# Patient Record
Sex: MALE | Race: WHITE | NOT HISPANIC OR LATINO | Employment: OTHER | URBAN - NONMETROPOLITAN AREA
[De-identification: names, ages, dates, MRNs, and addresses within clinical notes are randomized per-mention and may not be internally consistent; named-entity substitution may affect disease eponyms.]

---

## 2021-03-04 ENCOUNTER — OFFICE VISIT (OUTPATIENT)
Dept: UROLOGY | Facility: CLINIC | Age: 71
End: 2021-03-04

## 2021-03-04 VITALS — WEIGHT: 155 LBS | HEIGHT: 67 IN | TEMPERATURE: 97.8 F | BODY MASS INDEX: 24.33 KG/M2

## 2021-03-04 DIAGNOSIS — N13.1 HYDRONEPHROSIS DUE TO OBSTRUCTION OF URETERAL ORIFICE: ICD-10-CM

## 2021-03-04 DIAGNOSIS — D49.4 BLADDER TUMOR: Primary | ICD-10-CM

## 2021-03-04 PROBLEM — N13.30 HYDRONEPHROSIS: Status: ACTIVE | Noted: 2021-03-04

## 2021-03-04 LAB
BILIRUB BLD-MCNC: NEGATIVE MG/DL
CLARITY, POC: CLEAR
COLOR UR: YELLOW
GLUCOSE UR STRIP-MCNC: NEGATIVE MG/DL
KETONES UR QL: NEGATIVE
LEUKOCYTE EST, POC: NEGATIVE
NITRITE UR-MCNC: NEGATIVE MG/ML
PH UR: 7 [PH] (ref 5–8)
PROT UR STRIP-MCNC: NEGATIVE MG/DL
RBC # UR STRIP: ABNORMAL /UL
SP GR UR: 1.01 (ref 1–1.03)
UROBILINOGEN UR QL: NORMAL

## 2021-03-04 PROCEDURE — 99203 OFFICE O/P NEW LOW 30 MIN: CPT | Performed by: UROLOGY

## 2021-03-04 PROCEDURE — 81003 URINALYSIS AUTO W/O SCOPE: CPT | Performed by: UROLOGY

## 2021-03-04 RX ORDER — METOPROLOL TARTRATE 50 MG/1
50 TABLET, FILM COATED ORAL
COMMUNITY
Start: 2021-02-17

## 2021-03-04 RX ORDER — HYDROCHLOROTHIAZIDE 12.5 MG/1
12.5 TABLET ORAL
COMMUNITY
Start: 2021-02-22

## 2021-03-04 RX ORDER — GENTAMICIN SULFATE 80 MG/100ML
80 INJECTION, SOLUTION INTRAVENOUS ONCE
Status: CANCELLED | OUTPATIENT
Start: 2021-03-17 | End: 2021-03-04

## 2021-03-04 RX ORDER — AMLODIPINE BESYLATE 10 MG/1
10 TABLET ORAL
COMMUNITY
Start: 2021-02-17

## 2021-03-04 NOTE — PROGRESS NOTES
Chief Complaint:          Chief Complaint   Patient presents with   • Benign Prostatic Hypertrophy     New PAtient    • Nephrolithiasis       HPI:   70 y.o. male is referred for evaluation of an enlarged prostate.  He sees Dr. Alec Soto, in [place].  He brought a disc which I personally reviewed showing a large massive right-sided hydroureteronephrosis down to mass with some calcification in the distal ureter most consistent with tumor.  He needs a cystoscopy ureteroscopy he denies exposure to agent orange.  He denies a family history of prostate cancer.  He has had a prior hydrocele by myself many years ago on a repeat by Dr. Alec Soto.  He has gross blood that are vermiform in nature.  He has an atrophic left testicle and a persistent right-sided hydrocele.      Past Medical History:      History reviewed. No pertinent past medical history.      Current Meds:     Current Outpatient Medications   Medication Sig Dispense Refill   • amLODIPine (NORVASC) 10 MG tablet 10 mg.     • hydroCHLOROthiazide (HYDRODIURIL) 12.5 MG tablet 12.5 mg.     • metoprolol tartrate (LOPRESSOR) 50 MG tablet 50 mg.       No current facility-administered medications for this visit.         Allergies:      Allergies   Allergen Reactions   • Penicillins Anaphylaxis        Past Surgical History:     History reviewed. No pertinent surgical history.      Social History:     Social History     Socioeconomic History   • Marital status: Unknown     Spouse name: Not on file   • Number of children: Not on file   • Years of education: Not on file   • Highest education level: Not on file       Family History:     History reviewed. No pertinent family history.    Review of Systems:     Review of Systems   Constitutional: Negative.    HENT: Negative.    Eyes: Negative.    Respiratory: Negative.    Cardiovascular: Negative.    Gastrointestinal: Negative.    Endocrine: Negative.    Genitourinary: Positive for flank pain and hematuria.    Allergic/Immunologic: Negative.    Neurological: Negative.    Hematological: Negative.    Psychiatric/Behavioral: Negative.        Physical Exam:     Physical Exam  Vitals signs and nursing note reviewed.   Constitutional:       Appearance: He is well-developed.   HENT:      Head: Normocephalic and atraumatic.   Eyes:      Conjunctiva/sclera: Conjunctivae normal.      Pupils: Pupils are equal, round, and reactive to light.   Neck:      Musculoskeletal: Normal range of motion.   Cardiovascular:      Rate and Rhythm: Normal rate and regular rhythm.      Heart sounds: Normal heart sounds.   Pulmonary:      Effort: Pulmonary effort is normal.      Breath sounds: Normal breath sounds.   Abdominal:      General: Bowel sounds are normal.      Palpations: Abdomen is soft.   Genitourinary:     Comments: Soft, nontender abdomen atrophic left testicle right-sided firm hydrocele  Musculoskeletal: Normal range of motion.   Skin:     General: Skin is warm and dry.   Neurological:      Mental Status: He is alert and oriented to person, place, and time.      Deep Tendon Reflexes: Reflexes are normal and symmetric.   Psychiatric:         Behavior: Behavior normal.         Thought Content: Thought content normal.         Judgment: Judgment normal.         I have reviewed the following portions of the patient's history: allergies, current medications, past family history, past medical history, past social history, past surgical history, problem list and ROS and confirm it's accurate.      Procedure:       Assessment/Plan:   Right hydroureteronephrosis secondary to a distal solid lesion.  With some calcification.  This is most consistent with carcinoma.  I am recommending an urgent ureteroscopy.  Patient indicated that he may not want to proceed with diagnostic or therapeutic treatment and I told him as long as he is comfortable understanding that this is most likely malignancy.            Patient's Body mass index is 24.28 kg/m².  BMI is within normal parameters. No follow-up required..              This document has been electronically signed by HAROON GONG MD March 4, 2021 09:31 EST

## 2021-03-04 NOTE — H&P (VIEW-ONLY)
Chief Complaint:          Chief Complaint   Patient presents with   • Benign Prostatic Hypertrophy     New PAtient    • Nephrolithiasis       HPI:   70 y.o. male is referred for evaluation of an enlarged prostate.  He sees Dr. Alec Soto, in [place].  He brought a disc which I personally reviewed showing a large massive right-sided hydroureteronephrosis down to mass with some calcification in the distal ureter most consistent with tumor.  He needs a cystoscopy ureteroscopy he denies exposure to agent orange.  He denies a family history of prostate cancer.  He has had a prior hydrocele by myself many years ago on a repeat by Dr. Alec Soto.  He has gross blood that are vermiform in nature.  He has an atrophic left testicle and a persistent right-sided hydrocele.      Past Medical History:      History reviewed. No pertinent past medical history.      Current Meds:     Current Outpatient Medications   Medication Sig Dispense Refill   • amLODIPine (NORVASC) 10 MG tablet 10 mg.     • hydroCHLOROthiazide (HYDRODIURIL) 12.5 MG tablet 12.5 mg.     • metoprolol tartrate (LOPRESSOR) 50 MG tablet 50 mg.       No current facility-administered medications for this visit.         Allergies:      Allergies   Allergen Reactions   • Penicillins Anaphylaxis        Past Surgical History:     History reviewed. No pertinent surgical history.      Social History:     Social History     Socioeconomic History   • Marital status: Unknown     Spouse name: Not on file   • Number of children: Not on file   • Years of education: Not on file   • Highest education level: Not on file       Family History:     History reviewed. No pertinent family history.    Review of Systems:     Review of Systems   Constitutional: Negative.    HENT: Negative.    Eyes: Negative.    Respiratory: Negative.    Cardiovascular: Negative.    Gastrointestinal: Negative.    Endocrine: Negative.    Genitourinary: Positive for flank pain and hematuria.      Allergic/Immunologic: Negative.    Neurological: Negative.    Hematological: Negative.    Psychiatric/Behavioral: Negative.        Physical Exam:     Physical Exam  Vitals signs and nursing note reviewed.   Constitutional:       Appearance: He is well-developed.   HENT:      Head: Normocephalic and atraumatic.   Eyes:      Conjunctiva/sclera: Conjunctivae normal.      Pupils: Pupils are equal, round, and reactive to light.   Neck:      Musculoskeletal: Normal range of motion.   Cardiovascular:      Rate and Rhythm: Normal rate and regular rhythm.      Heart sounds: Normal heart sounds.   Pulmonary:      Effort: Pulmonary effort is normal.      Breath sounds: Normal breath sounds.   Abdominal:      General: Bowel sounds are normal.      Palpations: Abdomen is soft.   Genitourinary:     Comments: Soft, nontender abdomen atrophic left testicle right-sided firm hydrocele  Musculoskeletal: Normal range of motion.   Skin:     General: Skin is warm and dry.   Neurological:      Mental Status: He is alert and oriented to person, place, and time.      Deep Tendon Reflexes: Reflexes are normal and symmetric.   Psychiatric:         Behavior: Behavior normal.         Thought Content: Thought content normal.         Judgment: Judgment normal.         I have reviewed the following portions of the patient's history: allergies, current medications, past family history, past medical history, past social history, past surgical history, problem list and ROS and confirm it's accurate.      Procedure:       Assessment/Plan:   Right hydroureteronephrosis secondary to a distal solid lesion.  With some calcification.  This is most consistent with carcinoma.  I am recommending an urgent ureteroscopy.  Patient indicated that he may not want to proceed with diagnostic or therapeutic treatment and I told him as long as he is comfortable understanding that this is most likely malignancy.            Patient's Body mass index is 24.28 kg/m².  BMI is within normal parameters. No follow-up required..              This document has been electronically signed by HAROON GONG MD March 4, 2021 09:31 EST

## 2021-03-12 NOTE — DISCHARGE INSTRUCTIONS

## 2021-03-15 ENCOUNTER — APPOINTMENT (OUTPATIENT)
Dept: PREADMISSION TESTING | Facility: HOSPITAL | Age: 71
End: 2021-03-15

## 2021-03-15 ENCOUNTER — LAB (OUTPATIENT)
Dept: LAB | Facility: HOSPITAL | Age: 71
End: 2021-03-15

## 2021-03-15 DIAGNOSIS — D49.4 BLADDER TUMOR: ICD-10-CM

## 2021-03-15 LAB
ANION GAP SERPL CALCULATED.3IONS-SCNC: 9.8 MMOL/L (ref 5–15)
BUN SERPL-MCNC: 17 MG/DL (ref 8–23)
BUN/CREAT SERPL: 11 (ref 7–25)
CALCIUM SPEC-SCNC: 9.7 MG/DL (ref 8.6–10.5)
CHLORIDE SERPL-SCNC: 102 MMOL/L (ref 98–107)
CO2 SERPL-SCNC: 28.2 MMOL/L (ref 22–29)
CREAT SERPL-MCNC: 1.54 MG/DL (ref 0.76–1.27)
DEPRECATED RDW RBC AUTO: 40.4 FL (ref 37–54)
ERYTHROCYTE [DISTWIDTH] IN BLOOD BY AUTOMATED COUNT: 12.7 % (ref 12.3–15.4)
GFR SERPL CREATININE-BSD FRML MDRD: 45 ML/MIN/1.73
GLUCOSE SERPL-MCNC: 109 MG/DL (ref 65–99)
HCT VFR BLD AUTO: 44.9 % (ref 37.5–51)
HGB BLD-MCNC: 16.1 G/DL (ref 13–17.7)
MCH RBC QN AUTO: 31.4 PG (ref 26.6–33)
MCHC RBC AUTO-ENTMCNC: 35.9 G/DL (ref 31.5–35.7)
MCV RBC AUTO: 87.5 FL (ref 79–97)
PLATELET # BLD AUTO: 236 10*3/MM3 (ref 140–450)
PMV BLD AUTO: 9.5 FL (ref 6–12)
POTASSIUM SERPL-SCNC: 3.3 MMOL/L (ref 3.5–5.2)
RBC # BLD AUTO: 5.13 10*6/MM3 (ref 4.14–5.8)
SODIUM SERPL-SCNC: 140 MMOL/L (ref 136–145)
WBC # BLD AUTO: 6.03 10*3/MM3 (ref 3.4–10.8)

## 2021-03-15 PROCEDURE — 36415 COLL VENOUS BLD VENIPUNCTURE: CPT

## 2021-03-15 PROCEDURE — 85027 COMPLETE CBC AUTOMATED: CPT

## 2021-03-15 PROCEDURE — C9803 HOPD COVID-19 SPEC COLLECT: HCPCS

## 2021-03-15 PROCEDURE — 80048 BASIC METABOLIC PNL TOTAL CA: CPT

## 2021-03-15 PROCEDURE — 93005 ELECTROCARDIOGRAM TRACING: CPT

## 2021-03-15 PROCEDURE — U0004 COV-19 TEST NON-CDC HGH THRU: HCPCS

## 2021-03-15 PROCEDURE — 93010 ELECTROCARDIOGRAM REPORT: CPT | Performed by: INTERNAL MEDICINE

## 2021-03-16 LAB — SARS-COV-2 RNA NOSE QL NAA+PROBE: NOT DETECTED

## 2021-03-17 ENCOUNTER — APPOINTMENT (OUTPATIENT)
Dept: GENERAL RADIOLOGY | Facility: HOSPITAL | Age: 71
End: 2021-03-17

## 2021-03-17 ENCOUNTER — HOSPITAL ENCOUNTER (OUTPATIENT)
Facility: HOSPITAL | Age: 71
Discharge: HOME | End: 2021-03-17
Attending: UROLOGY | Admitting: UROLOGY

## 2021-03-17 ENCOUNTER — ANESTHESIA (OUTPATIENT)
Dept: PERIOP | Facility: HOSPITAL | Age: 71
End: 2021-03-17

## 2021-03-17 ENCOUNTER — ANESTHESIA EVENT (OUTPATIENT)
Dept: PERIOP | Facility: HOSPITAL | Age: 71
End: 2021-03-17

## 2021-03-17 VITALS
WEIGHT: 158 LBS | RESPIRATION RATE: 16 BRPM | HEIGHT: 67 IN | HEART RATE: 65 BPM | SYSTOLIC BLOOD PRESSURE: 138 MMHG | TEMPERATURE: 98.2 F | BODY MASS INDEX: 24.8 KG/M2 | OXYGEN SATURATION: 97 % | DIASTOLIC BLOOD PRESSURE: 82 MMHG

## 2021-03-17 DIAGNOSIS — N13.1 HYDRONEPHROSIS DUE TO OBSTRUCTION OF URETERAL ORIFICE: ICD-10-CM

## 2021-03-17 PROCEDURE — 25010000002 IOPAMIDOL 61 % SOLUTION: Performed by: UROLOGY

## 2021-03-17 PROCEDURE — 25010000002 IOPAMIDOL 61 % SOLUTION

## 2021-03-17 PROCEDURE — 76000 FLUOROSCOPY <1 HR PHYS/QHP: CPT

## 2021-03-17 PROCEDURE — 52351 CYSTOURETERO & OR PYELOSCOPE: CPT | Performed by: UROLOGY

## 2021-03-17 PROCEDURE — 25010000002 ONDANSETRON PER 1 MG: Performed by: NURSE ANESTHETIST, CERTIFIED REGISTERED

## 2021-03-17 PROCEDURE — 52235 CYSTOSCOPY AND TREATMENT: CPT | Performed by: UROLOGY

## 2021-03-17 PROCEDURE — C1769 GUIDE WIRE: HCPCS | Performed by: UROLOGY

## 2021-03-17 PROCEDURE — 25010000002 GENTAMICIN PER 80 MG: Performed by: UROLOGY

## 2021-03-17 PROCEDURE — 63710000001 OPIUM-BELLADONNA 16.2-30 MG SUPPOSITORY

## 2021-03-17 PROCEDURE — 76000 FLUOROSCOPY <1 HR PHYS/QHP: CPT | Performed by: RADIOLOGY

## 2021-03-17 PROCEDURE — 25010000002 MIDAZOLAM PER 1 MG: Performed by: NURSE ANESTHETIST, CERTIFIED REGISTERED

## 2021-03-17 PROCEDURE — 25010000002 DEXAMETHASONE PER 1 MG: Performed by: NURSE ANESTHETIST, CERTIFIED REGISTERED

## 2021-03-17 PROCEDURE — C1758 CATHETER, URETERAL: HCPCS | Performed by: UROLOGY

## 2021-03-17 PROCEDURE — 63710000001 OPIUM-BELLADONNA 16.2-30 MG SUPPOSITORY: Performed by: UROLOGY

## 2021-03-17 PROCEDURE — A9270 NON-COVERED ITEM OR SERVICE: HCPCS | Performed by: UROLOGY

## 2021-03-17 PROCEDURE — 25010000002 PROPOFOL 10 MG/ML EMULSION: Performed by: NURSE ANESTHETIST, CERTIFIED REGISTERED

## 2021-03-17 PROCEDURE — 25010000002 FENTANYL CITRATE (PF) 100 MCG/2ML SOLUTION: Performed by: NURSE ANESTHETIST, CERTIFIED REGISTERED

## 2021-03-17 PROCEDURE — A9270 NON-COVERED ITEM OR SERVICE: HCPCS

## 2021-03-17 RX ORDER — MIDAZOLAM HYDROCHLORIDE 1 MG/ML
1 INJECTION INTRAMUSCULAR; INTRAVENOUS
Status: DISCONTINUED | OUTPATIENT
Start: 2021-03-17 | End: 2021-03-17 | Stop reason: HOSPADM

## 2021-03-17 RX ORDER — KETOROLAC TROMETHAMINE 30 MG/ML
15 INJECTION, SOLUTION INTRAMUSCULAR; INTRAVENOUS EVERY 6 HOURS PRN
Status: DISCONTINUED | OUTPATIENT
Start: 2021-03-17 | End: 2021-03-17 | Stop reason: HOSPADM

## 2021-03-17 RX ORDER — OXYCODONE HYDROCHLORIDE AND ACETAMINOPHEN 5; 325 MG/1; MG/1
1 TABLET ORAL ONCE AS NEEDED
Status: DISCONTINUED | OUTPATIENT
Start: 2021-03-17 | End: 2021-03-17 | Stop reason: HOSPADM

## 2021-03-17 RX ORDER — FENTANYL CITRATE 50 UG/ML
INJECTION, SOLUTION INTRAMUSCULAR; INTRAVENOUS AS NEEDED
Status: DISCONTINUED | OUTPATIENT
Start: 2021-03-17 | End: 2021-03-17 | Stop reason: SURG

## 2021-03-17 RX ORDER — SODIUM CHLORIDE, SODIUM LACTATE, POTASSIUM CHLORIDE, CALCIUM CHLORIDE 600; 310; 30; 20 MG/100ML; MG/100ML; MG/100ML; MG/100ML
125 INJECTION, SOLUTION INTRAVENOUS ONCE
Status: COMPLETED | OUTPATIENT
Start: 2021-03-17 | End: 2021-03-17

## 2021-03-17 RX ORDER — SODIUM CHLORIDE 0.9 % (FLUSH) 0.9 %
10 SYRINGE (ML) INJECTION AS NEEDED
Status: DISCONTINUED | OUTPATIENT
Start: 2021-03-17 | End: 2021-03-17 | Stop reason: HOSPADM

## 2021-03-17 RX ORDER — MAGNESIUM HYDROXIDE 1200 MG/15ML
LIQUID ORAL AS NEEDED
Status: DISCONTINUED | OUTPATIENT
Start: 2021-03-17 | End: 2021-03-17 | Stop reason: HOSPADM

## 2021-03-17 RX ORDER — FENTANYL CITRATE 50 UG/ML
50 INJECTION, SOLUTION INTRAMUSCULAR; INTRAVENOUS
Status: DISCONTINUED | OUTPATIENT
Start: 2021-03-17 | End: 2021-03-17 | Stop reason: HOSPADM

## 2021-03-17 RX ORDER — MIDAZOLAM HYDROCHLORIDE 1 MG/ML
0.5 INJECTION INTRAMUSCULAR; INTRAVENOUS
Status: DISCONTINUED | OUTPATIENT
Start: 2021-03-17 | End: 2021-03-17 | Stop reason: HOSPADM

## 2021-03-17 RX ORDER — MIDAZOLAM HYDROCHLORIDE 1 MG/ML
INJECTION INTRAMUSCULAR; INTRAVENOUS AS NEEDED
Status: DISCONTINUED | OUTPATIENT
Start: 2021-03-17 | End: 2021-03-17 | Stop reason: SURG

## 2021-03-17 RX ORDER — SODIUM CHLORIDE 0.9 % (FLUSH) 0.9 %
10 SYRINGE (ML) INJECTION EVERY 12 HOURS SCHEDULED
Status: DISCONTINUED | OUTPATIENT
Start: 2021-03-17 | End: 2021-03-17 | Stop reason: HOSPADM

## 2021-03-17 RX ORDER — IPRATROPIUM BROMIDE AND ALBUTEROL SULFATE 2.5; .5 MG/3ML; MG/3ML
3 SOLUTION RESPIRATORY (INHALATION) ONCE AS NEEDED
Status: DISCONTINUED | OUTPATIENT
Start: 2021-03-17 | End: 2021-03-17 | Stop reason: HOSPADM

## 2021-03-17 RX ORDER — ATROPA BELLADONNA AND OPIUM 16.2; 3 MG/1; MG/1
SUPPOSITORY RECTAL AS NEEDED
Status: DISCONTINUED | OUTPATIENT
Start: 2021-03-17 | End: 2021-03-17 | Stop reason: HOSPADM

## 2021-03-17 RX ORDER — DROPERIDOL 2.5 MG/ML
0.62 INJECTION, SOLUTION INTRAMUSCULAR; INTRAVENOUS ONCE AS NEEDED
Status: DISCONTINUED | OUTPATIENT
Start: 2021-03-17 | End: 2021-03-17 | Stop reason: HOSPADM

## 2021-03-17 RX ORDER — ONDANSETRON 2 MG/ML
4 INJECTION INTRAMUSCULAR; INTRAVENOUS AS NEEDED
Status: DISCONTINUED | OUTPATIENT
Start: 2021-03-17 | End: 2021-03-17 | Stop reason: HOSPADM

## 2021-03-17 RX ORDER — PROPOFOL 10 MG/ML
VIAL (ML) INTRAVENOUS AS NEEDED
Status: DISCONTINUED | OUTPATIENT
Start: 2021-03-17 | End: 2021-03-17 | Stop reason: SURG

## 2021-03-17 RX ORDER — HYDROCODONE BITARTRATE AND ACETAMINOPHEN 10; 325 MG/1; MG/1
1 TABLET ORAL EVERY 4 HOURS PRN
Qty: 12 TABLET | Refills: 0 | Status: SHIPPED | OUTPATIENT
Start: 2021-03-17 | End: 2022-08-15

## 2021-03-17 RX ORDER — ONDANSETRON 2 MG/ML
INJECTION INTRAMUSCULAR; INTRAVENOUS AS NEEDED
Status: DISCONTINUED | OUTPATIENT
Start: 2021-03-17 | End: 2021-03-17 | Stop reason: SURG

## 2021-03-17 RX ORDER — DEXAMETHASONE SODIUM PHOSPHATE 10 MG/ML
INJECTION INTRAMUSCULAR; INTRAVENOUS AS NEEDED
Status: DISCONTINUED | OUTPATIENT
Start: 2021-03-17 | End: 2021-03-17 | Stop reason: SURG

## 2021-03-17 RX ORDER — LIDOCAINE HYDROCHLORIDE 20 MG/ML
INJECTION, SOLUTION INFILTRATION; PERINEURAL AS NEEDED
Status: DISCONTINUED | OUTPATIENT
Start: 2021-03-17 | End: 2021-03-17 | Stop reason: SURG

## 2021-03-17 RX ORDER — SODIUM CHLORIDE, SODIUM LACTATE, POTASSIUM CHLORIDE, CALCIUM CHLORIDE 600; 310; 30; 20 MG/100ML; MG/100ML; MG/100ML; MG/100ML
100 INJECTION, SOLUTION INTRAVENOUS ONCE AS NEEDED
Status: DISCONTINUED | OUTPATIENT
Start: 2021-03-17 | End: 2021-03-17 | Stop reason: HOSPADM

## 2021-03-17 RX ORDER — GENTAMICIN SULFATE 80 MG/100ML
80 INJECTION, SOLUTION INTRAVENOUS ONCE
Status: COMPLETED | OUTPATIENT
Start: 2021-03-17 | End: 2021-03-17

## 2021-03-17 RX ADMIN — GENTAMICIN SULFATE 80 MG: 80 INJECTION, SOLUTION INTRAVENOUS at 09:51

## 2021-03-17 RX ADMIN — SODIUM CHLORIDE, POTASSIUM CHLORIDE, SODIUM LACTATE AND CALCIUM CHLORIDE 125 ML/HR: 600; 310; 30; 20 INJECTION, SOLUTION INTRAVENOUS at 09:12

## 2021-03-17 RX ADMIN — FENTANYL CITRATE 25 MCG: 50 INJECTION INTRAMUSCULAR; INTRAVENOUS at 10:23

## 2021-03-17 RX ADMIN — MIDAZOLAM 2 MG: 1 INJECTION INTRAMUSCULAR; INTRAVENOUS at 09:51

## 2021-03-17 RX ADMIN — ONDANSETRON 4 MG: 2 INJECTION INTRAMUSCULAR; INTRAVENOUS at 10:04

## 2021-03-17 RX ADMIN — LIDOCAINE HYDROCHLORIDE 100 MG: 20 INJECTION, SOLUTION INFILTRATION; PERINEURAL at 09:56

## 2021-03-17 RX ADMIN — DEXAMETHASONE SODIUM PHOSPHATE 4 MG: 10 INJECTION INTRAMUSCULAR; INTRAVENOUS at 10:04

## 2021-03-17 RX ADMIN — FENTANYL CITRATE 50 MCG: 50 INJECTION INTRAMUSCULAR; INTRAVENOUS at 10:01

## 2021-03-17 RX ADMIN — FENTANYL CITRATE 50 MCG: 50 INJECTION INTRAMUSCULAR; INTRAVENOUS at 10:08

## 2021-03-17 RX ADMIN — SODIUM CHLORIDE, POTASSIUM CHLORIDE, SODIUM LACTATE AND CALCIUM CHLORIDE: 600; 310; 30; 20 INJECTION, SOLUTION INTRAVENOUS at 09:51

## 2021-03-17 RX ADMIN — PROPOFOL 200 MG: 10 INJECTION, EMULSION INTRAVENOUS at 09:56

## 2021-03-17 RX ADMIN — FENTANYL CITRATE 25 MCG: 50 INJECTION INTRAMUSCULAR; INTRAVENOUS at 10:26

## 2021-03-17 NOTE — ANESTHESIA POSTPROCEDURE EVALUATION
Patient: Alycia De La Rosa    Procedure Summary     Date: 03/17/21 Room / Location: [facility] OR 06 /  [facility] OR    Anesthesia Start: 0951 Anesthesia Stop: 1030    Procedures:       CYSTOSCOPY TRANSURETHRAL RESECTION OF BLADDER TUMOR (Bilateral )      CYSTOSCOPY RETROGRADE PYELOGRAM (Right ) Diagnosis:       Hydronephrosis due to obstruction of ureteral orifice      (Hydronephrosis due to obstruction of ureteral orifice [N13.2])    Surgeons: Dennis Link MD Provider: Pastor Myers MD    Anesthesia Type: general ASA Status: 2          Anesthesia Type: general    Vitals  Vitals Value Taken Time   /75 03/17/21 1042   Temp 98.2 °F (36.8 °C) 03/17/21 1032   Pulse 53 03/17/21 1042   Resp 10 03/17/21 1042   SpO2 96 % 03/17/21 1042           Post Anesthesia Care and Evaluation    Patient location during evaluation: PHASE II  Patient participation: complete - patient participated  Level of consciousness: awake and alert  Pain score: 0  Pain management: adequate  Airway patency: patent  Anesthetic complications: No anesthetic complications    Cardiovascular status: acceptable  Respiratory status: acceptable  Hydration status: acceptable

## 2021-03-17 NOTE — ANESTHESIA PROCEDURE NOTES
Airway  Urgency: elective    Date/Time: 3/17/2021 9:56 AM  Airway not difficult    General Information and Staff    Patient location during procedure: OR  CRNA: Emma To CRNA    Indications and Patient Condition  Indications for airway management: airway protection    Preoxygenated: yes  MILS maintained throughout  Mask difficulty assessment: 0 - not attempted    Final Airway Details  Final airway type: supraglottic airway      Successful airway: classic  Size 4    Number of attempts at approach: 1  Assessment: lips, teeth, and gum same as pre-op

## 2021-03-17 NOTE — ANESTHESIA PREPROCEDURE EVALUATION
Anesthesia Evaluation     history of anesthetic complications: PONV  NPO Solid Status: > 8 hours  NPO Liquid Status: > 8 hours           Airway   Mallampati: II  TM distance: >3 FB  Neck ROM: full  No difficulty expected  Dental    (+) edentulous    Pulmonary - normal exam   Cardiovascular - normal exam    (+) hypertension,       Neuro/Psych  (+) TIA,     GI/Hepatic/Renal/Endo    (+)   renal disease stones,     Musculoskeletal     Abdominal  - normal exam   Substance History      OB/GYN          Other                      Anesthesia Plan    ASA 2     general     intravenous induction     Anesthetic plan, all risks, benefits, and alternatives have been provided, discussed and informed consent has been obtained with: patient.

## 2021-03-17 NOTE — OP NOTE
CYSTOSCOPY URETEROSCOPY, CYSTOSCOPY RETROGRADE PYELOGRAM  Procedure Note    Alycia De La Rosa  3/17/2021    Pre-op Diagnosis:   Hydronephrosis due to obstruction of ureteral orifice [N13.2]    Post-op Diagnosis:     Post-Op Diagnosis Codes:     * Hydronephrosis due to obstruction of ureteral orifice [N13.2]    Procedure/CPT® Codes:  70-year-old white male presents with a right nonfunctional kidney gross hematuria and some calcification at the area of the distal ureter.  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  Following an informed consent brought the operative suite careful cystoscopy was undertaken normal urethra normal prostate indented right ureteral orifice and an obvious tumor behind it.  With extensive neovascularity attempted retrograde impacted the needle orifice but it was completely obstructed TUR the area until obvious ureter and a large amount of tumor extruded sent off for pathologic confirmation hemostasis was excellent bladder was catheterized bimanual was negative    Procedure(s):  CYSTOSCOPY TRANSURETHRAL RESECTION OF BLADDER TUMOR-resected 2.5 cm  CYSTOSCOPY RETROGRADE PYELOGRAM    Surgeon(s):  Dennis Link MD    Anesthesia: see anesthesia record    Staff:   Circulator: Alaina Osman RN  Scrub Person: Indiana Oliveira LPN  Other: Maggie Chapin    Estimated Blood Loss: none  Urine Voided: * No values recorded between 3/17/2021  9:51 AM and 3/17/2021 10:31 AM *    Specimens:                ID Type Source Tests Collected by Time   A : Bladder/Right Ureteral Tumor Tissue Urinary Bladder TISSUE PATHOLOGY EXAM Dennis Link MD 3/17/2021 1009         Drains: Mccall catheter    Findings: 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

## 2021-03-18 LAB
LAB AP CASE REPORT: NORMAL
QT INTERVAL: 402 MS
QTC INTERVAL: 454 MS

## 2021-03-23 ENCOUNTER — OFFICE VISIT (OUTPATIENT)
Dept: UROLOGY | Facility: CLINIC | Age: 71
End: 2021-03-23

## 2021-03-23 VITALS — WEIGHT: 157.41 LBS | BODY MASS INDEX: 24.71 KG/M2 | TEMPERATURE: 98.2 F | HEIGHT: 67 IN

## 2021-03-23 DIAGNOSIS — C66.1 URETERAL CARCINOMA, RIGHT (HCC): Primary | ICD-10-CM

## 2021-03-23 PROCEDURE — 99213 OFFICE O/P EST LOW 20 MIN: CPT | Performed by: UROLOGY

## 2021-03-23 NOTE — PROGRESS NOTES
Chief Complaint:          Chief Complaint   Patient presents with   • Post-op       HPI:   70 y.o. male returns today accompanied by his daughter who is a nurse at nephrology.  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  But he is really 70 and quite healthy he has no constitutional symptoms I will order staging work-up and I will follow up with him based on this      Past Medical History:        Past Medical History:   Diagnosis Date   • Arthritis    • Asthma    • GERD (gastroesophageal reflux disease)    • Hydronephrosis 3/4/2021   • Hypertension    • Kidney stone    • TIA (transient ischemic attack)     AGE 29         Current Meds:     Current Outpatient Medications   Medication Sig Dispense Refill   • amLODIPine (NORVASC) 10 MG tablet 10 mg.     • hydroCHLOROthiazide (HYDRODIURIL) 12.5 MG tablet 12.5 mg.     • HYDROcodone-acetaminophen (NORCO)  MG per tablet Take 1 tablet by mouth Every 4 (Four) Hours As Needed for Moderate Pain 12 tablet 0   • metoprolol tartrate (LOPRESSOR) 50 MG tablet 50 mg.       No current facility-administered medications for this visit.        Allergies:      Allergies   Allergen Reactions   • Lisinopril Anaphylaxis   • Penicillins Anaphylaxis        Past Surgical History:     Past Surgical History:   Procedure Laterality Date   • CYSTOSCOPY RETROGRADE PYELOGRAM Right 3/17/2021    Procedure: CYSTOSCOPY RETROGRADE PYELOGRAM;  Surgeon: Dennis Link MD;  Location: [facility];  Service: Urology;  Laterality: Right;   • CYSTOSCOPY URETEROSCOPY  Bilateral 3/17/2021    Procedure: CYSTOSCOPY TRANSURETHRAL RESECTION OF BLADDER TUMOR;  Surgeon: Dennis Link MD;  Location: [facility];  Service: Urology;  Laterality: Bilateral;   • HYDROCELE EXCISION / REPAIR           Social History:     Social History     Socioeconomic History   • Marital status: Unknown     Spouse name: Not on file   • Number of children: Not on file   • Years of education: Not on file   • Highest education level: Not on file   Tobacco Use   • Smoking status: Former Smoker     Packs/day: 3.00     Years: 5.00     Pack years: 15.00     Types: Cigarettes     Quit date:      Years since quittin.2   • Smokeless tobacco: Former User   Vaping Use   • Vaping Use: Never used   Substance and Sexual Activity   • Alcohol use: Not Currently   • Drug use: Never   • Sexual activity: Defer       Family History:     Family History   Problem Relation Age of Onset   • No Known Problems Father    • No Known Problems Mother        Review of Systems:     Review of Systems   Constitutional: Negative.    HENT: Negative.    Eyes: Negative.    Respiratory: Negative.    Cardiovascular: Negative.    Gastrointestinal: Negative.    Endocrine: Negative.    Musculoskeletal: Negative.    Allergic/Immunologic: Negative.    Neurological: Negative.    Hematological: Negative.    Psychiatric/Behavioral: Negative.        Physical Exam:     Physical Exam  Vitals and nursing note reviewed.   Constitutional:       Appearance: He is well-developed.   HENT:      Head: Normocephalic and atraumatic.   Eyes:      Conjunctiva/sclera: Conjunctivae normal.      Pupils: Pupils are equal, round, and reactive to light.   Cardiovascular:      Rate and Rhythm: Normal rate and regular rhythm.      Heart sounds: Normal heart sounds.   Pulmonary:      Effort: Pulmonary effort is normal.      Breath sounds: Normal breath sounds.   Abdominal:      General: Bowel sounds are normal.      Palpations: Abdomen is soft.   Musculoskeletal:          General: Normal range of motion.      Cervical back: Normal range of motion.   Skin:     General: Skin is warm and dry.   Neurological:      Mental Status: He is alert and oriented to person, place, and time.      Deep Tendon Reflexes: Reflexes are normal and symmetric.   Psychiatric:         Behavior: Behavior normal.         Thought Content: Thought content normal.         Judgment: Judgment normal.         I have reviewed the following portions of the patient's history: allergies, current medications, past family history, past medical history, past social history, past surgical history, problem list and ROS and confirm it's accurate.      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 Nik Sylvester.            Patient's Body mass index is 24.71 kg/m². BMI is within normal parameters. No follow-up required..              This document has been electronically signed by HAROON GONG MD March 23, 2021 07:59 EDT

## 2021-03-25 PROBLEM — C66.1: Status: ACTIVE | Noted: 2021-03-25

## 2021-04-12 ENCOUNTER — HOSPITAL ENCOUNTER (OUTPATIENT)
Dept: CT IMAGING | Facility: HOSPITAL | Age: 71
Discharge: HOME | End: 2021-04-12
Admitting: UROLOGY

## 2021-04-12 ENCOUNTER — APPOINTMENT (OUTPATIENT)
Dept: CT IMAGING | Facility: HOSPITAL | Age: 71
End: 2021-04-12

## 2021-04-12 DIAGNOSIS — C66.1 URETERAL CARCINOMA, RIGHT (HCC): ICD-10-CM

## 2021-04-12 PROCEDURE — 25010000002 IOPAMIDOL 61 % SOLUTION: Performed by: UROLOGY

## 2021-04-12 PROCEDURE — 71260 CT THORAX DX C+: CPT | Performed by: RADIOLOGY

## 2021-04-12 PROCEDURE — 71260 CT THORAX DX C+: CPT

## 2021-04-12 RX ADMIN — IOPAMIDOL 80 ML: 612 INJECTION, SOLUTION INTRAVENOUS at 14:11

## 2021-04-22 ENCOUNTER — HOSPITAL ENCOUNTER (OUTPATIENT)
Dept: CT IMAGING | Facility: HOSPITAL | Age: 71
Discharge: HOME | End: 2021-04-22
Admitting: UROLOGY

## 2021-04-22 DIAGNOSIS — C66.1 URETERAL CARCINOMA, RIGHT (HCC): ICD-10-CM

## 2021-04-22 LAB — CREAT BLDA-MCNC: 1.7 MG/DL (ref 0.6–1.3)

## 2021-04-22 PROCEDURE — 74176 CT ABD & PELVIS W/O CONTRAST: CPT

## 2021-04-22 PROCEDURE — 82565 ASSAY OF CREATININE: CPT

## 2021-04-22 PROCEDURE — 74176 CT ABD & PELVIS W/O CONTRAST: CPT | Performed by: RADIOLOGY

## 2021-05-12 ENCOUNTER — TRANSCRIBE ORDERS (OUTPATIENT)
Dept: ADMINISTRATIVE | Facility: HOSPITAL | Age: 71
End: 2021-05-12

## 2021-05-12 DIAGNOSIS — C68.8 MALIGNANT NEOPLASM OF OVERLAPPING SITES OF URINARY ORGANS (HCC): Primary | ICD-10-CM

## 2021-05-12 DIAGNOSIS — C67.9 MALIGNANT NEOPLASM OF URINARY BLADDER, UNSPECIFIED SITE (HCC): ICD-10-CM

## 2021-05-19 ENCOUNTER — HOSPITAL ENCOUNTER (OUTPATIENT)
Dept: NUCLEAR MEDICINE | Facility: HOSPITAL | Age: 71
Discharge: HOME | End: 2021-05-19

## 2021-05-19 DIAGNOSIS — C68.8 MALIGNANT NEOPLASM OF OVERLAPPING SITES OF URINARY ORGANS (HCC): ICD-10-CM

## 2021-05-19 DIAGNOSIS — C67.9 MALIGNANT NEOPLASM OF URINARY BLADDER, UNSPECIFIED SITE (HCC): ICD-10-CM

## 2021-05-19 PROCEDURE — 25010000002 FUROSEMIDE PER 20 MG: Performed by: UROLOGY

## 2021-05-19 PROCEDURE — 78708 K FLOW/FUNCT IMAGE W/DRUG: CPT

## 2021-05-19 PROCEDURE — 0 TECHNETIUM MERTIATIDE: Performed by: UROLOGY

## 2021-05-19 PROCEDURE — 78708 K FLOW/FUNCT IMAGE W/DRUG: CPT | Performed by: RADIOLOGY

## 2021-05-19 PROCEDURE — A9562 TC99M MERTIATIDE: HCPCS | Performed by: UROLOGY

## 2021-05-19 RX ORDER — FUROSEMIDE 10 MG/ML
20 INJECTION INTRAMUSCULAR; INTRAVENOUS
Status: COMPLETED | OUTPATIENT
Start: 2021-05-19 | End: 2021-05-19

## 2021-05-19 RX ADMIN — TECHNESCAN TC 99M MERTIATIDE 1 DOSE: 1 INJECTION, POWDER, LYOPHILIZED, FOR SOLUTION INTRAVENOUS at 08:49

## 2021-05-19 RX ADMIN — FUROSEMIDE 20 MG: 10 INJECTION, SOLUTION INTRAVENOUS at 08:59

## 2021-06-04 ENCOUNTER — TRANSCRIBE ORDERS (OUTPATIENT)
Dept: ADMINISTRATIVE | Facility: HOSPITAL | Age: 71
End: 2021-06-04

## 2021-06-04 DIAGNOSIS — Z01.818 OTHER SPECIFIED PRE-OPERATIVE EXAMINATION: Primary | ICD-10-CM

## 2021-06-09 ENCOUNTER — LAB (OUTPATIENT)
Dept: LAB | Facility: HOSPITAL | Age: 71
End: 2021-06-09

## 2021-06-09 DIAGNOSIS — Z01.818 OTHER SPECIFIED PRE-OPERATIVE EXAMINATION: ICD-10-CM

## 2021-06-09 LAB — SARS-COV-2 RNA RESP QL NAA+PROBE: NOT DETECTED

## 2021-06-09 PROCEDURE — U0003 INFECTIOUS AGENT DETECTION BY NUCLEIC ACID (DNA OR RNA); SEVERE ACUTE RESPIRATORY SYNDROME CORONAVIRUS 2 (SARS-COV-2) (CORONAVIRUS DISEASE [COVID-19]), AMPLIFIED PROBE TECHNIQUE, MAKING USE OF HIGH THROUGHPUT TECHNOLOGIES AS DESCRIBED BY CMS-2020-01-R: HCPCS

## 2021-06-09 PROCEDURE — C9803 HOPD COVID-19 SPEC COLLECT: HCPCS

## 2021-07-02 ENCOUNTER — TRANSCRIBE ORDERS (OUTPATIENT)
Dept: ADMINISTRATIVE | Facility: HOSPITAL | Age: 71
End: 2021-07-02

## 2021-07-02 DIAGNOSIS — C68.9 UROTHELIAL CARCINOMA (HCC): Primary | ICD-10-CM

## 2021-07-09 ENCOUNTER — APPOINTMENT (OUTPATIENT)
Dept: CT IMAGING | Facility: HOSPITAL | Age: 71
End: 2021-07-09

## 2021-07-16 ENCOUNTER — TELEPHONE (OUTPATIENT)
Dept: ONCOLOGY | Facility: CLINIC | Age: 71
End: 2021-07-16

## 2021-07-16 NOTE — TELEPHONE ENCOUNTER
Provider: DR KAPOOR  Caller: ZHANNA  Relationship to Patient: DAUGHTER  Phone Number: [phone]  Reason for Call: PATIENT'S DAUGHTER WOULD LIKE HIM TO SEE DR FAIRBANKS INSTEAD OF DR KAPOOR BECAUSE SHE WAS RECOMMENDED. THEY'D ALSO LIKE THE EARLIEST APPT POSSIBLE. PLEASE CALL BACK TO DISCUSS.

## 2021-07-19 ENCOUNTER — CONSULT (OUTPATIENT)
Dept: ONCOLOGY | Facility: CLINIC | Age: 71
End: 2021-07-19

## 2021-07-19 VITALS
OXYGEN SATURATION: 99 % | RESPIRATION RATE: 18 BRPM | TEMPERATURE: 97.8 F | BODY MASS INDEX: 24.83 KG/M2 | HEIGHT: 67 IN | DIASTOLIC BLOOD PRESSURE: 81 MMHG | HEART RATE: 73 BPM | SYSTOLIC BLOOD PRESSURE: 137 MMHG | WEIGHT: 158.2 LBS

## 2021-07-19 DIAGNOSIS — N13.9 OBSTRUCTIVE UROPATHY: ICD-10-CM

## 2021-07-19 DIAGNOSIS — C67.9 SMALL CELL CARCINOMA OF BLADDER (HCC): ICD-10-CM

## 2021-07-19 DIAGNOSIS — R53.83 FATIGUE, UNSPECIFIED TYPE: ICD-10-CM

## 2021-07-19 DIAGNOSIS — C66.1 URETERAL CARCINOMA, RIGHT (HCC): Primary | ICD-10-CM

## 2021-07-19 DIAGNOSIS — G89.3 NEOPLASM RELATED PAIN: ICD-10-CM

## 2021-07-19 DIAGNOSIS — R31.9 HEMATURIA, UNSPECIFIED TYPE: ICD-10-CM

## 2021-07-19 LAB
ALBUMIN SERPL-MCNC: 4.7 G/DL (ref 3.5–5.2)
ALBUMIN/GLOB SERPL: 1.4 G/DL
ALP SERPL-CCNC: 63 U/L (ref 39–117)
ALT SERPL W P-5'-P-CCNC: 14 U/L (ref 1–41)
ANION GAP SERPL CALCULATED.3IONS-SCNC: 12.2 MMOL/L (ref 5–15)
AST SERPL-CCNC: 24 U/L (ref 1–40)
BASOPHILS # BLD AUTO: 0.09 10*3/MM3 (ref 0–0.2)
BASOPHILS NFR BLD AUTO: 1.2 % (ref 0–1.5)
BILIRUB SERPL-MCNC: 0.5 MG/DL (ref 0–1.2)
BUN SERPL-MCNC: 15 MG/DL (ref 8–23)
BUN/CREAT SERPL: 9.4 (ref 7–25)
CALCIUM SPEC-SCNC: 9.8 MG/DL (ref 8.6–10.5)
CHLORIDE SERPL-SCNC: 99 MMOL/L (ref 98–107)
CO2 SERPL-SCNC: 25.8 MMOL/L (ref 22–29)
CREAT SERPL-MCNC: 1.59 MG/DL (ref 0.76–1.27)
DEPRECATED RDW RBC AUTO: 38.5 FL (ref 37–54)
EOSINOPHIL # BLD AUTO: 0.39 10*3/MM3 (ref 0–0.4)
EOSINOPHIL NFR BLD AUTO: 5.2 % (ref 0.3–6.2)
ERYTHROCYTE [DISTWIDTH] IN BLOOD BY AUTOMATED COUNT: 12.3 % (ref 12.3–15.4)
FERRITIN SERPL-MCNC: 823.4 NG/ML (ref 30–400)
GFR SERPL CREATININE-BSD FRML MDRD: 43 ML/MIN/1.73
GLOBULIN UR ELPH-MCNC: 3.3 GM/DL
GLUCOSE SERPL-MCNC: 122 MG/DL (ref 65–99)
HCT VFR BLD AUTO: 45.6 % (ref 37.5–51)
HGB BLD-MCNC: 16.4 G/DL (ref 13–17.7)
IMM GRANULOCYTES # BLD AUTO: 0.01 10*3/MM3 (ref 0–0.05)
IMM GRANULOCYTES NFR BLD AUTO: 0.1 % (ref 0–0.5)
IRON 24H UR-MRATE: 79 MCG/DL (ref 59–158)
IRON SATN MFR SERPL: 26 % (ref 20–50)
LDH SERPL-CCNC: 371 U/L (ref 135–225)
LYMPHOCYTES # BLD AUTO: 1.79 10*3/MM3 (ref 0.7–3.1)
LYMPHOCYTES NFR BLD AUTO: 23.9 % (ref 19.6–45.3)
MCH RBC QN AUTO: 30.9 PG (ref 26.6–33)
MCHC RBC AUTO-ENTMCNC: 36 G/DL (ref 31.5–35.7)
MCV RBC AUTO: 86 FL (ref 79–97)
MONOCYTES # BLD AUTO: 0.61 10*3/MM3 (ref 0.1–0.9)
MONOCYTES NFR BLD AUTO: 8.2 % (ref 5–12)
NEUTROPHILS NFR BLD AUTO: 4.59 10*3/MM3 (ref 1.7–7)
NEUTROPHILS NFR BLD AUTO: 61.4 % (ref 42.7–76)
NRBC BLD AUTO-RTO: 0 /100 WBC (ref 0–0.2)
PLATELET # BLD AUTO: 260 10*3/MM3 (ref 140–450)
PMV BLD AUTO: 9.3 FL (ref 6–12)
POTASSIUM SERPL-SCNC: 3.5 MMOL/L (ref 3.5–5.2)
PROT SERPL-MCNC: 8 G/DL (ref 6–8.5)
RBC # BLD AUTO: 5.3 10*6/MM3 (ref 4.14–5.8)
SODIUM SERPL-SCNC: 137 MMOL/L (ref 136–145)
TIBC SERPL-MCNC: 305 MCG/DL (ref 298–536)
TRANSFERRIN SERPL-MCNC: 205 MG/DL (ref 200–360)
URATE SERPL-MCNC: 8 MG/DL (ref 3.4–7)
WBC # BLD AUTO: 7.48 10*3/MM3 (ref 3.4–10.8)

## 2021-07-19 PROCEDURE — 85025 COMPLETE CBC W/AUTO DIFF WBC: CPT | Performed by: INTERNAL MEDICINE

## 2021-07-19 PROCEDURE — 82607 VITAMIN B-12: CPT | Performed by: INTERNAL MEDICINE

## 2021-07-19 PROCEDURE — 83540 ASSAY OF IRON: CPT | Performed by: INTERNAL MEDICINE

## 2021-07-19 PROCEDURE — 83615 LACTATE (LD) (LDH) ENZYME: CPT | Performed by: INTERNAL MEDICINE

## 2021-07-19 PROCEDURE — 82746 ASSAY OF FOLIC ACID SERUM: CPT | Performed by: INTERNAL MEDICINE

## 2021-07-19 PROCEDURE — 84466 ASSAY OF TRANSFERRIN: CPT | Performed by: INTERNAL MEDICINE

## 2021-07-19 PROCEDURE — 82728 ASSAY OF FERRITIN: CPT | Performed by: INTERNAL MEDICINE

## 2021-07-19 PROCEDURE — 99205 OFFICE O/P NEW HI 60 MIN: CPT | Performed by: INTERNAL MEDICINE

## 2021-07-19 PROCEDURE — 84550 ASSAY OF BLOOD/URIC ACID: CPT | Performed by: INTERNAL MEDICINE

## 2021-07-19 PROCEDURE — 80053 COMPREHEN METABOLIC PANEL: CPT | Performed by: INTERNAL MEDICINE

## 2021-07-19 RX ORDER — OXYCODONE HYDROCHLORIDE 5 MG/1
TABLET ORAL
Qty: 100 TABLET | Refills: 0 | Status: SHIPPED | OUTPATIENT
Start: 2021-07-19 | End: 2021-08-10 | Stop reason: SDUPTHER

## 2021-07-19 RX ORDER — AMOXICILLIN 250 MG
2 CAPSULE ORAL 2 TIMES DAILY
Qty: 120 TABLET | Refills: 5 | Status: SHIPPED | OUTPATIENT
Start: 2021-07-19

## 2021-07-19 NOTE — PROGRESS NOTES
NAME: Alycia De La Rosa    : 1950    DATE OF CONSULTATION:  2021    REASON FOR REFERRAL: Small cell bladder cancer    REFERRING PHYSICIAN:  Xavier Arnold MD PhD    CHIEF COMPLAINT:  Small Cell Bladder cancer      HISTORY OF PRESENT ILLNESS:   Alycia De La Rosa is a very pleasant 71 y.o. male who is being seen today at the request of Dr. Xavier Arnold for evaluation and treatment of small cell bladder cancer. He first developed gross hematuria in 2021 and was seen by his PCP Dr. Alec Soto.  He had imaging revealing hydronephrosis due to obstruction of the R ureteral orifice with non-functioning R kidney and was referred to Dr. Link who performed cystoscopy with biopsy on 3-17-21.  Cystoscopy revealed a normal urethra nd prostate with indented R ureteral orfice with obvious tumor behind it.  He performed transurethral resection until obvious ureter and sent tumor for pathologic examination.  This showed high grade urothelial carcinoma .  Muscularis propria was present but uninvolved.  Dr. Link referred him to Dr. Sylvester for consideration of R nephrectomy / ureterectomy.  Mr. De La Rosa was referred to Dr. Sylvester who performed repeat cystoscopy 21.  This revealed a large mass pushing into the bladder mucosa / external compression with mass in the R saad-trigone and R bladder wall.  R ureteral orifice could not be identified.  A 10x8 cm mass was resected.  PAthology showed small cell carcinoma with invasion of the muscularis propria as well as focal conventional high grade urothelial carcinoma.  Dr. Sylvester referred him to Dr. Xavier Arnold given small cell histology.  Dr. Lott recommended imaging for staging and treatment with Carbo/Etoposide/Atezolizumab. He was referred here because he preferred to receive treatment close to home.    Mr. De La Rosa presents today with his daughter (who works with Dr. Otoole).  He is overall doing well.  He has had 2 episodes of gross hematuria in total, but this hasn't been a  persistent problem.  He denies weight loss.  No chest pain or shortness of breath. He complains of RLQ pelvic pain which radiates into his R hip/ R upper thigh.  He has been using Tylenol for pain which he says is sufficient, but he is clearly in pain in the office today and is unable to sit in the chair because of pain.     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;  Service: Urology;  Laterality: Right;   • CYSTOSCOPY URETEROSCOPY Bilateral 3/17/2021    Procedure: CYSTOSCOPY TRANSURETHRAL RESECTION OF BLADDER TUMOR;  Surgeon: Dennis Link MD;  Location: [facility];  Service: Urology;  Laterality: Bilateral;   • HYDROCELE EXCISION / REPAIR      x2       FAMILY HISTORY:  Family History   Problem Relation Age of Onset   • Cancer Father 84        prostate   • Cancer Mother 73        mouth   • Cancer Maternal Aunt         mouth   • Cancer Maternal Grandfather          SOCIAL HISTORY:  Social History     Socioeconomic History   • Marital status:      Spouse name: Not on file   • Number of children: Not on file   • Years of education: Not on file   • Highest education level: Not on file   Tobacco Use   • Smoking status: Former Smoker     Packs/day: 3.00     Years: 5.00     Pack years: 15.00     Types: Cigarettes     Quit date:      Years since quittin.5   • Smokeless tobacco: Former User   Vaping Use   • Vaping Use: Never used   Substance and Sexual Activity   • Alcohol use: Not Currently    • Drug use: Never   • Sexual activity: Defer     Social History     Social History Narrative    He used to work as a  and denies any unusual chemical exposures.        REVIEW OF SYSTEMS:   A comprehensive 14 point review of systems was performed.  Significant findings as mentioned above.  All other systems reviewed and are negative.      MEDICATIONS:  The current medication list was reviewed in the EMR    Current Outpatient Medications:   •  amLODIPine (NORVASC) 10 MG tablet, 10 mg., Disp: , Rfl:   •  hydroCHLOROthiazide (HYDRODIURIL) 12.5 MG tablet, 12.5 mg., Disp: , Rfl:   •  metoprolol tartrate (LOPRESSOR) 50 MG tablet, 50 mg., Disp: , Rfl:   •  HYDROcodone-acetaminophen (NORCO)  MG per tablet, Take 1 tablet by mouth Every 4 (Four) Hours As Needed for Moderate Pain, Disp: 12 tablet, Rfl: 0    ALLERGIES:    Allergies   Allergen Reactions   • Lisinopril Anaphylaxis   • Penicillins Anaphylaxis       PHYSICAL EXAM:  Vitals:    07/19/21 1412   BP: 137/81   Pulse: 73   Resp: 18   Temp: 97.8 °F (36.6 °C)   SpO2: 99%     Pain Score    07/19/21 1412   PainSc:   8     PHQ-9 Depression Screening  Little interest or pleasure in doing things? 0   Feeling down, depressed, or hopeless? 0   Trouble falling or staying asleep, or sleeping too much? 3   Feeling tired or having little energy? 0   Poor appetite or overeating? 0   Feeling bad about yourself - or that you are a failure or have let yourself or your family down? 0   Trouble concentrating on things, such as reading the newspaper or watching television? 0   Moving or speaking so slowly that other people could have noticed? Or the opposite - being so fidgety or restless that you have been moving around a lot more than usual? 0   Thoughts that you would be better off dead, or of hurting yourself in some way? 0   PHQ-9 Total Score 3   If you checked off any problems, how difficult have these problems made it for you to do your work, take care of things at  home, or get along with other people? Not difficult at all     PHQ-9 Total Score: 3    General:  Awake, alert and oriented, in no distress  HEENT:  Pupils are equal, round and reactive to light and accommodation, Extra-ocular movements full, Oropharyx clear, mucous membranes moist  Neck:  No JVD, thyromegaly or lymphadenopathy  CV:  Regular rate and rhythm, no murmurs, rubs or gallops  Resp:  Lungs are clear to auscultation bilaterally  Abd:  Soft, non-tender, non-distended, bowel sounds present, no organomegaly or masses  Ext:  No clubbing, cyanosis or edema  Lymph:  No cervical, supraclavicular, axillary, inguinal or femoral adenopathy  Neuro:  MS as above, CN II-XII intact, grossly non-focal exam      PATHOLOGY:  03-17-21 04-20-21 06-14-21          ENDOSCOPY:  Cystoscopy 03-17-21 ([facility]):  Findings: Probable right distal ureteral tumor with nonfunctioning kidney and massive dilation      Cystourethroscopy, TURP 06-14-21 ([facility])  Findings:   1.Left ureteral orifice identified. Retrograde pyelography without hydronephrosis or evidence of filling defect.   2. Large mass pushing into bladder mucosa/external compression- no obvious urothelial papillar lesion. Mass located in the right saad-trigone and right bladder wall. Inability to identify right ureteral orifice  3. Resection of mass with resection size of 10cm x 8 cm.   4. Hemostasis achieved at conclusion of case  5. Placement of 20 F 2 way catheter         IMAGING:  CT Chest 04-12-21  FINDINGS:    LUNGS:  Unremarkable.  No mass.  No consolidation.    PLEURAL SPACE:  Unremarkable.  No pneumothorax.  No significant  effusion.    HEART:  Unremarkable.  No cardiomegaly.  No significant pericardial  effusion.    MEDIASTINUM:  Moderate-sized hiatal hernia.    BONES/JOINTS:  Unremarkable.  No acute fracture.  No dislocation.    SOFT TISSUES:  Unremarkable.    VASCULATURE:  Unremarkable.  No thoracic aortic aneurysm.    LYMPH NODES:  Unremarkable.  No  enlarged lymph nodes.    LIVER:  Left lobe of liver cyst measuring 3 cm.    KIDNEYS AND URETERS:  Incidentally imaged right upper kidney shows  moderate hydronephrosis and cortical thinning.     IMPRESSION:    Moderate-sized hiatal hernia.        RECENT LABS:  Lab Results   Component Value Date    WBC 6.03 03/15/2021    HGB 16.1 03/15/2021    HCT 44.9 03/15/2021    MCV 87.5 03/15/2021    RDW 12.7 03/15/2021     03/15/2021       Lab Results   Component Value Date     03/15/2021    K 3.3 (L) 03/15/2021    CO2 28.2 03/15/2021     03/15/2021    BUN 17 03/15/2021    CREATININE 1.70 (H) 04/22/2021    EGFRIFNONA 45 (L) 03/15/2021    GLUCOSE 109 (H) 03/15/2021    CALCIUM 9.7 03/15/2021       No results found for: LDH, URICACID      ASSESSMENT & PLAN:  Alycia De La Rosa is a very pleasant 71 y.o. 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    1.  Small Cell Carcinoma of the Bladder:  -  Will need staging.    -  Clinically has [id number] disease with involvement of the R pelvic sidewall.    -  If limited stage disease, would favor treatment with Carboplatin/Etoposide followed by either cystectomy or radiation.  If he has metastatic disease, would agree with chemoimmunotherapy with Carboplatin/Etoposide with Atezolizumab.  Will plan to use Carboplatin over Cisplatin given solitary functional kidney.  -  Will get PET-CT and MRI brain for staging purposes.  -  Will refer to Dr. Cool for PAC placement in anticipation of chemotherapy.  - Will get bloodwork today to include CBCD, CMP, LDH, Uric acid.    2.  Neoplasm related pain:  -  He has been using Tylenol for pain but pain is uncontrolled.  -  Will give Oxycodone 5 mg 1-3 tabs q 4-6h prn pain.  He will keep a log.    -  Warned about constipation and recommended use  Of Senna/Colace ii BID with  Miralax as needed if he needs to take narcotics  routinely.    3.  Prophlaxis:  He hasn't had Prevnar 13, 2020 influenza or COVID 19 vaccinations.  He says that with PCN allergy (anaphylaxis) he has been very hesitant to get vaccinations.  I explained that none of the vaccines contain PCN, but he declines at this time.    4.  ACO / MERLY/Other  Quality measures  -  Alycia De La Rosa did not receive 2020 flu vaccine.  This was recommended but declined.  -  Alycia De La Rosa reports a pain score of 8.  Given his pain assessment as noted, treatment options were discussed and the following options were decided upon as a follow-up plan to address the patient's pain: prescription for opiod analgesics.  -  Current outpatient and discharge medications have been reconciled for the patient.  Reviewed by: Jade Mathis MD    5.  Follow up:   - PET/CT  - MRI Brain w/wo contrast  - Refer to Dr. Cool for port placement  - oxycodone 5 mg 1-2 tabs every 4-6 hours PRN #100   - Senna/Colace ii tabs BID  - Miralax PRN  - Labs today including CBCD, CMP, LDH, Uric acid, and nutritional studies  -  RTC to see me in 2 weeks after imaging and PAC placement.    This note was scribed for Jade Mathis MD by Ana Harper RN.    I, Jade Mathis MD, personally performed the services described in this documentation as scribed by the above named individual in my presence, and it is both accurate and complete.  07/19/2021       I spent 60 minutes with Alycia De La Rosa today.  In the office today, more than 50% of this time was spent face-to-face with him  in counseling / coordination of care, reviewing his medical history and counseling on the current treatment plan.  All questions were answered to his satisfaction      Electronically Signed by: Jade Mathis MD      CC:     MD Xavier Garza MD Barbara Michna, MD

## 2021-07-20 LAB
FOLATE SERPL-MCNC: 9.52 NG/ML (ref 4.78–24.2)
VIT B12 BLD-MCNC: 437 PG/ML (ref 211–946)

## 2021-07-26 DIAGNOSIS — C66.1 URETERAL CARCINOMA, RIGHT (HCC): Primary | ICD-10-CM

## 2021-07-30 ENCOUNTER — OFFICE VISIT (OUTPATIENT)
Dept: SURGERY | Facility: CLINIC | Age: 71
End: 2021-07-30

## 2021-07-30 VITALS
BODY MASS INDEX: 24.64 KG/M2 | HEART RATE: 62 BPM | SYSTOLIC BLOOD PRESSURE: 126 MMHG | WEIGHT: 157 LBS | DIASTOLIC BLOOD PRESSURE: 72 MMHG | HEIGHT: 67 IN

## 2021-07-30 DIAGNOSIS — Z01.818 PRE-OP TESTING: Primary | ICD-10-CM

## 2021-07-30 DIAGNOSIS — C66.1 URETERAL CARCINOMA, RIGHT (HCC): ICD-10-CM

## 2021-07-30 PROCEDURE — 99203 OFFICE O/P NEW LOW 30 MIN: CPT | Performed by: SURGERY

## 2021-07-30 RX ORDER — CLINDAMYCIN PHOSPHATE 900 MG/50ML
900 INJECTION INTRAVENOUS ONCE
Status: CANCELLED | OUTPATIENT
Start: 2021-08-04 | End: 2021-07-30

## 2021-07-30 NOTE — PROGRESS NOTES
Subjective   Alycia De La Rosa is a 71 y.o. male here today for port placement.    History of Present Illness  Mr. De La Rosa was seen in the office today to discuss port placement for his small cell bladder cancer.  He has been evaluated by medical oncology and chemotherapy has been recommended.  Patient continues to have a lot of pain making it difficult for him to sit  Allergies   Allergen Reactions   • Lisinopril Anaphylaxis and Rash   • Penicillins Anaphylaxis and Rash     Current Outpatient Medications   Medication Sig Dispense Refill   • amLODIPine (NORVASC) 10 MG tablet 10 mg.     • hydroCHLOROthiazide (HYDRODIURIL) 12.5 MG tablet 12.5 mg.     • HYDROcodone-acetaminophen (NORCO)  MG per tablet Take 1 tablet by mouth Every 4 (Four) Hours As Needed for Moderate Pain 12 tablet 0   • metoprolol tartrate (LOPRESSOR) 50 MG tablet 50 mg.     • oxyCODONE (ROXICODONE) 5 MG immediate release tablet Take 1-2 tabs every 4-6 hours as needed for pain 100 tablet 0   • sennosides-docusate (Senna-S) 8.6-50 MG per tablet Take 2 tablets by mouth 2 (Two) Times a Day. 120 tablet 5     No current facility-administered medications for this visit.     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     Past Surgical History:   Procedure Laterality Date   • CYSTOSCOPY RETROGRADE PYELOGRAM Right 3/17/2021    Procedure: CYSTOSCOPY RETROGRADE PYELOGRAM;  Surgeon: Dennis Link MD;  Location: [facility];  Service: Urology;  Laterality: Right;   • CYSTOSCOPY URETEROSCOPY Bilateral 3/17/2021    Procedure: CYSTOSCOPY TRANSURETHRAL RESECTION OF BLADDER TUMOR;  Surgeon: Dennis Link MD;  Location: [facility];  Service: Urology;  Laterality: Bilateral;   • HYDROCELE EXCISION / REPAIR    "   x2       Pertinent Review of Systems:  Respiratory: no shortness of breath  Cardiovascular: no chest pain  Other pertinent:      Objective   /72 (BP Location: Left arm)   Pulse 62   Ht 170.2 cm (67\")   Wt 71.2 kg (157 lb)   BMI 24.59 kg/m²   Physical Exam  General:  This is a WD WN male in no acute distress  Lungs:  Respiratory effort normal. Auscultation: Clear, without wheezes, rhonchi, rales  Heart:  Regular rate and rhythm, without murmur, gallop, rub.  No pedal edema    Procedures     Results/Data:  Imaging:   Notes: Records from medical oncology from 7/19/2021 were reviewed  Lab: CMP and CBC from 7/19/2021 were reviewed.  Other: Pathology result was reviewed.    Assessment/Plan   Small cell bladder cancer    Proceed with port placement         Discussion/Summary: The risks of the surgical procedure were discussed.  Options of alternative treatments including no treatment (if applicable) were discussed.  Patient voiced understanding of the above issues and wishes to proceed    Time spent:     Patient's Body mass index is 24.59 kg/m². indicating that he is within normal range (BMI 18.5-24.9). No BMI management plan needed..       Future Appointments   Date Time Provider Department Center   7/30/2021  8:40 AM Katy Cool MD [facility]   8/6/2021  8:30 AM COR PET 1 BH COR PE IS [facility]   8/9/2021  8:00 AM COR SOUTH MRI 1 BH COR MR IS [facility]   8/10/2021  9:00 AM ANA NURSE LAB [facility] COR   8/10/2021  9:30 AM Jade Mathis MD [facility]         Please note that portions of this note were completed with a voice recognition program.  "

## 2021-08-02 ENCOUNTER — LAB (OUTPATIENT)
Dept: LAB | Facility: HOSPITAL | Age: 71
End: 2021-08-02

## 2021-08-02 DIAGNOSIS — Z01.818 PRE-OP TESTING: ICD-10-CM

## 2021-08-02 PROCEDURE — U0004 COV-19 TEST NON-CDC HGH THRU: HCPCS

## 2021-08-02 PROCEDURE — C9803 HOPD COVID-19 SPEC COLLECT: HCPCS

## 2021-08-03 LAB — SARS-COV-2 RNA NOSE QL NAA+PROBE: NOT DETECTED

## 2021-08-04 ENCOUNTER — ANESTHESIA (OUTPATIENT)
Dept: PERIOP | Facility: HOSPITAL | Age: 71
End: 2021-08-04

## 2021-08-04 ENCOUNTER — ANESTHESIA EVENT (OUTPATIENT)
Dept: PERIOP | Facility: HOSPITAL | Age: 71
End: 2021-08-04

## 2021-08-04 ENCOUNTER — APPOINTMENT (OUTPATIENT)
Dept: GENERAL RADIOLOGY | Facility: HOSPITAL | Age: 71
End: 2021-08-04

## 2021-08-04 ENCOUNTER — HOSPITAL ENCOUNTER (OUTPATIENT)
Facility: HOSPITAL | Age: 71
Setting detail: HOSPITAL OUTPATIENT SURGERY
Discharge: HOME | End: 2021-08-04
Attending: SURGERY | Admitting: SURGERY

## 2021-08-04 VITALS
TEMPERATURE: 97.8 F | RESPIRATION RATE: 14 BRPM | HEART RATE: 65 BPM | BODY MASS INDEX: 24.3 KG/M2 | WEIGHT: 154.8 LBS | DIASTOLIC BLOOD PRESSURE: 88 MMHG | HEIGHT: 67 IN | SYSTOLIC BLOOD PRESSURE: 145 MMHG | OXYGEN SATURATION: 96 %

## 2021-08-04 DIAGNOSIS — C66.1 URETERAL CARCINOMA, RIGHT (HCC): ICD-10-CM

## 2021-08-04 PROCEDURE — 25010000002 KETOROLAC TROMETHAMINE PER 15 MG: Performed by: NURSE ANESTHETIST, CERTIFIED REGISTERED

## 2021-08-04 PROCEDURE — 71045 X-RAY EXAM CHEST 1 VIEW: CPT | Performed by: RADIOLOGY

## 2021-08-04 PROCEDURE — 36561 INSERT TUNNELED CV CATH: CPT | Performed by: SURGERY

## 2021-08-04 PROCEDURE — C1788 PORT, INDWELLING, IMP: HCPCS | Performed by: SURGERY

## 2021-08-04 PROCEDURE — 71045 X-RAY EXAM CHEST 1 VIEW: CPT

## 2021-08-04 PROCEDURE — 25010000002 FENTANYL CITRATE (PF) 50 MCG/ML SOLUTION: Performed by: NURSE ANESTHETIST, CERTIFIED REGISTERED

## 2021-08-04 PROCEDURE — 77001 FLUOROGUIDE FOR VEIN DEVICE: CPT | Performed by: SURGERY

## 2021-08-04 PROCEDURE — 25010000002 PROPOFOL 10 MG/ML EMULSION: Performed by: NURSE ANESTHETIST, CERTIFIED REGISTERED

## 2021-08-04 PROCEDURE — 25010000002 HEPARIN (PORCINE) PER 1000 UNITS: Performed by: SURGERY

## 2021-08-04 PROCEDURE — 76000 FLUOROSCOPY <1 HR PHYS/QHP: CPT | Performed by: RADIOLOGY

## 2021-08-04 PROCEDURE — 76000 FLUOROSCOPY <1 HR PHYS/QHP: CPT

## 2021-08-04 DEVICE — PRT INTRO VASC/INTERV VORTEX FILL/HL DETACH/POLYURET/CATH 8F: Type: IMPLANTABLE DEVICE | Status: FUNCTIONAL

## 2021-08-04 RX ORDER — SODIUM CHLORIDE, SODIUM LACTATE, POTASSIUM CHLORIDE, CALCIUM CHLORIDE 600; 310; 30; 20 MG/100ML; MG/100ML; MG/100ML; MG/100ML
125 INJECTION, SOLUTION INTRAVENOUS ONCE
Status: COMPLETED | OUTPATIENT
Start: 2021-08-04 | End: 2021-08-04

## 2021-08-04 RX ORDER — SODIUM CHLORIDE 0.9 % (FLUSH) 0.9 %
10 SYRINGE (ML) INJECTION EVERY 12 HOURS SCHEDULED
Status: DISCONTINUED | OUTPATIENT
Start: 2021-08-04 | End: 2021-08-04 | Stop reason: HOSPADM

## 2021-08-04 RX ORDER — SODIUM CHLORIDE 9 MG/ML
INJECTION, SOLUTION INTRAVENOUS AS NEEDED
Status: DISCONTINUED | OUTPATIENT
Start: 2021-08-04 | End: 2021-08-04 | Stop reason: HOSPADM

## 2021-08-04 RX ORDER — PROPOFOL 10 MG/ML
VIAL (ML) INTRAVENOUS AS NEEDED
Status: DISCONTINUED | OUTPATIENT
Start: 2021-08-04 | End: 2021-08-04 | Stop reason: SURG

## 2021-08-04 RX ORDER — KETOROLAC TROMETHAMINE 30 MG/ML
15 INJECTION, SOLUTION INTRAMUSCULAR; INTRAVENOUS EVERY 6 HOURS PRN
Status: COMPLETED | OUTPATIENT
Start: 2021-08-04 | End: 2021-08-04

## 2021-08-04 RX ORDER — CLINDAMYCIN PHOSPHATE 900 MG/50ML
900 INJECTION INTRAVENOUS ONCE
Status: COMPLETED | OUTPATIENT
Start: 2021-08-04 | End: 2021-08-04

## 2021-08-04 RX ORDER — SODIUM CHLORIDE 0.9 % (FLUSH) 0.9 %
10 SYRINGE (ML) INJECTION AS NEEDED
Status: DISCONTINUED | OUTPATIENT
Start: 2021-08-04 | End: 2021-08-04 | Stop reason: HOSPADM

## 2021-08-04 RX ORDER — DROPERIDOL 2.5 MG/ML
0.62 INJECTION, SOLUTION INTRAMUSCULAR; INTRAVENOUS ONCE AS NEEDED
Status: DISCONTINUED | OUTPATIENT
Start: 2021-08-04 | End: 2021-08-04 | Stop reason: HOSPADM

## 2021-08-04 RX ORDER — HEPARIN SODIUM 5000 [USP'U]/ML
INJECTION, SOLUTION INTRAVENOUS; SUBCUTANEOUS AS NEEDED
Status: DISCONTINUED | OUTPATIENT
Start: 2021-08-04 | End: 2021-08-04 | Stop reason: HOSPADM

## 2021-08-04 RX ORDER — OXYCODONE HYDROCHLORIDE AND ACETAMINOPHEN 5; 325 MG/1; MG/1
1 TABLET ORAL ONCE AS NEEDED
Status: COMPLETED | OUTPATIENT
Start: 2021-08-04 | End: 2021-08-04

## 2021-08-04 RX ORDER — LIDOCAINE HYDROCHLORIDE 10 MG/ML
INJECTION, SOLUTION INFILTRATION; PERINEURAL AS NEEDED
Status: DISCONTINUED | OUTPATIENT
Start: 2021-08-04 | End: 2021-08-04 | Stop reason: HOSPADM

## 2021-08-04 RX ORDER — SODIUM CHLORIDE, SODIUM LACTATE, POTASSIUM CHLORIDE, CALCIUM CHLORIDE 600; 310; 30; 20 MG/100ML; MG/100ML; MG/100ML; MG/100ML
INJECTION, SOLUTION INTRAVENOUS CONTINUOUS PRN
Status: DISCONTINUED | OUTPATIENT
Start: 2021-08-04 | End: 2021-08-04 | Stop reason: SURG

## 2021-08-04 RX ORDER — HYDROCODONE BITARTRATE AND ACETAMINOPHEN 7.5; 325 MG/1; MG/1
1 TABLET ORAL 4 TIMES DAILY PRN
Qty: 8 TABLET | Refills: 0 | Status: SHIPPED | OUTPATIENT
Start: 2021-08-04 | End: 2022-08-15

## 2021-08-04 RX ORDER — SODIUM CHLORIDE, SODIUM LACTATE, POTASSIUM CHLORIDE, CALCIUM CHLORIDE 600; 310; 30; 20 MG/100ML; MG/100ML; MG/100ML; MG/100ML
100 INJECTION, SOLUTION INTRAVENOUS ONCE AS NEEDED
Status: DISCONTINUED | OUTPATIENT
Start: 2021-08-04 | End: 2021-08-04 | Stop reason: HOSPADM

## 2021-08-04 RX ORDER — MIDAZOLAM HYDROCHLORIDE 1 MG/ML
0.5 INJECTION INTRAMUSCULAR; INTRAVENOUS
Status: DISCONTINUED | OUTPATIENT
Start: 2021-08-04 | End: 2021-08-04 | Stop reason: HOSPADM

## 2021-08-04 RX ORDER — IPRATROPIUM BROMIDE AND ALBUTEROL SULFATE 2.5; .5 MG/3ML; MG/3ML
3 SOLUTION RESPIRATORY (INHALATION) ONCE AS NEEDED
Status: DISCONTINUED | OUTPATIENT
Start: 2021-08-04 | End: 2021-08-04 | Stop reason: HOSPADM

## 2021-08-04 RX ORDER — MEPERIDINE HYDROCHLORIDE 25 MG/ML
12.5 INJECTION INTRAMUSCULAR; INTRAVENOUS; SUBCUTANEOUS
Status: DISCONTINUED | OUTPATIENT
Start: 2021-08-04 | End: 2021-08-04 | Stop reason: HOSPADM

## 2021-08-04 RX ORDER — ONDANSETRON 2 MG/ML
4 INJECTION INTRAMUSCULAR; INTRAVENOUS AS NEEDED
Status: DISCONTINUED | OUTPATIENT
Start: 2021-08-04 | End: 2021-08-04 | Stop reason: HOSPADM

## 2021-08-04 RX ORDER — FENTANYL CITRATE 50 UG/ML
50 INJECTION, SOLUTION INTRAMUSCULAR; INTRAVENOUS
Status: DISCONTINUED | OUTPATIENT
Start: 2021-08-04 | End: 2021-08-04 | Stop reason: HOSPADM

## 2021-08-04 RX ORDER — MAGNESIUM HYDROXIDE 1200 MG/15ML
LIQUID ORAL AS NEEDED
Status: DISCONTINUED | OUTPATIENT
Start: 2021-08-04 | End: 2021-08-04 | Stop reason: HOSPADM

## 2021-08-04 RX ADMIN — PROPOFOL 50 MG: 10 INJECTION, EMULSION INTRAVENOUS at 07:34

## 2021-08-04 RX ADMIN — CLINDAMYCIN PHOSPHATE 900 MG: 900 INJECTION, SOLUTION INTRAVENOUS at 07:30

## 2021-08-04 RX ADMIN — FENTANYL CITRATE 50 MCG: 50 INJECTION INTRAMUSCULAR; INTRAVENOUS at 08:31

## 2021-08-04 RX ADMIN — SODIUM CHLORIDE, POTASSIUM CHLORIDE, SODIUM LACTATE AND CALCIUM CHLORIDE: 600; 310; 30; 20 INJECTION, SOLUTION INTRAVENOUS at 07:30

## 2021-08-04 RX ADMIN — KETOROLAC TROMETHAMINE 15 MG: 30 INJECTION, SOLUTION INTRAMUSCULAR; INTRAVENOUS at 08:36

## 2021-08-04 RX ADMIN — PROPOFOL 30 MG: 10 INJECTION, EMULSION INTRAVENOUS at 07:35

## 2021-08-04 RX ADMIN — SODIUM CHLORIDE, POTASSIUM CHLORIDE, SODIUM LACTATE AND CALCIUM CHLORIDE 125 ML/HR: 600; 310; 30; 20 INJECTION, SOLUTION INTRAVENOUS at 07:17

## 2021-08-04 RX ADMIN — OXYCODONE HYDROCHLORIDE AND ACETAMINOPHEN 1 TABLET: 5; 325 TABLET ORAL at 08:35

## 2021-08-04 NOTE — OP NOTE
[id number] insertion    Surgeon:  Katy Cool M.D., ETHAN    Assistant: none    Indications: This patient presents for placement of an [id number] for chemotherapy    Pre-operative Diagnosis: ureteral carcinoma    Post-operative Diagnosis:  Same    Anesthesia: general    Procedure Details   After obtaining informed consent and receiving preoperative antibiotic patient was taken to the operating room and placed in the supine position.  After administration of anesthesia the chest and neck were prepped and draped in sterile fashion.  The left subclavian vein was cannulated with use of the Seldinger technique.  The guidewire was passed and position was confirmed under fluoroscopy.  Following this, a transverse incision was made inferior to the site of guidewire entry and carried down to the pectoralis fascia.  A pocket large enough to admit the port without tension was created.  The guidewire was brought through the incision.    The port and catheter were attached and flushed with heparinized saline, 50 units per mL.  The catheter was cut to appropriate length.  Under fluoroscopic guidance, the dilator sheath was passed over the guidewire.  The guidewire and dilator were removed and the catheter was passed over the peel-away sheath which was then removed.  There was no resistance to irrigation or aspiration of blood.  Fluoroscopy confirmed the catheter to be in good position without kinking.  The port was then sutured in with 3-0 Prolene sutures.  After ensuring hemostasis, the skin was closed in a 2 layer closure of 3-0 Vicryl sutures to Mayo's fascia.  The skin was closed with a 4-0 Vicryl subcuticular stitch.  Dressing was placed.     Patient tolerated the procedure well, was taken to the recovery room in stable condition where chest x-ray pending    Instrument, sponge, and needle counts were correct at closure and at the conclusion of the case.     Findings:  Flouroscopy demonstrated good position of  the port    Estimated Blood Loss: 10 mL or less    Blood administered:  none           Drains: None           Specimens: None        Grafts and Implants: yes         Complications: none           Condition: Stable

## 2021-08-04 NOTE — ANESTHESIA PREPROCEDURE EVALUATION
Anesthesia Evaluation     Patient summary reviewed and Nursing notes reviewed   NPO Solid Status: > 8 hours  NPO Liquid Status: > 8 hours           Airway   Mallampati: I  TM distance: >3 FB  Neck ROM: full  No difficulty expected  Dental    (+) edentulous    Pulmonary     breath sounds clear to auscultation  (+) asthma,  Cardiovascular   Exercise tolerance: good (4-7 METS)    Rhythm: regular  Rate: normal    (+) hypertension,       Neuro/Psych  (+) TIA (29 years age), CVA (4 years ago) residual symptoms, weakness (left eye),     GI/Hepatic/Renal/Endo    (+)  GERD,  renal disease,     Musculoskeletal     Abdominal     Abdomen: soft.   Substance History      OB/GYN          Other   arthritis,    history of cancer                  Anesthesia Plan    ASA 3     general     intravenous induction     Anesthetic plan, all risks, benefits, and alternatives have been provided, discussed and informed consent has been obtained with: patient.    Plan discussed with CRNA.

## 2021-08-04 NOTE — ANESTHESIA PROCEDURE NOTES
Airway  Urgency: elective    Date/Time: 8/4/2021 7:30 AM  Airway not difficult    General Information and Staff    Patient location during procedure: OR  Anesthesiologist: Kenneth Arnold MD  CRNA: Alex Pope CRNA    Indications and Patient Condition    Preoxygenated: yes  MILS not maintained throughout  Mask difficulty assessment: 0 - not attempted    Final Airway Details  Final airway type: supraglottic airway      Successful airway: unique  Size 4    Number of attempts at approach: 1  Assessment: lips, teeth, and gum same as pre-op and atraumatic intubation    Additional Comments  Atraumatic LMA placement, dentition unchanged.

## 2021-08-04 NOTE — ANESTHESIA POSTPROCEDURE EVALUATION
Patient: Alycia De La Rosa    Procedure Summary     Date: 08/04/21 Room / Location: [facility] OR 01 /  COR OR    Anesthesia Start: 0730 Anesthesia Stop: 0805    Procedure: INSERTION VENOUS ACCESS DEVICE (Left ) Diagnosis:       Ureteral carcinoma, right (CMS/HCC)      (Ureteral carcinoma, right (CMS/HCC) [C66.1])    Surgeons: Katy Cool MD Provider: Kenneth Arnold MD    Anesthesia Type: general ASA Status: 3          Anesthesia Type: general    Vitals  Vitals Value Taken Time   /95 08/04/21 0836   Temp 98.1 °F (36.7 °C) 08/04/21 0806   Pulse 66 08/04/21 0836   Resp 15 08/04/21 0836   SpO2 97 % 08/04/21 0836           Post Anesthesia Care and Evaluation    Patient location during evaluation: PHASE II  Patient participation: complete - patient participated  Level of consciousness: awake and alert  Pain score: 8  Pain management: adequate  Airway patency: patent  Anesthetic complications: No anesthetic complications    Cardiovascular status: acceptable  Respiratory status: acceptable  Hydration status: acceptable

## 2021-08-06 ENCOUNTER — HOSPITAL ENCOUNTER (OUTPATIENT)
Dept: PET IMAGING | Facility: HOSPITAL | Age: 71
Discharge: HOME | End: 2021-08-06
Admitting: INTERNAL MEDICINE

## 2021-08-06 DIAGNOSIS — C66.1 URETERAL CARCINOMA, RIGHT (HCC): ICD-10-CM

## 2021-08-06 PROCEDURE — A9552 F18 FDG: HCPCS | Performed by: INTERNAL MEDICINE

## 2021-08-06 PROCEDURE — 0 FLUDEOXYGLUCOSE F18 SOLUTION: Performed by: INTERNAL MEDICINE

## 2021-08-06 PROCEDURE — 78815 PET IMAGE W/CT SKULL-THIGH: CPT | Performed by: RADIOLOGY

## 2021-08-06 PROCEDURE — 78815 PET IMAGE W/CT SKULL-THIGH: CPT

## 2021-08-06 RX ADMIN — FLUDEOXYGLUCOSE F18 1 DOSE: 300 INJECTION INTRAVENOUS at 09:00

## 2021-08-09 ENCOUNTER — HOSPITAL ENCOUNTER (OUTPATIENT)
Dept: MRI IMAGING | Facility: HOSPITAL | Age: 71
Discharge: HOME | End: 2021-08-09

## 2021-08-09 DIAGNOSIS — C66.1 URETERAL CARCINOMA, RIGHT (HCC): ICD-10-CM

## 2021-08-10 ENCOUNTER — DOCUMENTATION (OUTPATIENT)
Dept: ONCOLOGY | Facility: CLINIC | Age: 71
End: 2021-08-10

## 2021-08-10 ENCOUNTER — OFFICE VISIT (OUTPATIENT)
Dept: ONCOLOGY | Facility: CLINIC | Age: 71
End: 2021-08-10

## 2021-08-10 VITALS
RESPIRATION RATE: 18 BRPM | SYSTOLIC BLOOD PRESSURE: 121 MMHG | OXYGEN SATURATION: 99 % | HEART RATE: 82 BPM | DIASTOLIC BLOOD PRESSURE: 73 MMHG | TEMPERATURE: 97.1 F | BODY MASS INDEX: 24.28 KG/M2 | WEIGHT: 155 LBS

## 2021-08-10 DIAGNOSIS — T45.1X5A CHEMOTHERAPY INDUCED NEUTROPENIA (HCC): ICD-10-CM

## 2021-08-10 DIAGNOSIS — D70.1 CHEMOTHERAPY INDUCED NEUTROPENIA (HCC): ICD-10-CM

## 2021-08-10 DIAGNOSIS — C66.1 URETERAL CARCINOMA, RIGHT (HCC): Primary | ICD-10-CM

## 2021-08-10 DIAGNOSIS — G89.3 NEOPLASM RELATED PAIN: ICD-10-CM

## 2021-08-10 DIAGNOSIS — C67.9 SMALL CELL CARCINOMA OF BLADDER (HCC): ICD-10-CM

## 2021-08-10 LAB
ALBUMIN SERPL-MCNC: 4.56 G/DL (ref 3.5–5.2)
ALBUMIN/GLOB SERPL: 1.4 G/DL
ALP SERPL-CCNC: 80 U/L (ref 39–117)
ALT SERPL W P-5'-P-CCNC: 16 U/L (ref 1–41)
ANION GAP SERPL CALCULATED.3IONS-SCNC: 13.1 MMOL/L (ref 5–15)
AST SERPL-CCNC: 27 U/L (ref 1–40)
BASOPHILS # BLD AUTO: 0.05 10*3/MM3 (ref 0–0.2)
BASOPHILS NFR BLD AUTO: 0.5 % (ref 0–1.5)
BILIRUB SERPL-MCNC: 0.6 MG/DL (ref 0–1.2)
BUN SERPL-MCNC: 15 MG/DL (ref 8–23)
BUN/CREAT SERPL: 9.6 (ref 7–25)
CALCIUM SPEC-SCNC: 10.6 MG/DL (ref 8.6–10.5)
CHLORIDE SERPL-SCNC: 98 MMOL/L (ref 98–107)
CO2 SERPL-SCNC: 28.9 MMOL/L (ref 22–29)
CREAT SERPL-MCNC: 1.57 MG/DL (ref 0.76–1.27)
DEPRECATED RDW RBC AUTO: 38.5 FL (ref 37–54)
EOSINOPHIL # BLD AUTO: 0.14 10*3/MM3 (ref 0–0.4)
EOSINOPHIL NFR BLD AUTO: 1.5 % (ref 0.3–6.2)
ERYTHROCYTE [DISTWIDTH] IN BLOOD BY AUTOMATED COUNT: 12 % (ref 12.3–15.4)
GFR SERPL CREATININE-BSD FRML MDRD: 44 ML/MIN/1.73
GLOBULIN UR ELPH-MCNC: 3.3 GM/DL
GLUCOSE SERPL-MCNC: 125 MG/DL (ref 65–99)
HCT VFR BLD AUTO: 45.6 % (ref 37.5–51)
HGB BLD-MCNC: 15.9 G/DL (ref 13–17.7)
IMM GRANULOCYTES # BLD AUTO: 0.02 10*3/MM3 (ref 0–0.05)
IMM GRANULOCYTES NFR BLD AUTO: 0.2 % (ref 0–0.5)
LDH SERPL-CCNC: 508 U/L (ref 135–225)
LYMPHOCYTES # BLD AUTO: 0.99 10*3/MM3 (ref 0.7–3.1)
LYMPHOCYTES NFR BLD AUTO: 10.8 % (ref 19.6–45.3)
MCH RBC QN AUTO: 30.6 PG (ref 26.6–33)
MCHC RBC AUTO-ENTMCNC: 34.9 G/DL (ref 31.5–35.7)
MCV RBC AUTO: 87.9 FL (ref 79–97)
MONOCYTES # BLD AUTO: 0.85 10*3/MM3 (ref 0.1–0.9)
MONOCYTES NFR BLD AUTO: 9.3 % (ref 5–12)
NEUTROPHILS NFR BLD AUTO: 7.08 10*3/MM3 (ref 1.7–7)
NEUTROPHILS NFR BLD AUTO: 77.7 % (ref 42.7–76)
NRBC BLD AUTO-RTO: 0 /100 WBC (ref 0–0.2)
PLATELET # BLD AUTO: 249 10*3/MM3 (ref 140–450)
PMV BLD AUTO: 9.1 FL (ref 6–12)
POTASSIUM SERPL-SCNC: 3.7 MMOL/L (ref 3.5–5.2)
PROT SERPL-MCNC: 7.9 G/DL (ref 6–8.5)
RBC # BLD AUTO: 5.19 10*6/MM3 (ref 4.14–5.8)
SODIUM SERPL-SCNC: 140 MMOL/L (ref 136–145)
URATE SERPL-MCNC: 8.7 MG/DL (ref 3.4–7)
WBC # BLD AUTO: 9.13 10*3/MM3 (ref 3.4–10.8)

## 2021-08-10 PROCEDURE — 83615 LACTATE (LD) (LDH) ENZYME: CPT | Performed by: INTERNAL MEDICINE

## 2021-08-10 PROCEDURE — 84550 ASSAY OF BLOOD/URIC ACID: CPT | Performed by: INTERNAL MEDICINE

## 2021-08-10 PROCEDURE — 85025 COMPLETE CBC W/AUTO DIFF WBC: CPT | Performed by: INTERNAL MEDICINE

## 2021-08-10 PROCEDURE — 80053 COMPREHEN METABOLIC PANEL: CPT | Performed by: INTERNAL MEDICINE

## 2021-08-10 PROCEDURE — 99214 OFFICE O/P EST MOD 30 MIN: CPT | Performed by: INTERNAL MEDICINE

## 2021-08-10 RX ORDER — SODIUM CHLORIDE 9 MG/ML
250 INJECTION, SOLUTION INTRAVENOUS ONCE
Status: CANCELLED | OUTPATIENT
Start: 2021-08-18

## 2021-08-10 RX ORDER — SODIUM CHLORIDE 9 MG/ML
250 INJECTION, SOLUTION INTRAVENOUS ONCE
Status: CANCELLED | OUTPATIENT
Start: 2021-08-20

## 2021-08-10 RX ORDER — ALLOPURINOL 300 MG/1
300 TABLET ORAL DAILY
Qty: 30 TABLET | Refills: 3 | Status: SHIPPED | OUTPATIENT
Start: 2021-08-10 | End: 2021-09-30

## 2021-08-10 RX ORDER — OLANZAPINE 5 MG/1
5 TABLET ORAL ONCE
Status: CANCELLED | OUTPATIENT
Start: 2021-08-18 | End: 2021-08-16

## 2021-08-10 RX ORDER — FAMOTIDINE 10 MG/ML
20 INJECTION, SOLUTION INTRAVENOUS AS NEEDED
Status: CANCELLED | OUTPATIENT
Start: 2021-08-18

## 2021-08-10 RX ORDER — SODIUM CHLORIDE 9 MG/ML
250 INJECTION, SOLUTION INTRAVENOUS ONCE
Status: CANCELLED | OUTPATIENT
Start: 2021-08-19

## 2021-08-10 RX ORDER — OXYCODONE HYDROCHLORIDE 5 MG/1
TABLET ORAL
Qty: 180 TABLET | Refills: 0 | Status: SHIPPED | OUTPATIENT
Start: 2021-08-10 | End: 2022-08-15 | Stop reason: SDUPTHER

## 2021-08-10 RX ORDER — LIDOCAINE AND PRILOCAINE 25; 25 MG/G; MG/G
CREAM TOPICAL
Qty: 30 G | Refills: 5 | Status: SHIPPED | OUTPATIENT
Start: 2021-08-10

## 2021-08-10 RX ORDER — PALONOSETRON 0.05 MG/ML
0.25 INJECTION, SOLUTION INTRAVENOUS ONCE
Status: CANCELLED | OUTPATIENT
Start: 2021-08-18

## 2021-08-10 RX ORDER — DIPHENHYDRAMINE HYDROCHLORIDE 50 MG/ML
50 INJECTION INTRAMUSCULAR; INTRAVENOUS AS NEEDED
Status: CANCELLED | OUTPATIENT
Start: 2021-08-18

## 2021-08-10 RX ORDER — MORPHINE SULFATE 15 MG/1
15 TABLET, FILM COATED, EXTENDED RELEASE ORAL EVERY 8 HOURS SCHEDULED
Qty: 90 TABLET | Refills: 0 | Status: SHIPPED | OUTPATIENT
Start: 2021-08-10 | End: 2022-08-15

## 2021-08-10 NOTE — PROGRESS NOTES
NAME: Alycia De La Rosa    : 1950    DATE OF CONSULTATION:  8/10/2021    DIAGNOSIS:   Small cell bladder cancer, Limited stage disease    REFERRING PHYSICIAN:  No ref. provider found    CHIEF COMPLAINT:  Small Cell Bladder cancer      HISTORY OF PRESENT ILLNESS:   Alycia De La Rosa is a very pleasant 71 y.o. male who is being seen today at the request of Dr. Xavier Arnold for evaluation and treatment of small cell bladder cancer. He first developed gross hematuria in 2021 and was seen by his PCP Dr. Alec Soto.  He had imaging revealing hydronephrosis due to obstruction of the R ureteral orifice with non-functioning R kidney and was referred to Dr. Link who performed cystoscopy with biopsy on 3-17-21.  Cystoscopy revealed a normal urethra nd prostate with indented R ureteral orfice with obvious tumor behind it.  He performed transurethral resection until obvious ureter and sent tumor for pathologic examination.  This showed high grade urothelial carcinoma .  Muscularis propria was present but uninvolved.  Dr. Link referred him to Dr. Sylvester for consideration of R nephrectomy / ureterectomy.  Mr. De La Rosa was referred to Dr. Sylvester who performed repeat cystoscopy 21.  This revealed a large mass pushing into the bladder mucosa / external compression with mass in the R saad-trigone and R bladder wall.  R ureteral orifice could not be identified.  A 10x8 cm mass was resected.  PAthology showed small cell carcinoma with invasion of the muscularis propria as well as focal conventional high grade urothelial carcinoma.  Dr. Sylvester referred him to Dr. Xavier Arnold given small cell histology.  Dr. Lott recommended imaging for staging and treatment with Carbo/Etoposide/Atezolizumab. He was referred here because he preferred to receive treatment close to home.    Mr. De La Rosa presents today with his daughter (who works with Dr. Otoole).  He is overall doing well.  He has had 2 episodes of gross hematuria in total, but this  "hasn't been a persistent problem.  He denies weight loss.  No chest pain or shortness of breath. He complains of RLQ pelvic pain which radiates into his R hip/ R upper thigh.  He has been using Tylenol for pain which he says is sufficient, but he is clearly in pain in the office today and is unable to sit in the chair because of pain.         INTERVAL HISTORY:   Mr. De La Rosa is here today for follow up of small cell bladder cancer. Sicne his last visit, he has had PET which showed no evidence of distant metastatic spread. He attempted MRI brain but was unable to lie flat.  He complains of pain. He has been taking Oxycodone 5 mg q4h around the clock. 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       PAST SURGICAL HISTORY:  Past Surgical History:   Procedure Laterality Date   • CYSTOSCOPY RETROGRADE PYELOGRAM Right 3/17/2021    Procedure: CYSTOSCOPY RETROGRADE PYELOGRAM;  Surgeon: Dennis Link MD;  Location: [facility];  Service: Urology;  Laterality: Right;   • CYSTOSCOPY URETEROSCOPY Bilateral 3/17/2021    Procedure: CYSTOSCOPY TRANSURETHRAL RESECTION OF BLADDER TUMOR;  Surgeon: Dennis Link MD;  Location: [facility];  Service: Urology;  Laterality: Bilateral;   • HYDROCELE EXCISION / REPAIR      x2   • VENOUS ACCESS DEVICE (PORT) INSERTION Left 8/4/2021    Procedure: INSERTION VENOUS ACCESS DEVICE;  Surgeon: Katy Cool MD;  Location: [facility];  Service: General;  Laterality: Left;       FAMILY HISTORY:  Family History   Problem Relation Age of Onset   • Cancer Father 84        prostate   • Cancer Mother 73        mouth "   • Cancer Maternal Aunt         mouth   • Cancer Maternal Grandfather          SOCIAL HISTORY:  Social History     Socioeconomic History   • Marital status:      Spouse name: Not on file   • Number of children: Not on file   • Years of education: Not on file   • Highest education level: Not on file   Tobacco Use   • Smoking status: Former Smoker     Packs/day: 3.00     Years: 5.00     Pack years: 15.00     Types: Cigarettes     Quit date:      Years since quittin.6   • Smokeless tobacco: Former User   Vaping Use   • Vaping Use: Never used   Substance and Sexual Activity   • Alcohol use: Not Currently   • Drug use: Never   • Sexual activity: Defer     Social History     Social History Narrative    He used to work as a  and denies any unusual chemical exposures.        REVIEW OF SYSTEMS:   A comprehensive 14 point review of systems was performed.  Significant findings as mentioned above.  All other systems reviewed and are negative.      MEDICATIONS:  The current medication list was reviewed in the EMR    Current Outpatient Medications:   •  amLODIPine (NORVASC) 10 MG tablet, 10 mg., Disp: , Rfl:   •  hydroCHLOROthiazide (HYDRODIURIL) 12.5 MG tablet, 12.5 mg., Disp: , Rfl:   •  HYDROcodone-acetaminophen (NORCO)  MG per tablet, Take 1 tablet by mouth Every 4 (Four) Hours As Needed for Moderate Pain (Patient not taking: Reported on 2021), Disp: 12 tablet, Rfl: 0  •  HYDROcodone-acetaminophen (Norco) 7.5-325 MG per tablet, Take 1 tablet by mouth 4 (Four) Times a Day As Needed for Moderate Pain ., Disp: 8 tablet, Rfl: 0  •  metoprolol tartrate (LOPRESSOR) 50 MG tablet, 50 mg., Disp: , Rfl:   •  oxyCODONE (ROXICODONE) 5 MG immediate release tablet, Take 1-2 tabs every 4-6 hours as needed for pain, Disp: 100 tablet, Rfl: 0  •  sennosides-docusate (Senna-S) 8.6-50 MG per tablet, Take 2 tablets by mouth 2 (Two) Times a Day., Disp: 120 tablet, Rfl: 5    ALLERGIES:    Allergies   Allergen  Reactions   • Lisinopril Anaphylaxis and Rash   • Penicillins Anaphylaxis and Rash       PHYSICAL EXAM:  Vitals:    08/10/21 0902   BP: 121/73   Pulse: 82   Resp: 18   Temp: 97.1 °F (36.2 °C)   SpO2: 99%     Pain Score    08/10/21 0902   PainSc: 10-Worst pain ever     General:  Awake, alert and oriented, appears somewhat uncomfortable today  HEENT:  Pupils are equal, round and reactive to light and accommodation, Extra-ocular movements full, Oropharyx clear, mucous membranes moist  Neck:  No JVD, thyromegaly or lymphadenopathy  CV:  Regular rate and rhythm, no murmurs, rubs or gallops  Resp:  Lungs are clear to auscultation bilaterally, no wheezing  Abd:  Soft, non-tender, non-distended, bowel sounds present, no organomegaly or masses  Ext:  No clubbing, cyanosis or edema  Lymph:  No cervical, supraclavicular, axillary, inguinal or femoral adenopathy  Neuro:  MS as above, CN II-XII intact, grossly non-focal exam      PATHOLOGY:  03-17-21 04-20-21 06-14-21          ENDOSCOPY:  Cystoscopy 03-17-21 ([facility]):  Findings: Probable right distal ureteral tumor with nonfunctioning kidney and massive dilation      Cystourethroscopy, TURP 06-14-21 ([facility])  Findings:   1.Left ureteral orifice identified. Retrograde pyelography without hydronephrosis or evidence of filling defect.   2. Large mass pushing into bladder mucosa/external compression- no obvious urothelial papillar lesion. Mass located in the right saad-trigone and right bladder wall. Inability to identify right ureteral orifice  3. Resection of mass with resection size of 10cm x 8 cm.   4. Hemostasis achieved at conclusion of case  5. Placement of 20 F 2 way catheter         IMAGING:  CT Chest 04-12-21  FINDINGS:    LUNGS:  Unremarkable.  No mass.  No consolidation.    PLEURAL SPACE:  Unremarkable.  No pneumothorax.  No significant  effusion.    HEART:  Unremarkable.  No cardiomegaly.  No significant pericardial  effusion.    MEDIASTINUM:   Moderate-sized hiatal hernia.    BONES/JOINTS:  Unremarkable.  No acute fracture.  No dislocation.    SOFT TISSUES:  Unremarkable.    VASCULATURE:  Unremarkable.  No thoracic aortic aneurysm.    LYMPH NODES:  Unremarkable.  No enlarged lymph nodes.    LIVER:  Left lobe of liver cyst measuring 3 cm.    KIDNEYS AND URETERS:  Incidentally imaged right upper kidney shows  moderate hydronephrosis and cortical thinning.     IMPRESSION:    Moderate-sized hiatal hernia.        PET/CT 08-06-21  FINDINGS:      HEAD/NECK:  No FDG hypermetabolic neck adenopathy.  No FDG hypermetabolic masses.     CHEST:   No FDG hypermetabolic thoracic adenopathy.  No FDG hypermetabolic lung nodules or masses.  Evaluation for tiny parenchymal nodules is somewhat limited on low dose  CT secondary to respiratory motion.     ABDOMEN/PELVIS:   The right kidney shows cortical thinning and the right collecting system  is diffusely dilated to the level of the urinary bladder where the wall  is thickened. The left kidney shows diffuse left-sided dilatation as  well.  The pelvis shows heterogeneous soft tissue mass which appears to be  intimate with the prostate. Significantly larger today than on the  previous CT scan.  Large right-sided hydrocele is present.  There is hypermetabolic activity throughout the dilated left ureter as  well as in the left renal pelvis. This likely represents excreted  radiotracer.  Extensive increased activity intermixed in the heterogeneous appearance  of the prostate.     BONES: There are scattered foci of FDG hypermetabolism most suggestive  of degenerative change seen throughout the axial skeleton. If concern  persist for metastatic lesions of the bone, HDP Bone scan is recommended  for further evaluation.     IMPRESSION:     1. Hydronephrosis bilaterally and hydroureter bilaterally to the level  of a thickened urinary bladder.  2. Diffuse increased activity throughout the left collecting system as  well as in the  bladder.  3. Heterogeneous soft tissue mass in the pelvis intimate with the  prostate. The periphery does show hypermetabolic activity. Cannot  completely separate this activity from the bladder. Maximum SUV is  9.656. Urologic follow-up is suggested  4. No other areas of abnormal hypermetabolic activity.      RECENT LABS:  Lab Results   Component Value Date    WBC 7.48 07/19/2021    HGB 16.4 07/19/2021    HCT 45.6 07/19/2021    MCV 86.0 07/19/2021    RDW 12.3 07/19/2021     07/19/2021    NEUTRORELPCT 61.4 07/19/2021    LYMPHORELPCT 23.9 07/19/2021    MONORELPCT 8.2 07/19/2021    EOSRELPCT 5.2 07/19/2021    BASORELPCT 1.2 07/19/2021    NEUTROABS 4.59 07/19/2021    LYMPHSABS 1.79 07/19/2021       Lab Results   Component Value Date     07/19/2021    K 3.5 07/19/2021    CO2 25.8 07/19/2021    CL 99 07/19/2021    BUN 15 07/19/2021    CREATININE 1.59 (H) 07/19/2021    EGFRIFNONA 43 (L) 07/19/2021    GLUCOSE 122 (H) 07/19/2021    CALCIUM 9.8 07/19/2021    ALKPHOS 63 07/19/2021    AST 24 07/19/2021    ALT 14 07/19/2021    BILITOT 0.5 07/19/2021    ALBUMIN 4.70 07/19/2021    PROTEINTOT 8.0 07/19/2021     Lab Results   Component Value Date    FERRITIN 823.40 (H) 07/19/2021    IRON 79 07/19/2021    TIBC 305 07/19/2021    LABIRON 26 07/19/2021    AGIIUJXQ88 437 07/19/2021    FOLATE 9.52 07/19/2021       Lab Results   Component Value Date/Time     (H) 07/19/2021 03:43 PM    URICACID 8.0 (H) 07/19/2021 03:43 PM         ASSESSMENT & PLAN:  Alycia De La Rosa is a very pleasant 71 y.o. 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    1.  Small Cell Carcinoma of the Bladder:  -  Appeaars to have localized disease.   -  Clinically has [id number] disease with involvement of the R pelvic sidewall.    -  Given limited stage disease, I have recommended treatment with Carboplatin/Etoposide followed by  either cystectomy or radiation.  Will plan to use Carboplatin over Cisplatin given solitary functional kidney.  -  PAC is in place..  -  Will repeat cBC, CMP, LDH, Uric acid today.  -  Will start Allopurinol 300 mg daily in anticipation of tumor lysis.    2.  Neoplasm related pain:  -  Currently taking Oxycodone 5 mg 1 tab q4h with uncontrolled pain.  -  Will start Morphine ER 15 mg q8h and encouraged him to liberalize use of Oxycodone to control pain.     -  Warned about constipation and recommended use  Of Senna/Colace ii BID with  Miralax as needed if he needs to take narcotics routinely.    3.  Prophlaxis:  He hasn't had Prevnar 13, 2020 influenza or COVID 19 vaccinations.  He says that with PCN allergy (anaphylaxis) he has been very hesitant to get vaccinations.  I explained that none of the vaccines contain PCN, but he continues to decline at this time.    4.  ACO / MERLY/Other  Quality measures  -  Alycia De La Rosa did not receive 2020 flu vaccine.  This was recommended but declined.  -  Alycia De La Rosa reports a pain score of 10.  Given his pain assessment as noted, treatment options were discussed and the following options were decided upon as a follow-up plan to address the patient's pain: prescription for opiod analgesics.  -  Current outpatient and discharge medications have been reconciled for the patient.  Reviewed by:       5.  Follow up:   - Will check labwork today including CBCD, CMP, LDH, uric acid.   - EMLA cream for port   - will start Allopurinol 300 mg daily   - will place orders for chemotherapy and schedule chemotherapy teaching appointment.   -  Return to see me 1 week after starting treatment with CBCD, CMP, LDH, Uric acid    This note was scribed for Jade Mathis MD by Ana Harper RN.    I, Jade Mathis MD, personally performed the services described in this documentation as scribed by the above named individual in my presence, and it is both accurate and complete.  08/10/2021      I spent 30 minutes with Alycia De La Rosa today.  In the office today, more than 50% of this time was spent face-to-face with him  in counseling / coordination of care, reviewing his medical history and counseling on the current treatment plan.  All questions were answered to his satisfaction      Electronically Signed by: Jade Mathis MD      CC:     MD Xavier Garza MD Barbara Michna, MD

## 2021-08-10 NOTE — PROGRESS NOTES
SS received referral message per provider:     Referral  Received: Today  Jade Mathis MD Taylor, Pamela F, MSW    Ambulatory Referral to ONC Social Work [[id number]]    Electronically signed by: Jade Mathis MD on 08/10/21 1029 Status: Active   Ordering user: Jade Mathis MD 08/10/21 1029 Ordering provider: Jade Mathis MD   Authorized by: Jade Mathis MD   Frequency:  08/10/21 -     Diagnoses   Small cell carcinoma of bladder (CMS/HCC) [C67.9]     SS will follow as needed for resources and support.

## 2021-08-16 ENCOUNTER — OFFICE VISIT (OUTPATIENT)
Dept: ONCOLOGY | Facility: CLINIC | Age: 71
End: 2021-08-16

## 2021-08-16 VITALS
SYSTOLIC BLOOD PRESSURE: 125 MMHG | OXYGEN SATURATION: 95 % | DIASTOLIC BLOOD PRESSURE: 75 MMHG | HEIGHT: 67 IN | RESPIRATION RATE: 18 BRPM | HEART RATE: 70 BPM | BODY MASS INDEX: 24.17 KG/M2 | TEMPERATURE: 97.3 F | WEIGHT: 154 LBS

## 2021-08-16 DIAGNOSIS — C67.9 SMALL CELL CARCINOMA OF BLADDER (HCC): Primary | ICD-10-CM

## 2021-08-16 PROCEDURE — 99215 OFFICE O/P EST HI 40 MIN: CPT | Performed by: NURSE PRACTITIONER

## 2021-08-16 RX ORDER — OLANZAPINE 5 MG/1
TABLET ORAL
Qty: 3 TABLET | Refills: 5 | Status: SHIPPED | OUTPATIENT
Start: 2021-08-16

## 2021-08-16 RX ORDER — ONDANSETRON HYDROCHLORIDE 8 MG/1
8 TABLET, FILM COATED ORAL EVERY 8 HOURS PRN
Qty: 30 TABLET | Refills: 1 | Status: SHIPPED | OUTPATIENT
Start: 2021-08-16

## 2021-08-16 RX ORDER — PROCHLORPERAZINE MALEATE 10 MG
10 TABLET ORAL EVERY 6 HOURS PRN
Qty: 60 TABLET | Refills: 1 | Status: SHIPPED | OUTPATIENT
Start: 2021-08-16

## 2021-08-16 NOTE — PROGRESS NOTES
Oncology Nutrition Screening    Patient Name:  Alycia De L aRosa  YOB: 1950  MRN: [id number]  Date:  08/16/21  Physician:  Dr. Mathis     Type of Cancer Treatment:   Small cell bladder cancer / chemo education    Labs and Medications:  Reviewed    Weight:   Height: 67 in  Weight: 154 lbs,  lbs, 104% IBW.  BMI: 24.12  Normal  Weight has been stable    Estimated Nutrition Needs:  7191-4026 kcal (25-30 kcal /kg)  70-84 gm protein (1-1.2 gm /kg)  8008-3220 ml fluid (1 ml / kcal)    Oral Food Intake:  Regular Diet - No Restrictions    Hydration Status:   Educated on drinking eight 8 ounce glasses of water per day. Patient stated he drinks water really well.      Nutrition Symptoms:   No Problems with Eating    Activity:   Normal with no limitations    Evaluation of Nutritional Risk:   Patient is not identified as a nutritional risk at time of screening; will follow patient through course of treatment for nutritional consultation as warranted.    PES Statement:  Nutrition Appropriate per condition r/t bladder cancer AEB good appetite.    Nutrition Goal / s:  Maintain Nutrition    Interventions:  Patient was provided with 1 case of Boost plus for home use.     Education:  Patient and his daughter were educated on importance of nutrition and cancer treatment.  Provided written information and reviewed on high calorie / high protein hints, meal and snack ideas, and educated on symptom management of nutrition related to chemo.  RD name and phone number provided.         Electronically signed by:  Rachel Tyler RD  11:11 EDT

## 2021-08-16 NOTE — PROGRESS NOTES
CHEMOTHERAPY PREPARATION    Alycia De La Rosa  [id number]  1950    Chief Complaint: chemo education     History of present illness:  Alycia De La Rosa is a 71 y.o. year old male who is here today for chemotherapy preparation and needs assessment. The patient has been diagnosed with small cell bladder cancer, limited stage and is scheduled to begin treatment with carboplatin/etoposide on August 18, 2021. At present, he reports doing well and denies any specific complaints.    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       Past Surgical History:   Procedure Laterality Date   • CYSTOSCOPY RETROGRADE PYELOGRAM Right 3/17/2021    Procedure: CYSTOSCOPY RETROGRADE PYELOGRAM;  Surgeon: Dennis Link MD;  Location: [facility];  Service: Urology;  Laterality: Right;   • CYSTOSCOPY URETEROSCOPY Bilateral 3/17/2021    Procedure: CYSTOSCOPY TRANSURETHRAL RESECTION OF BLADDER TUMOR;  Surgeon: Dennis Link MD;  Location: [facility];  Service: Urology;  Laterality: Bilateral;   • HYDROCELE EXCISION / REPAIR      x2   • VENOUS ACCESS DEVICE (PORT) INSERTION Left 8/4/2021    Procedure: INSERTION VENOUS ACCESS DEVICE;  Surgeon: Katy Cool MD;  Location: [facility];  Service: General;  Laterality: Left;       Family History   Problem Relation Age of Onset   • Cancer Father 84        prostate   • Cancer Mother 73        mouth   • Cancer Maternal Aunt         mouth   • Cancer Maternal  Grandfather        Medications: The current medication list was reviewed and reconciled.     Allergies:  is allergic to lisinopril and penicillins.    Review of Systems:  A comprehensive 14 point review of systems was performed. Significant findings as mentioned above. All other systems reviewed and are negative.    Physical Exam:    Vitals:    08/16/21 1011   BP: 125/75   Pulse: 70   Resp: 18   Temp: 97.3 °F (36.3 °C)   SpO2: 95%     Vitals:    08/16/21 1011   PainSc:   8   PainLoc: Buttocks       ECOG: (1) Restricted in Physically Strenuous Activity, Ambulatory & Able to Do Work of Light Nature  General: Well developed, well nourished, white male in no acute distress.  HEENT: Pupils equally round and reactive to light. Extraocular muscles intact.  Cardiovascular: Regular rate and rhythm. No murmurs, rubs or gallops.  Lungs: Clear to auscultation bilaterally.  Abdomen: Soft, nontender, nondistended. Bowel sounds normoactive all quadrants. No palpable organomegaly.  Extremities: No clubbing, cyanosis or edema.  Skin: Warm, dry, intact.  Neuro: Grossly non-focal exam.  Psych: Alert and oriented x 3.            NEEDS ASSESSMENTS    Genetics  The patient's new diagnosis and family history have been reviewed for genetic counseling needs. A genetic referral is not recommended.     Barriers to care  A barriers form was also completed by the patient today. We discussed services offered by our facility to help him have adequate access to care. The patient was given the name and card for our Oncology Social Worker, Cyndi Wiggins. Based upon barriers assessment today, the patient will not require a follow-up call from the  to further discuss needs.     VAD Assessment  The patient and I discussed planned intervenous chemotherapy as well as other IV treatments that are often needed throughout the course of treatment. These may include, but are not limited to blood transfusions, antibiotics, and IV hydration. The  "vasculature does not appear to be adequate for multiple peripheral IVs throughout their treatment course. Discussed risks and benefits of VADs. The patient would like to pursue Port-A-Cath insertion prior to initiation of treatment. Port-A-Cath in place per Dr. Cool.    Advanced Care Planning  The patient and I discussed advanced care planning, \"Conversations that Matter\".   This service was offered, free of charge, for development of advance directives with a certified ACP facilitator.  The patient does not have an up-to-date advanced directive. This document is not on file with our office. The patient is not interested in an appointment with one of our facilitators to create or update their advanced directives.      Palliative Care  The patient and I discussed palliative care services. Palliative care is not the same as Hospice care. This is specialized medical care for people living with serious illness with the goal of improving quality of life for the patient and their family. Paula has partnered with [facility] to offer our patients outpatient palliative care early along with their treatment to assist in coordination of care, symptom management, pain management, and medical decision making.  Oncology criteria for palliative care referral is not met at this time. The patient is not interested in a palliative care consultation.     Additional Referral needs  none      CHEMOTHERAPY EDUCATION    Booklets Given: Chemotherapy and You [x]  Eating Hints [x]    Sexuality/Fertility Books []      Chemotherapy/Biotherapy Education Sheets: (list all that apply)  nausea management, acid reflux management, diarrhea management, Cancer resourse contacts information, skin and mouth care and vaccination information                                                                                                                                                                 Chemotherapy Regimen:   Treatment " Plans     Name Type Plan Dates Plan Provider         Active    OP Small Cell Bladder CARBOplatin AUC=5 / Etoposide ONCOLOGY TREATMENT  8/15/2021 - Present Jade Mathis MD                    TOPICS EDUCATION PROVIDED COMMENTS   ANEMIA:  role of RBC, cause, s/s, ways to manage, role of transfusion [x]    THROMBOCYTOPENIA:  role of platelet, cause, s/s, ways to prevent bleeding, things to avoid, when to seek help [x]    NEUTROPENIA:  role of WBC, cause, infection precautions, s/s of infection, when to call MD [x]    NUTRITION & APPETITE CHANGES:  importance of maintaining healthy diet & weight, ways to manage to improve intake, dietary consult, exercise regimen [x]    DIARRHEA:  causes, s/s of dehydration, ways to manage, dietary changes, when to call MD [x]    CONSTIPATION:  causes, ways to manage, dietary changes, when to call MD [x]    NAUSEA & VOMITING:  cause, use of antiemetics, dietary changes, when to call MD [x]    MOUTH SORES:  causes, oral care, ways to manage [x]    ALOPECIA:  cause, ways to manage, resources [x]    INFERTILITY & SEXUALITY:  causes, fertility preservation options, sexuality changes, ways to manage, importance of birth control [x]    NERVOUS SYSTEM CHANGES:  causes, s/s, neuropathies, cognitive changes, ways to manage [x]    PAIN:  causes, ways to manage [x]    SKIN & NAIL CHANGES:  cause, s/s, ways to manage [x]    ORGAN TOXICITIES:  cause, s/s, need for diagnostic tests, labs, when to notify MD [x]    SURVIVORSHIP:  distress, distress assessment, secondary malignancies, early/late effects, follow-up, social issues, social support [x]    HOME CARE:  use of spill kits, storing of PO chemo, how to manage bodily fluids [x]    MISCELLANEOUS:  drug interactions, administration, vesicant, et [x]        Assessment and Plan:    Diagnoses and all orders for this visit:    1. Small cell carcinoma of bladder (CMS/HCC) (Primary)    Other orders  -     ondansetron (ZOFRAN) 8 MG tablet; Take 1  tablet by mouth Every 8 (Eight) Hours As Needed for Nausea or Vomiting.  Dispense: 30 tablet; Refill: 1  -     prochlorperazine (COMPAZINE) 10 MG tablet; Take 1 tablet by mouth Every 6 (Six) Hours As Needed for Nausea or Vomiting.  Dispense: 60 tablet; Refill: 1  -     OLANZapine (zyPREXA) 5 MG tablet; Take by mouth on days 2, 3 and 4 after chemotherapy.  Dispense: 3 tablet; Refill: 5      The patient and I have reviewed their new cancer diagnosis and scheduled treatment plan. Needs assessment was completed including genetics, barriers to care, VAD evaluation, advanced care planning, and palliative care services. Referrals have been ordered as appropriate based upon our evaluation and patient desires.     Chemotherapy teaching was also completed today as documented above. Adequate time was given to answer all questions to his satisfaction. Patient and family are aware of their care team members and contact information if they have questions or problems throughout the treatment course. Needs assessments and education has been completed. The patient is adequately prepared to begin treatment as scheduled.     Reviewed with patient education regarding Compazine, Zyprexa and Zofran prescriptions sent to pharmacy.       I advised the patient that he can take Tylenol or Ibuprofen as needed for aches/pains related to cancer/treatment. I also advised patient he could use Senakot or Miralax as needed for constipation or Imodium as needed for diarrhea.       I reviewed with the patient the care team members. I also reviewed the option of the acute care visits provided through our oncology office for evaluation and management of symptoms related to treatment. Patient was provided with phone number to call during regular office hours [phone] press #1 and for treatment related questions call [phone] to speak to a nurse. If after hours or on the weekend call [phone] and ask to page the MD on call for [facility]  Oncology services.    I spent 60 minutes with Alycia De La Rosa today.  In the office today, more than 50% of this time was spent face-to-face with him  in counseling / coordination of care, reviewing his interim medical history and counseling on the current treatment plan.  All questions were answered to his satisfaction.           Electronically Signed by: BETITO Hui , August 16, 2021 10:42 EDT

## 2021-08-17 ENCOUNTER — DOCUMENTATION (OUTPATIENT)
Dept: ONCOLOGY | Facility: CLINIC | Age: 71
End: 2021-08-17

## 2021-08-18 ENCOUNTER — DOCUMENTATION (OUTPATIENT)
Dept: ONCOLOGY | Facility: CLINIC | Age: 71
End: 2021-08-18

## 2021-08-18 ENCOUNTER — INFUSION (OUTPATIENT)
Dept: ONCOLOGY | Facility: HOSPITAL | Age: 71
End: 2021-08-18

## 2021-08-18 VITALS
OXYGEN SATURATION: 97 % | HEART RATE: 66 BPM | TEMPERATURE: 97.7 F | DIASTOLIC BLOOD PRESSURE: 73 MMHG | BODY MASS INDEX: 24.4 KG/M2 | SYSTOLIC BLOOD PRESSURE: 119 MMHG | RESPIRATION RATE: 18 BRPM | WEIGHT: 155.8 LBS

## 2021-08-18 DIAGNOSIS — C67.9 SMALL CELL CARCINOMA OF BLADDER (HCC): Primary | ICD-10-CM

## 2021-08-18 DIAGNOSIS — C66.1 URETERAL CARCINOMA, RIGHT (HCC): ICD-10-CM

## 2021-08-18 LAB
ALBUMIN SERPL-MCNC: 4.02 G/DL (ref 3.5–5.2)
ALBUMIN/GLOB SERPL: 1.3 G/DL
ALP SERPL-CCNC: 71 U/L (ref 39–117)
ALT SERPL W P-5'-P-CCNC: <5 U/L (ref 1–41)
ANION GAP SERPL CALCULATED.3IONS-SCNC: 8.8 MMOL/L (ref 5–15)
AST SERPL-CCNC: 28 U/L (ref 1–40)
BASOPHILS # BLD AUTO: 0.07 10*3/MM3 (ref 0–0.2)
BASOPHILS NFR BLD AUTO: 0.9 % (ref 0–1.5)
BILIRUB SERPL-MCNC: 0.5 MG/DL (ref 0–1.2)
BUN SERPL-MCNC: 20 MG/DL (ref 8–23)
BUN/CREAT SERPL: 11.2 (ref 7–25)
CALCIUM SPEC-SCNC: 10.4 MG/DL (ref 8.6–10.5)
CHLORIDE SERPL-SCNC: 96 MMOL/L (ref 98–107)
CO2 SERPL-SCNC: 31.2 MMOL/L (ref 22–29)
CREAT SERPL-MCNC: 1.78 MG/DL (ref 0.76–1.27)
DEPRECATED RDW RBC AUTO: 36.4 FL (ref 37–54)
EOSINOPHIL # BLD AUTO: 0.29 10*3/MM3 (ref 0–0.4)
EOSINOPHIL NFR BLD AUTO: 3.7 % (ref 0.3–6.2)
ERYTHROCYTE [DISTWIDTH] IN BLOOD BY AUTOMATED COUNT: 11.9 % (ref 12.3–15.4)
GFR SERPL CREATININE-BSD FRML MDRD: 38 ML/MIN/1.73
GLOBULIN UR ELPH-MCNC: 3 GM/DL
GLUCOSE SERPL-MCNC: 117 MG/DL (ref 65–99)
HCT VFR BLD AUTO: 37.8 % (ref 37.5–51)
HGB BLD-MCNC: 13.9 G/DL (ref 13–17.7)
IMM GRANULOCYTES # BLD AUTO: 0.02 10*3/MM3 (ref 0–0.05)
IMM GRANULOCYTES NFR BLD AUTO: 0.3 % (ref 0–0.5)
LYMPHOCYTES # BLD AUTO: 1.05 10*3/MM3 (ref 0.7–3.1)
LYMPHOCYTES NFR BLD AUTO: 13.3 % (ref 19.6–45.3)
MCH RBC QN AUTO: 31.2 PG (ref 26.6–33)
MCHC RBC AUTO-ENTMCNC: 36.8 G/DL (ref 31.5–35.7)
MCV RBC AUTO: 84.9 FL (ref 79–97)
MONOCYTES # BLD AUTO: 0.75 10*3/MM3 (ref 0.1–0.9)
MONOCYTES NFR BLD AUTO: 9.5 % (ref 5–12)
NEUTROPHILS NFR BLD AUTO: 5.73 10*3/MM3 (ref 1.7–7)
NEUTROPHILS NFR BLD AUTO: 72.3 % (ref 42.7–76)
NRBC BLD AUTO-RTO: 0 /100 WBC (ref 0–0.2)
PLATELET # BLD AUTO: 234 10*3/MM3 (ref 140–450)
PMV BLD AUTO: 9.6 FL (ref 6–12)
POTASSIUM SERPL-SCNC: 3 MMOL/L (ref 3.5–5.2)
PROT SERPL-MCNC: 7 G/DL (ref 6–8.5)
RBC # BLD AUTO: 4.45 10*6/MM3 (ref 4.14–5.8)
SODIUM SERPL-SCNC: 136 MMOL/L (ref 136–145)
WBC # BLD AUTO: 7.91 10*3/MM3 (ref 3.4–10.8)

## 2021-08-18 PROCEDURE — 25010000002 ETOPOSIDE 500 MG/25ML SOLUTION 25 ML VIAL: Performed by: INTERNAL MEDICINE

## 2021-08-18 PROCEDURE — 96413 CHEMO IV INFUSION 1 HR: CPT

## 2021-08-18 PROCEDURE — 25010000002 DEXAMETHASONE SODIUM PHOSPHATE 20 MG/5ML SOLUTION: Performed by: INTERNAL MEDICINE

## 2021-08-18 PROCEDURE — 96375 TX/PRO/DX INJ NEW DRUG ADDON: CPT

## 2021-08-18 PROCEDURE — 25010000002 HEPARIN LOCK FLUSH PER 10 UNITS: Performed by: INTERNAL MEDICINE

## 2021-08-18 PROCEDURE — 85025 COMPLETE CBC W/AUTO DIFF WBC: CPT

## 2021-08-18 PROCEDURE — 25010000002 FOSAPREPITANT PER 1 MG: Performed by: INTERNAL MEDICINE

## 2021-08-18 PROCEDURE — 80053 COMPREHEN METABOLIC PANEL: CPT

## 2021-08-18 PROCEDURE — 96367 TX/PROPH/DG ADDL SEQ IV INF: CPT

## 2021-08-18 PROCEDURE — 25010000002 CARBOPLATIN PER 50 MG: Performed by: INTERNAL MEDICINE

## 2021-08-18 PROCEDURE — 96417 CHEMO IV INFUS EACH ADDL SEQ: CPT

## 2021-08-18 PROCEDURE — 25010000002 PALONOSETRON PER 25 MCG: Performed by: INTERNAL MEDICINE

## 2021-08-18 RX ORDER — HEPARIN SODIUM (PORCINE) LOCK FLUSH IV SOLN 100 UNIT/ML 100 UNIT/ML
500 SOLUTION INTRAVENOUS AS NEEDED
Status: DISCONTINUED | OUTPATIENT
Start: 2021-08-18 | End: 2021-08-18 | Stop reason: HOSPADM

## 2021-08-18 RX ORDER — SODIUM CHLORIDE 9 MG/ML
250 INJECTION, SOLUTION INTRAVENOUS ONCE
Status: COMPLETED | OUTPATIENT
Start: 2021-08-18 | End: 2021-08-18

## 2021-08-18 RX ORDER — SODIUM CHLORIDE 0.9 % (FLUSH) 0.9 %
10 SYRINGE (ML) INJECTION AS NEEDED
Status: CANCELLED | OUTPATIENT
Start: 2021-08-18

## 2021-08-18 RX ORDER — HEPARIN SODIUM (PORCINE) LOCK FLUSH IV SOLN 100 UNIT/ML 100 UNIT/ML
500 SOLUTION INTRAVENOUS AS NEEDED
Status: CANCELLED | OUTPATIENT
Start: 2021-08-18

## 2021-08-18 RX ORDER — ONDANSETRON HYDROCHLORIDE 8 MG/1
8 TABLET, FILM COATED ORAL 3 TIMES DAILY PRN
Qty: 30 TABLET | Refills: 5 | Status: SHIPPED | OUTPATIENT
Start: 2021-08-18

## 2021-08-18 RX ORDER — OLANZAPINE 5 MG/1
5 TABLET ORAL ONCE
Status: COMPLETED | OUTPATIENT
Start: 2021-08-18 | End: 2021-08-18

## 2021-08-18 RX ORDER — SODIUM CHLORIDE 0.9 % (FLUSH) 0.9 %
10 SYRINGE (ML) INJECTION AS NEEDED
Status: DISCONTINUED | OUTPATIENT
Start: 2021-08-18 | End: 2021-08-18 | Stop reason: HOSPADM

## 2021-08-18 RX ORDER — PALONOSETRON 0.05 MG/ML
0.25 INJECTION, SOLUTION INTRAVENOUS ONCE
Status: COMPLETED | OUTPATIENT
Start: 2021-08-18 | End: 2021-08-18

## 2021-08-18 RX ORDER — OLANZAPINE 5 MG/1
5 TABLET ORAL NIGHTLY
Qty: 3 TABLET | Refills: 5 | Status: SHIPPED | OUTPATIENT
Start: 2021-08-18 | End: 2022-08-15

## 2021-08-18 RX ORDER — POTASSIUM CHLORIDE 20 MEQ/1
40 TABLET, EXTENDED RELEASE ORAL ONCE
Status: COMPLETED | OUTPATIENT
Start: 2021-08-18 | End: 2021-08-18

## 2021-08-18 RX ORDER — POTASSIUM CHLORIDE 750 MG/1
20 TABLET, EXTENDED RELEASE ORAL 2 TIMES DAILY
Qty: 8 TABLET | Refills: 0 | Status: SHIPPED | OUTPATIENT
Start: 2021-08-18 | End: 2021-08-20

## 2021-08-18 RX ADMIN — Medication 500 UNITS: at 12:09

## 2021-08-18 RX ADMIN — SODIUM CHLORIDE 250 ML: 9 INJECTION, SOLUTION INTRAVENOUS at 09:25

## 2021-08-18 RX ADMIN — DEXAMETHASONE SODIUM PHOSPHATE 12 MG: 4 INJECTION, SOLUTION INTRAMUSCULAR; INTRAVENOUS at 09:25

## 2021-08-18 RX ADMIN — PALONOSETRON HYDROCHLORIDE 0.25 MG: 0.25 INJECTION INTRAVENOUS at 09:25

## 2021-08-18 RX ADMIN — POTASSIUM CHLORIDE 40 MEQ: 20 TABLET, EXTENDED RELEASE ORAL at 10:00

## 2021-08-18 RX ADMIN — ETOPOSIDE 180 MG: 20 INJECTION INTRAVENOUS at 10:16

## 2021-08-18 RX ADMIN — OLANZAPINE 5 MG: 5 TABLET, FILM COATED ORAL at 09:25

## 2021-08-18 RX ADMIN — CARBOPLATIN 310 MG: 10 INJECTION, SOLUTION INTRAVENOUS at 11:20

## 2021-08-18 RX ADMIN — SODIUM CHLORIDE 150 MG: 9 INJECTION, SOLUTION INTRAVENOUS at 09:40

## 2021-08-18 NOTE — PROGRESS NOTES
" Consult Note    SS initiated social service assessment with patient and patient's daughter Autumn Castaneda.  Patient is 71 y.o. male diagnosed with Small Cell Carcinoma of Bladder.  Patient lives alone.  Patient's daughter, Autumn, lives about four miles from patient and provides assistance with patient's care as needed.    Home Health: Patient doesn't utilize any home health.    Primary Care Provider: Alec Soto MD     Patient has bedside commode and standard cane per unknown DME company.    Transportation:  provided by daughter.    Patient has 3 children, two daughter's Autumn and Demetria and one son Jr Alycia.     Patient completed NCCN Distress Thermometer his  6 out of 10.    Patient denies anxiety or depression.  Patient declines counseling appointment.    Advance Care Planning:  The patient and I discussed care planning \"Conversations that Matter.\" This service was offered, free of charge, for development of advance directives with a certified ACP facilitator. The patient does not have an up-to-date advance directive. The patient is not interested in an appointment with one of our facilitators to create or update their advanced directives.    Power of : Patient doesn't have a power of .    Resources:  SS completed application for [facility] and faxed to agency.  Copy to be scanned into patient's chart.    Patient provided with financial counselor information.  SS provided patient and daughter with social workers phone number to contact during normal business hours with any questions or concerns.    MELANY Rosenthal   "

## 2021-08-18 NOTE — PROGRESS NOTES
SS contacted patient's daughter Autumn Le [phone] to arrange appointment for patient to be here at 8:00 in a.m. to meet with  regarding new psycho social assessment prior to starting new chemotherapy.    Patient to be here in a.m. at 8:00 to meet with .    SS will follow.

## 2021-08-19 ENCOUNTER — INFUSION (OUTPATIENT)
Dept: ONCOLOGY | Facility: HOSPITAL | Age: 71
End: 2021-08-19

## 2021-08-19 VITALS
RESPIRATION RATE: 18 BRPM | DIASTOLIC BLOOD PRESSURE: 64 MMHG | TEMPERATURE: 97.3 F | HEART RATE: 73 BPM | OXYGEN SATURATION: 98 % | WEIGHT: 159.7 LBS | SYSTOLIC BLOOD PRESSURE: 121 MMHG | BODY MASS INDEX: 25.01 KG/M2

## 2021-08-19 DIAGNOSIS — C67.9 SMALL CELL CARCINOMA OF BLADDER (HCC): Primary | ICD-10-CM

## 2021-08-19 DIAGNOSIS — C66.1 URETERAL CARCINOMA, RIGHT (HCC): ICD-10-CM

## 2021-08-19 PROCEDURE — 96413 CHEMO IV INFUSION 1 HR: CPT

## 2021-08-19 PROCEDURE — 25010000002 ETOPOSIDE 500 MG/25ML SOLUTION 25 ML VIAL: Performed by: INTERNAL MEDICINE

## 2021-08-19 PROCEDURE — 96375 TX/PRO/DX INJ NEW DRUG ADDON: CPT

## 2021-08-19 PROCEDURE — 25010000002 HEPARIN LOCK FLUSH PER 10 UNITS: Performed by: INTERNAL MEDICINE

## 2021-08-19 PROCEDURE — 25010000002 DEXAMETHASONE SODIUM PHOSPHATE 20 MG/5ML SOLUTION: Performed by: INTERNAL MEDICINE

## 2021-08-19 RX ORDER — SODIUM CHLORIDE 9 MG/ML
250 INJECTION, SOLUTION INTRAVENOUS ONCE
Status: COMPLETED | OUTPATIENT
Start: 2021-08-19 | End: 2021-08-19

## 2021-08-19 RX ORDER — HEPARIN SODIUM (PORCINE) LOCK FLUSH IV SOLN 100 UNIT/ML 100 UNIT/ML
500 SOLUTION INTRAVENOUS AS NEEDED
Status: CANCELLED | OUTPATIENT
Start: 2021-08-19

## 2021-08-19 RX ORDER — SODIUM CHLORIDE 0.9 % (FLUSH) 0.9 %
10 SYRINGE (ML) INJECTION AS NEEDED
Status: CANCELLED | OUTPATIENT
Start: 2021-08-19

## 2021-08-19 RX ORDER — SODIUM CHLORIDE 0.9 % (FLUSH) 0.9 %
10 SYRINGE (ML) INJECTION AS NEEDED
Status: DISCONTINUED | OUTPATIENT
Start: 2021-08-19 | End: 2021-08-19 | Stop reason: HOSPADM

## 2021-08-19 RX ORDER — HEPARIN SODIUM (PORCINE) LOCK FLUSH IV SOLN 100 UNIT/ML 100 UNIT/ML
500 SOLUTION INTRAVENOUS AS NEEDED
Status: DISCONTINUED | OUTPATIENT
Start: 2021-08-19 | End: 2021-08-19 | Stop reason: HOSPADM

## 2021-08-19 RX ADMIN — ETOPOSIDE 180 MG: 20 INJECTION, SOLUTION, CONCENTRATE INTRAVENOUS at 09:17

## 2021-08-19 RX ADMIN — SODIUM CHLORIDE, PRESERVATIVE FREE 500 UNITS: 5 INJECTION INTRAVENOUS at 10:31

## 2021-08-19 RX ADMIN — DEXAMETHASONE SODIUM PHOSPHATE 12 MG: 4 INJECTION, SOLUTION INTRAMUSCULAR; INTRAVENOUS at 08:40

## 2021-08-19 RX ADMIN — SODIUM CHLORIDE 250 ML: 9 INJECTION, SOLUTION INTRAVENOUS at 08:40

## 2021-08-20 ENCOUNTER — INFUSION (OUTPATIENT)
Dept: ONCOLOGY | Facility: HOSPITAL | Age: 71
End: 2021-08-20

## 2021-08-20 VITALS
DIASTOLIC BLOOD PRESSURE: 64 MMHG | SYSTOLIC BLOOD PRESSURE: 115 MMHG | BODY MASS INDEX: 25.33 KG/M2 | RESPIRATION RATE: 18 BRPM | TEMPERATURE: 97.1 F | WEIGHT: 161.7 LBS | OXYGEN SATURATION: 98 % | HEART RATE: 65 BPM

## 2021-08-20 DIAGNOSIS — C67.9 SMALL CELL CARCINOMA OF BLADDER (HCC): Primary | ICD-10-CM

## 2021-08-20 DIAGNOSIS — T45.1X5A CHEMOTHERAPY INDUCED NEUTROPENIA (HCC): ICD-10-CM

## 2021-08-20 DIAGNOSIS — D70.1 CHEMOTHERAPY INDUCED NEUTROPENIA (HCC): ICD-10-CM

## 2021-08-20 DIAGNOSIS — C66.1 URETERAL CARCINOMA, RIGHT (HCC): ICD-10-CM

## 2021-08-20 PROCEDURE — 25010000002 DEXAMETHASONE SODIUM PHOSPHATE 20 MG/5ML SOLUTION: Performed by: INTERNAL MEDICINE

## 2021-08-20 PROCEDURE — 96375 TX/PRO/DX INJ NEW DRUG ADDON: CPT

## 2021-08-20 PROCEDURE — 25010000002 ETOPOSIDE 500 MG/25ML SOLUTION 25 ML VIAL: Performed by: INTERNAL MEDICINE

## 2021-08-20 PROCEDURE — 25010000002 PEGFILGRASTIM 6 MG/0.6ML PREFILLED SYRINGE KIT: Performed by: NURSE PRACTITIONER

## 2021-08-20 PROCEDURE — 25010000002 HEPARIN LOCK FLUSH PER 10 UNITS: Performed by: INTERNAL MEDICINE

## 2021-08-20 PROCEDURE — 96377 APPLICATON ON-BODY INJECTOR: CPT

## 2021-08-20 PROCEDURE — 96413 CHEMO IV INFUSION 1 HR: CPT

## 2021-08-20 RX ORDER — HEPARIN SODIUM (PORCINE) LOCK FLUSH IV SOLN 100 UNIT/ML 100 UNIT/ML
500 SOLUTION INTRAVENOUS AS NEEDED
Status: CANCELLED | OUTPATIENT
Start: 2021-09-08

## 2021-08-20 RX ORDER — HEPARIN SODIUM (PORCINE) LOCK FLUSH IV SOLN 100 UNIT/ML 100 UNIT/ML
500 SOLUTION INTRAVENOUS AS NEEDED
Status: DISCONTINUED | OUTPATIENT
Start: 2021-08-20 | End: 2021-08-20 | Stop reason: HOSPADM

## 2021-08-20 RX ORDER — SODIUM CHLORIDE 9 MG/ML
250 INJECTION, SOLUTION INTRAVENOUS ONCE
Status: COMPLETED | OUTPATIENT
Start: 2021-08-20 | End: 2021-08-20

## 2021-08-20 RX ORDER — SODIUM CHLORIDE 0.9 % (FLUSH) 0.9 %
10 SYRINGE (ML) INJECTION AS NEEDED
Status: DISCONTINUED | OUTPATIENT
Start: 2021-08-20 | End: 2021-08-20 | Stop reason: HOSPADM

## 2021-08-20 RX ORDER — SODIUM CHLORIDE 0.9 % (FLUSH) 0.9 %
10 SYRINGE (ML) INJECTION AS NEEDED
Status: CANCELLED | OUTPATIENT
Start: 2021-09-08

## 2021-08-20 RX ORDER — CHLORPROMAZINE HYDROCHLORIDE 25 MG/1
25 TABLET, FILM COATED ORAL 3 TIMES DAILY PRN
Qty: 60 TABLET | Refills: 0 | Status: SHIPPED | OUTPATIENT
Start: 2021-08-20 | End: 2021-10-01

## 2021-08-20 RX ADMIN — Medication 500 UNITS: at 12:43

## 2021-08-20 RX ADMIN — PEGFILGRASTIM 6 MG: KIT SUBCUTANEOUS at 12:40

## 2021-08-20 RX ADMIN — DEXAMETHASONE SODIUM PHOSPHATE 12 MG: 4 INJECTION, SOLUTION INTRAMUSCULAR; INTRAVENOUS at 10:53

## 2021-08-20 RX ADMIN — ETOPOSIDE 180 MG: 20 INJECTION, SOLUTION, CONCENTRATE INTRAVENOUS at 11:25

## 2021-08-20 RX ADMIN — SODIUM CHLORIDE, PRESERVATIVE FREE 10 ML: 5 INJECTION INTRAVENOUS at 12:43

## 2021-08-20 RX ADMIN — SODIUM CHLORIDE 250 ML: 9 INJECTION, SOLUTION INTRAVENOUS at 10:51

## 2021-08-26 ENCOUNTER — OFFICE VISIT (OUTPATIENT)
Dept: ONCOLOGY | Facility: CLINIC | Age: 71
End: 2021-08-26

## 2021-08-26 ENCOUNTER — LAB (OUTPATIENT)
Dept: ONCOLOGY | Facility: CLINIC | Age: 71
End: 2021-08-26

## 2021-08-26 VITALS
RESPIRATION RATE: 18 BRPM | WEIGHT: 150 LBS | DIASTOLIC BLOOD PRESSURE: 69 MMHG | HEART RATE: 62 BPM | SYSTOLIC BLOOD PRESSURE: 123 MMHG | OXYGEN SATURATION: 97 % | TEMPERATURE: 97.3 F | HEIGHT: 67 IN | BODY MASS INDEX: 23.54 KG/M2

## 2021-08-26 DIAGNOSIS — D70.1 CHEMOTHERAPY INDUCED NEUTROPENIA (HCC): ICD-10-CM

## 2021-08-26 DIAGNOSIS — C67.9 SMALL CELL CARCINOMA OF BLADDER (HCC): ICD-10-CM

## 2021-08-26 DIAGNOSIS — C66.1 URETERAL CARCINOMA, RIGHT (HCC): ICD-10-CM

## 2021-08-26 DIAGNOSIS — C67.9 SMALL CELL CARCINOMA OF BLADDER (HCC): Primary | ICD-10-CM

## 2021-08-26 DIAGNOSIS — N13.9 OBSTRUCTIVE UROPATHY: ICD-10-CM

## 2021-08-26 DIAGNOSIS — N18.30 STAGE 3 CHRONIC KIDNEY DISEASE, UNSPECIFIED WHETHER STAGE 3A OR 3B CKD (HCC): ICD-10-CM

## 2021-08-26 DIAGNOSIS — G89.3 NEOPLASM RELATED PAIN: ICD-10-CM

## 2021-08-26 DIAGNOSIS — T45.1X5A CHEMOTHERAPY INDUCED NEUTROPENIA (HCC): ICD-10-CM

## 2021-08-26 LAB
ALBUMIN SERPL-MCNC: 3.92 G/DL (ref 3.5–5.2)
ALBUMIN/GLOB SERPL: 1.5 G/DL
ALP SERPL-CCNC: 102 U/L (ref 39–117)
ALT SERPL W P-5'-P-CCNC: 12 U/L (ref 1–41)
ANION GAP SERPL CALCULATED.3IONS-SCNC: 8.5 MMOL/L (ref 5–15)
AST SERPL-CCNC: 15 U/L (ref 1–40)
BASOPHILS # BLD MANUAL: 0.03 10*3/MM3 (ref 0–0.2)
BASOPHILS NFR BLD AUTO: 1 % (ref 0–1.5)
BILIRUB SERPL-MCNC: 0.6 MG/DL (ref 0–1.2)
BUN SERPL-MCNC: 28 MG/DL (ref 8–23)
BUN/CREAT SERPL: 19 (ref 7–25)
CALCIUM SPEC-SCNC: 9 MG/DL (ref 8.6–10.5)
CHLORIDE SERPL-SCNC: 99 MMOL/L (ref 98–107)
CO2 SERPL-SCNC: 29.5 MMOL/L (ref 22–29)
CREAT SERPL-MCNC: 1.47 MG/DL (ref 0.76–1.27)
DEPRECATED RDW RBC AUTO: 37.2 FL (ref 37–54)
EOSINOPHIL # BLD MANUAL: 0.09 10*3/MM3 (ref 0–0.4)
EOSINOPHIL NFR BLD MANUAL: 3 % (ref 0.3–6.2)
ERYTHROCYTE [DISTWIDTH] IN BLOOD BY AUTOMATED COUNT: 11.7 % (ref 12.3–15.4)
GFR SERPL CREATININE-BSD FRML MDRD: 47 ML/MIN/1.73
GLOBULIN UR ELPH-MCNC: 2.7 GM/DL
GLUCOSE SERPL-MCNC: 101 MG/DL (ref 65–99)
HCT VFR BLD AUTO: 34.9 % (ref 37.5–51)
HGB BLD-MCNC: 12.3 G/DL (ref 13–17.7)
LDH SERPL-CCNC: 464 U/L (ref 135–225)
LYMPHOCYTES # BLD MANUAL: 0.89 10*3/MM3 (ref 0.7–3.1)
LYMPHOCYTES NFR BLD MANUAL: 31 % (ref 19.6–45.3)
LYMPHOCYTES NFR BLD MANUAL: 4 % (ref 5–12)
MCH RBC QN AUTO: 30.7 PG (ref 26.6–33)
MCHC RBC AUTO-ENTMCNC: 35.2 G/DL (ref 31.5–35.7)
MCV RBC AUTO: 87 FL (ref 79–97)
MONOCYTES # BLD AUTO: 0.11 10*3/MM3 (ref 0.1–0.9)
NEUTROPHILS # BLD AUTO: 1.74 10*3/MM3 (ref 1.7–7)
NEUTROPHILS NFR BLD MANUAL: 56 % (ref 42.7–76)
NEUTS BAND NFR BLD MANUAL: 5 % (ref 0–5)
PLATELET # BLD AUTO: 91 10*3/MM3 (ref 140–450)
PMV BLD AUTO: 9.4 FL (ref 6–12)
POTASSIUM SERPL-SCNC: 3.7 MMOL/L (ref 3.5–5.2)
PROT SERPL-MCNC: 6.6 G/DL (ref 6–8.5)
RBC # BLD AUTO: 4.01 10*6/MM3 (ref 4.14–5.8)
RBC MORPH BLD: NORMAL
SCAN SLIDE: NORMAL
SMALL PLATELETS BLD QL SMEAR: ABNORMAL
SODIUM SERPL-SCNC: 137 MMOL/L (ref 136–145)
URATE SERPL-MCNC: 5.3 MG/DL (ref 3.4–7)
WBC # BLD AUTO: 2.86 10*3/MM3 (ref 3.4–10.8)

## 2021-08-26 PROCEDURE — 99214 OFFICE O/P EST MOD 30 MIN: CPT | Performed by: INTERNAL MEDICINE

## 2021-08-26 PROCEDURE — 83615 LACTATE (LD) (LDH) ENZYME: CPT | Performed by: INTERNAL MEDICINE

## 2021-08-26 PROCEDURE — 80053 COMPREHEN METABOLIC PANEL: CPT | Performed by: INTERNAL MEDICINE

## 2021-08-26 PROCEDURE — 85007 BL SMEAR W/DIFF WBC COUNT: CPT | Performed by: INTERNAL MEDICINE

## 2021-08-26 PROCEDURE — 85025 COMPLETE CBC W/AUTO DIFF WBC: CPT | Performed by: INTERNAL MEDICINE

## 2021-08-26 PROCEDURE — 84550 ASSAY OF BLOOD/URIC ACID: CPT | Performed by: INTERNAL MEDICINE

## 2021-08-26 NOTE — PROGRESS NOTES
NAME: Alycia De La Rosa    : 1950    DATE OF CONSULTATION:  2021    DIAGNOSIS:   Small cell bladder cancer, Limited stage disease    TREATMENT HISTORY:  1.         CHIEF COMPLAINT:  Small Cell Bladder cancer      HISTORY OF PRESENT ILLNESS:   Alycia De La Rosa is a very pleasant 71 y.o. male who is being seen today at the request of Dr. Xavier Arnold for evaluation and treatment of small cell bladder cancer. He first developed gross hematuria in 2021 and was seen by his PCP Dr. Alec Soto.  He had imaging revealing hydronephrosis due to obstruction of the R ureteral orifice with non-functioning R kidney and was referred to Dr. Link who performed cystoscopy with biopsy on 3-17-21.  Cystoscopy revealed a normal urethra nd prostate with indented R ureteral orfice with obvious tumor behind it.  He performed transurethral resection until obvious ureter and sent tumor for pathologic examination.  This showed high grade urothelial carcinoma .  Muscularis propria was present but uninvolved.  Dr. Link referred him to Dr. Sylvester for consideration of R nephrectomy / ureterectomy.  Mr. De La Rosa was referred to Dr. Sylvester who performed repeat cystoscopy 21.  This revealed a large mass pushing into the bladder mucosa / external compression with mass in the R saad-trigone and R bladder wall.  R ureteral orifice could not be identified.  A 10x8 cm mass was resected.  PAthology showed small cell carcinoma with invasion of the muscularis propria as well as focal conventional high grade urothelial carcinoma.  Dr. Sylvester referred him to Dr. Xavier Arnold given small cell histology.  Dr. Lott recommended imaging for staging and treatment with Carbo/Etoposide/Atezolizumab. He was referred here because he preferred to receive treatment close to home.    Mr. De La Rosa presents today with his daughter (who works with Dr. Otoole).  He is overall doing well.  He has had 2 episodes of gross hematuria in total, but this hasn't been a  persistent problem.  He denies weight loss.  No chest pain or shortness of breath. He complains of RLQ pelvic pain which radiates into his R hip/ R upper thigh.  He has been using Tylenol for pain which he says is sufficient, but he is clearly in pain in the office today and is unable to sit in the chair because of pain.         INTERVAL HISTORY:   Mr. De La Rosa is here today for follow up of small cell bladder cancer. He has received 1 cycle of carboplatin/etoposide. He tolerated his treatment well. He reports mild weakness following his treatment and change in his tastebuds, but he has continued to eat and drink well, to hydrate and to be active.   He hip discomfort which he says has been mild and he has been using Ibuprofen and oxycodone. He reports  Diarrhea for 2 days following his treatment  that resolved with imodium.  He denies nausea and says he didn't take any nausea medication.  He continues on allopurinol.      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       PAST SURGICAL HISTORY:  Past Surgical History:   Procedure Laterality Date   • CYSTOSCOPY RETROGRADE PYELOGRAM Right 3/17/2021    Procedure: CYSTOSCOPY RETROGRADE PYELOGRAM;  Surgeon: Dennis Link MD;  Location: [facility];  Service: Urology;  Laterality: Right;   • CYSTOSCOPY URETEROSCOPY Bilateral 3/17/2021    Procedure: CYSTOSCOPY TRANSURETHRAL RESECTION OF BLADDER TUMOR;  Surgeon: Dennis Link MD;  Location: [facility];  Service: Urology;  Laterality: Bilateral;   • HYDROCELE EXCISION / REPAIR      x2   • VENOUS ACCESS DEVICE (PORT) INSERTION Left 8/4/2021    Procedure: INSERTION VENOUS ACCESS DEVICE;  Surgeon: Katy Cool MD;   Location: [facility];  Service: General;  Laterality: Left;       FAMILY HISTORY:  Family History   Problem Relation Age of Onset   • Cancer Father 84        prostate   • Cancer Mother 73        mouth   • Cancer Maternal Aunt         mouth   • Cancer Maternal Grandfather          SOCIAL HISTORY:  Social History     Socioeconomic History   • Marital status:      Spouse name: Not on file   • Number of children: Not on file   • Years of education: Not on file   • Highest education level: Not on file   Tobacco Use   • Smoking status: Former Smoker     Packs/day: 3.00     Years: 5.00     Pack years: 15.00     Types: Cigarettes     Quit date:      Years since quittin.6   • Smokeless tobacco: Former User   Vaping Use   • Vaping Use: Never used   Substance and Sexual Activity   • Alcohol use: Not Currently   • Drug use: Never   • Sexual activity: Defer     Social History     Social History Narrative    He used to work as a  and denies any unusual chemical exposures.        REVIEW OF SYSTEMS:   A comprehensive 14 point review of systems was performed.  Significant findings as mentioned above.  All other systems reviewed and are negative.      MEDICATIONS:  The current medication list was reviewed in the EMR    Current Outpatient Medications:   •  allopurinol (ZYLOPRIM) 300 MG tablet, Take 1 tablet by mouth Daily., Disp: 30 tablet, Rfl: 3  •  amLODIPine (NORVASC) 10 MG tablet, 10 mg., Disp: , Rfl:   •  chlorproMAZINE (THORAZINE) 25 MG tablet, Take 1 tablet by mouth 3 (Three) Times a Day As Needed (Hiccups)., Disp: 60 tablet, Rfl: 0  •  hydroCHLOROthiazide (HYDRODIURIL) 12.5 MG tablet, 12.5 mg., Disp: , Rfl:   •  HYDROcodone-acetaminophen (NORCO)  MG per tablet, Take 1 tablet by mouth Every 4 (Four) Hours As Needed for Moderate Pain, Disp: 12 tablet, Rfl: 0  •  HYDROcodone-acetaminophen (Norco) 7.5-325 MG per tablet, Take 1 tablet by mouth 4 (Four) Times a Day As Needed for Moderate Pain .,  Disp: 8 tablet, Rfl: 0  •  lidocaine-prilocaine (EMLA) 2.5-2.5 % cream, Apply generous amount to port site 30-45 minutes prior to use and cover with plastic wrap, Disp: 30 g, Rfl: 5  •  metoprolol tartrate (LOPRESSOR) 50 MG tablet, 50 mg., Disp: , Rfl:   •  Morphine (MS CONTIN) 15 MG 12 hr tablet, Take 1 tablet by mouth Every 8 (Eight) Hours., Disp: 90 tablet, Rfl: 0  •  OLANZapine (zyPREXA) 5 MG tablet, Take by mouth on days 2, 3 and 4 after chemotherapy., Disp: 3 tablet, Rfl: 5  •  OLANZapine (zyPREXA) 5 MG tablet, Take 1 tablet by mouth Every Night. Take on days 2, 3 and 4 after chemotherapy., Disp: 3 tablet, Rfl: 5  •  ondansetron (ZOFRAN) 8 MG tablet, Take 1 tablet by mouth Every 8 (Eight) Hours As Needed for Nausea or Vomiting., Disp: 30 tablet, Rfl: 1  •  ondansetron (ZOFRAN) 8 MG tablet, Take 1 tablet by mouth 3 (Three) Times a Day As Needed for Nausea or Vomiting., Disp: 30 tablet, Rfl: 5  •  oxyCODONE (ROXICODONE) 5 MG immediate release tablet, Take 1-2 tabs every 4-6 hours as needed for pain, Disp: 180 tablet, Rfl: 0  •  prochlorperazine (COMPAZINE) 10 MG tablet, Take 1 tablet by mouth Every 6 (Six) Hours As Needed for Nausea or Vomiting., Disp: 60 tablet, Rfl: 1  •  sennosides-docusate (Senna-S) 8.6-50 MG per tablet, Take 2 tablets by mouth 2 (Two) Times a Day., Disp: 120 tablet, Rfl: 5    ALLERGIES:    Allergies   Allergen Reactions   • Lisinopril Anaphylaxis and Rash   • Penicillins Anaphylaxis and Rash       PHYSICAL EXAM:  Vitals:    08/26/21 0925   BP: 123/69   Pulse: 62   Resp: 18   Temp: 97.3 °F (36.3 °C)   SpO2: 97%     Pain Score    08/26/21 0925   PainSc:   4   PainLoc: Buttocks     General:  Awake, alert and oriented, appears well, comfortable. In good spirits  HEENT:  Pupils are equal, round and reactive to light and accommodation, Extra-ocular movements full, Oropharyx clear, mucous membranes moist  Neck:  No JVD, thyromegaly or lymphadenopathy  CV:  Regular rate and rhythm, no murmurs, rubs  or gallops  Resp:  Lungs are clear to auscultation bilaterally, no crackles  Abd:  Soft, non-tender, non-distended, bowel sounds present, no organomegaly or masses  Ext:  No clubbing, cyanosis or edema  Lymph:  No cervical, supraclavicular, axillary, inguinal or femoral adenopathy  Neuro:  MS as above, CN II-XII intact, grossly non-focal exam      PATHOLOGY:  03-17-21 04-20-21 06-14-21          ENDOSCOPY:  Cystoscopy 03-17-21 ([facility]):  Findings: Probable right distal ureteral tumor with nonfunctioning kidney and massive dilation      Cystourethroscopy, TURP 06-14-21 ([facility])  Findings:   1.Left ureteral orifice identified. Retrograde pyelography without hydronephrosis or evidence of filling defect.   2. Large mass pushing into bladder mucosa/external compression- no obvious urothelial papillar lesion. Mass located in the right saad-trigone and right bladder wall. Inability to identify right ureteral orifice  3. Resection of mass with resection size of 10cm x 8 cm.   4. Hemostasis achieved at conclusion of case  5. Placement of 20 F 2 way catheter         IMAGING:  CT Chest 04-12-21  FINDINGS:    LUNGS:  Unremarkable.  No mass.  No consolidation.    PLEURAL SPACE:  Unremarkable.  No pneumothorax.  No significant  effusion.    HEART:  Unremarkable.  No cardiomegaly.  No significant pericardial  effusion.    MEDIASTINUM:  Moderate-sized hiatal hernia.    BONES/JOINTS:  Unremarkable.  No acute fracture.  No dislocation.    SOFT TISSUES:  Unremarkable.    VASCULATURE:  Unremarkable.  No thoracic aortic aneurysm.    LYMPH NODES:  Unremarkable.  No enlarged lymph nodes.    LIVER:  Left lobe of liver cyst measuring 3 cm.    KIDNEYS AND URETERS:  Incidentally imaged right upper kidney shows  moderate hydronephrosis and cortical thinning.     IMPRESSION:    Moderate-sized hiatal hernia.        PET/CT 08-06-21  FINDINGS:      HEAD/NECK:  No FDG hypermetabolic neck adenopathy.  No FDG hypermetabolic masses.      CHEST:   No FDG hypermetabolic thoracic adenopathy.  No FDG hypermetabolic lung nodules or masses.  Evaluation for tiny parenchymal nodules is somewhat limited on low dose  CT secondary to respiratory motion.     ABDOMEN/PELVIS:   The right kidney shows cortical thinning and the right collecting system  is diffusely dilated to the level of the urinary bladder where the wall  is thickened. The left kidney shows diffuse left-sided dilatation as  well.  The pelvis shows heterogeneous soft tissue mass which appears to be  intimate with the prostate. Significantly larger today than on the  previous CT scan.  Large right-sided hydrocele is present.  There is hypermetabolic activity throughout the dilated left ureter as  well as in the left renal pelvis. This likely represents excreted  radiotracer.  Extensive increased activity intermixed in the heterogeneous appearance  of the prostate.     BONES: There are scattered foci of FDG hypermetabolism most suggestive  of degenerative change seen throughout the axial skeleton. If concern  persist for metastatic lesions of the bone, HDP Bone scan is recommended  for further evaluation.     IMPRESSION:     1. Hydronephrosis bilaterally and hydroureter bilaterally to the level  of a thickened urinary bladder.  2. Diffuse increased activity throughout the left collecting system as  well as in the bladder.  3. Heterogeneous soft tissue mass in the pelvis intimate with the  prostate. The periphery does show hypermetabolic activity. Cannot  completely separate this activity from the bladder. Maximum SUV is  9.656. Urologic follow-up is suggested  4. No other areas of abnormal hypermetabolic activity.      RECENT LABS:  Lab Results   Component Value Date    WBC 2.86 (L) 08/26/2021    HGB 12.3 (L) 08/26/2021    HCT 34.9 (L) 08/26/2021    MCV 87.0 08/26/2021    RDW 11.7 (L) 08/26/2021    PLT 91 (L) 08/26/2021    NEUTRORELPCT 72.3 08/18/2021    LYMPHORELPCT 13.3 (L) 08/18/2021     MONORELPCT 9.5 08/18/2021    EOSRELPCT 3.7 08/18/2021    BASORELPCT 0.9 08/18/2021    NEUTROABS 1.74 08/26/2021    LYMPHSABS 1.05 08/18/2021       Lab Results   Component Value Date     08/26/2021    K 3.7 08/26/2021    CO2 29.5 (H) 08/26/2021    CL 99 08/26/2021    BUN 28 (H) 08/26/2021    CREATININE 1.47 (H) 08/26/2021    EGFRIFNONA 47 (L) 08/26/2021    GLUCOSE 101 (H) 08/26/2021    CALCIUM 9.0 08/26/2021    ALKPHOS 102 08/26/2021    AST 15 08/26/2021    ALT 12 08/26/2021    BILITOT 0.6 08/26/2021    ALBUMIN 3.92 08/26/2021    PROTEINTOT 6.6 08/26/2021     Lab Results   Component Value Date    FERRITIN 823.40 (H) 07/19/2021    IRON 79 07/19/2021    TIBC 305 07/19/2021    LABIRON 26 07/19/2021    AJCTLABX24 437 07/19/2021    FOLATE 9.52 07/19/2021       Lab Results   Component Value Date/Time     (H) 08/26/2021 09:23 AM    URICACID 5.3 08/26/2021 09:23 AM         ASSESSMENT & PLAN:  Alycia De La Rosa is a very pleasant 71 y.o. 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    1.  Small Cell Carcinoma of the Bladder:  -  Appeaars to have localized disease.   -  Clinically has [id number] disease with involvement of the R pelvic sidewall.    -  Given limited stage disease, recommended treatment with Carboplatin/Etoposide followed by either cystectomy or radiation.  Used Carboplatin over Cisplatin given solitary functional kidney.  -  He did exceptionally well with C1 treatment.  Encouraged him to continue to do what he is doing.  The only thing I recommended he do differently is I recommended he use narcotic pain medication when he hurts rather than Ibuprofen bc of solitary functional kidney.  -  CBC as expected. CMP shows improving renal function. Uric acid is wnl.  -  Continue Allopurinol 300 mg daily for one more cycle.    2.  Neoplasm related pain:  -  Currently taking Oxycodone 5 mg 1 tab  q4h with uncontrolled pain.  -  Ordered Morphine ER 15 mg q8h and encouraged him to liberalize use of Oxycodone to control pain.   -  Recommended against use of ibuprofen.    -  Warned about constipation and recommended use  Of Senna/Colace ii BID with  Miralax as needed if he needs to take narcotics routinely.    3.  Prophlaxis:  He hasn't had Prevnar 13, 2020 influenza or COVID 19 vaccinations.  He says that with PCN allergy (anaphylaxis) he has been very hesitant to get vaccinations.  I explained that none of the vaccines contain PCN.  We again discussed COVID vaccination, but he again declined.    4.  ACO / MERLY/Other  Quality measures  -  Alycia De La Rosa did not receive 2020 flu vaccine.  This was recommended but declined.  -  Alycia De La Rosa reports a pain score of 4.  Given his pain assessment as noted, treatment options were discussed and the following options were decided upon as a follow-up plan to address the patient's pain: prescription for opiod analgesics.  -  Current outpatient and discharge medications have been reconciled for the patient.  Reviewed by:       5.  Follow up:   - Proceed with C2 of carboplatin/etoposide on 9/8 as planned  - Continue Allopurinol   - APRN f/u with C2.  I will see him back with C3.    This note was scribed for Jade Mathis MD by Ana Harper RN.    I, Jade Mathis MD, personally performed the services described in this documentation as scribed by the above named individual in my presence, and it is both accurate and complete.  08/26/2021       I spent 30 minutes with Alycia De La Rosa today.  In the office today, more than 50% of this time was spent face-to-face with him  in counseling / coordination of care, reviewing his medical history and counseling on the current treatment plan.  All questions were answered to his satisfaction      Electronically Signed by: Jade Mathis MD      CC:     MD Xavier Garza MD Barbara Michna, MD

## 2021-09-05 DIAGNOSIS — T45.1X5A CHEMOTHERAPY INDUCED NEUTROPENIA (HCC): Primary | ICD-10-CM

## 2021-09-05 DIAGNOSIS — D70.1 CHEMOTHERAPY INDUCED NEUTROPENIA (HCC): Primary | ICD-10-CM

## 2021-09-05 DIAGNOSIS — C67.9 SMALL CELL CARCINOMA OF BLADDER (HCC): ICD-10-CM

## 2021-09-05 RX ORDER — SODIUM CHLORIDE 9 MG/ML
250 INJECTION, SOLUTION INTRAVENOUS ONCE
Status: CANCELLED | OUTPATIENT
Start: 2021-09-10

## 2021-09-05 RX ORDER — OLANZAPINE 5 MG/1
5 TABLET ORAL ONCE
Status: CANCELLED | OUTPATIENT
Start: 2021-09-08 | End: 2021-09-08

## 2021-09-05 RX ORDER — DIPHENHYDRAMINE HYDROCHLORIDE 50 MG/ML
50 INJECTION INTRAMUSCULAR; INTRAVENOUS AS NEEDED
Status: CANCELLED | OUTPATIENT
Start: 2021-09-08

## 2021-09-05 RX ORDER — PALONOSETRON 0.05 MG/ML
0.25 INJECTION, SOLUTION INTRAVENOUS ONCE
Status: CANCELLED | OUTPATIENT
Start: 2021-09-08

## 2021-09-05 RX ORDER — FAMOTIDINE 10 MG/ML
20 INJECTION, SOLUTION INTRAVENOUS AS NEEDED
Status: CANCELLED | OUTPATIENT
Start: 2021-09-08

## 2021-09-05 RX ORDER — SODIUM CHLORIDE 9 MG/ML
250 INJECTION, SOLUTION INTRAVENOUS ONCE
Status: CANCELLED | OUTPATIENT
Start: 2021-09-08

## 2021-09-05 RX ORDER — SODIUM CHLORIDE 9 MG/ML
250 INJECTION, SOLUTION INTRAVENOUS ONCE
Status: CANCELLED | OUTPATIENT
Start: 2021-09-09

## 2021-09-08 ENCOUNTER — INFUSION (OUTPATIENT)
Dept: ONCOLOGY | Facility: HOSPITAL | Age: 71
End: 2021-09-08

## 2021-09-08 ENCOUNTER — LAB (OUTPATIENT)
Dept: ONCOLOGY | Facility: CLINIC | Age: 71
End: 2021-09-08

## 2021-09-08 ENCOUNTER — OFFICE VISIT (OUTPATIENT)
Dept: ONCOLOGY | Facility: CLINIC | Age: 71
End: 2021-09-08

## 2021-09-08 VITALS
HEART RATE: 63 BPM | HEIGHT: 67 IN | OXYGEN SATURATION: 97 % | SYSTOLIC BLOOD PRESSURE: 113 MMHG | TEMPERATURE: 98.2 F | DIASTOLIC BLOOD PRESSURE: 64 MMHG | RESPIRATION RATE: 16 BRPM | WEIGHT: 149 LBS | BODY MASS INDEX: 23.39 KG/M2

## 2021-09-08 VITALS
TEMPERATURE: 98.2 F | RESPIRATION RATE: 16 BRPM | HEART RATE: 63 BPM | DIASTOLIC BLOOD PRESSURE: 64 MMHG | HEIGHT: 67 IN | BODY MASS INDEX: 23.39 KG/M2 | OXYGEN SATURATION: 97 % | SYSTOLIC BLOOD PRESSURE: 113 MMHG | WEIGHT: 149 LBS

## 2021-09-08 DIAGNOSIS — D70.1 CHEMOTHERAPY INDUCED NEUTROPENIA (HCC): Primary | ICD-10-CM

## 2021-09-08 DIAGNOSIS — T45.1X5A CHEMOTHERAPY INDUCED NEUTROPENIA (HCC): Primary | ICD-10-CM

## 2021-09-08 DIAGNOSIS — C67.9 SMALL CELL CARCINOMA OF BLADDER (HCC): Primary | ICD-10-CM

## 2021-09-08 DIAGNOSIS — C66.1 URETERAL CARCINOMA, RIGHT (HCC): ICD-10-CM

## 2021-09-08 DIAGNOSIS — D70.1 CHEMOTHERAPY INDUCED NEUTROPENIA (HCC): ICD-10-CM

## 2021-09-08 DIAGNOSIS — C67.9 SMALL CELL CARCINOMA OF BLADDER (HCC): ICD-10-CM

## 2021-09-08 DIAGNOSIS — T45.1X5A CHEMOTHERAPY INDUCED NEUTROPENIA (HCC): ICD-10-CM

## 2021-09-08 LAB
ALBUMIN SERPL-MCNC: 3.85 G/DL (ref 3.5–5.2)
ALBUMIN/GLOB SERPL: 1.4 G/DL
ALP SERPL-CCNC: 88 U/L (ref 39–117)
ALT SERPL W P-5'-P-CCNC: 12 U/L (ref 1–41)
ANION GAP SERPL CALCULATED.3IONS-SCNC: 9.3 MMOL/L (ref 5–15)
AST SERPL-CCNC: 19 U/L (ref 1–40)
BASOPHILS # BLD AUTO: 0.13 10*3/MM3 (ref 0–0.2)
BASOPHILS NFR BLD AUTO: 1.3 % (ref 0–1.5)
BILIRUB SERPL-MCNC: 0.4 MG/DL (ref 0–1.2)
BUN SERPL-MCNC: 12 MG/DL (ref 8–23)
BUN/CREAT SERPL: 8.2 (ref 7–25)
CALCIUM SPEC-SCNC: 9.5 MG/DL (ref 8.6–10.5)
CHLORIDE SERPL-SCNC: 98 MMOL/L (ref 98–107)
CO2 SERPL-SCNC: 30.7 MMOL/L (ref 22–29)
CREAT SERPL-MCNC: 1.47 MG/DL (ref 0.76–1.27)
DEPRECATED RDW RBC AUTO: 36.4 FL (ref 37–54)
EOSINOPHIL # BLD AUTO: 0.06 10*3/MM3 (ref 0–0.4)
EOSINOPHIL NFR BLD AUTO: 0.6 % (ref 0.3–6.2)
ERYTHROCYTE [DISTWIDTH] IN BLOOD BY AUTOMATED COUNT: 13.4 % (ref 12.3–15.4)
GFR SERPL CREATININE-BSD FRML MDRD: 47 ML/MIN/1.73
GLOBULIN UR ELPH-MCNC: 2.8 GM/DL
GLUCOSE SERPL-MCNC: 118 MG/DL (ref 65–99)
HCT VFR BLD AUTO: 33.6 % (ref 37.5–51)
HGB BLD-MCNC: 11.8 G/DL (ref 13–17.7)
IMM GRANULOCYTES # BLD AUTO: 0.18 10*3/MM3 (ref 0–0.05)
IMM GRANULOCYTES NFR BLD AUTO: 1.8 % (ref 0–0.5)
LYMPHOCYTES # BLD AUTO: 1.32 10*3/MM3 (ref 0.7–3.1)
LYMPHOCYTES NFR BLD AUTO: 13 % (ref 19.6–45.3)
MCH RBC QN AUTO: 30.9 PG (ref 26.6–33)
MCHC RBC AUTO-ENTMCNC: 35.1 G/DL (ref 31.5–35.7)
MCV RBC AUTO: 88 FL (ref 79–97)
MONOCYTES # BLD AUTO: 0.76 10*3/MM3 (ref 0.1–0.9)
MONOCYTES NFR BLD AUTO: 7.5 % (ref 5–12)
NEUTROPHILS NFR BLD AUTO: 7.73 10*3/MM3 (ref 1.7–7)
NEUTROPHILS NFR BLD AUTO: 75.8 % (ref 42.7–76)
NRBC BLD AUTO-RTO: 0 /100 WBC (ref 0–0.2)
PLATELET # BLD AUTO: 264 10*3/MM3 (ref 140–450)
PMV BLD AUTO: 9.2 FL (ref 6–12)
POTASSIUM SERPL-SCNC: 3.4 MMOL/L (ref 3.5–5.2)
PROT SERPL-MCNC: 6.6 G/DL (ref 6–8.5)
RBC # BLD AUTO: 3.82 10*6/MM3 (ref 4.14–5.8)
SODIUM SERPL-SCNC: 138 MMOL/L (ref 136–145)
WBC # BLD AUTO: 10.18 10*3/MM3 (ref 3.4–10.8)

## 2021-09-08 PROCEDURE — 99213 OFFICE O/P EST LOW 20 MIN: CPT | Performed by: NURSE PRACTITIONER

## 2021-09-08 PROCEDURE — 96417 CHEMO IV INFUS EACH ADDL SEQ: CPT

## 2021-09-08 PROCEDURE — 25010000002 CARBOPLATIN PER 50 MG: Performed by: INTERNAL MEDICINE

## 2021-09-08 PROCEDURE — 85025 COMPLETE CBC W/AUTO DIFF WBC: CPT | Performed by: INTERNAL MEDICINE

## 2021-09-08 PROCEDURE — 96375 TX/PRO/DX INJ NEW DRUG ADDON: CPT

## 2021-09-08 PROCEDURE — 25010000002 PALONOSETRON PER 25 MCG: Performed by: INTERNAL MEDICINE

## 2021-09-08 PROCEDURE — 96367 TX/PROPH/DG ADDL SEQ IV INF: CPT

## 2021-09-08 PROCEDURE — 25010000002 ETOPOSIDE 500 MG/25ML SOLUTION 25 ML VIAL: Performed by: INTERNAL MEDICINE

## 2021-09-08 PROCEDURE — 25010000002 DEXAMETHASONE SODIUM PHOSPHATE 20 MG/5ML SOLUTION: Performed by: INTERNAL MEDICINE

## 2021-09-08 PROCEDURE — 96413 CHEMO IV INFUSION 1 HR: CPT

## 2021-09-08 PROCEDURE — 25010000002 FOSAPREPITANT PER 1 MG: Performed by: INTERNAL MEDICINE

## 2021-09-08 PROCEDURE — 80053 COMPREHEN METABOLIC PANEL: CPT | Performed by: INTERNAL MEDICINE

## 2021-09-08 PROCEDURE — 25010000002 HEPARIN LOCK FLUSH PER 10 UNITS: Performed by: INTERNAL MEDICINE

## 2021-09-08 RX ORDER — PALONOSETRON 0.05 MG/ML
0.25 INJECTION, SOLUTION INTRAVENOUS ONCE
Status: COMPLETED | OUTPATIENT
Start: 2021-09-08 | End: 2021-09-08

## 2021-09-08 RX ORDER — HEPARIN SODIUM (PORCINE) LOCK FLUSH IV SOLN 100 UNIT/ML 100 UNIT/ML
500 SOLUTION INTRAVENOUS AS NEEDED
Status: DISCONTINUED | OUTPATIENT
Start: 2021-09-08 | End: 2021-09-08 | Stop reason: HOSPADM

## 2021-09-08 RX ORDER — SODIUM CHLORIDE 0.9 % (FLUSH) 0.9 %
10 SYRINGE (ML) INJECTION AS NEEDED
Status: CANCELLED | OUTPATIENT
Start: 2021-09-09

## 2021-09-08 RX ORDER — SODIUM CHLORIDE 9 MG/ML
250 INJECTION, SOLUTION INTRAVENOUS ONCE
Status: COMPLETED | OUTPATIENT
Start: 2021-09-08 | End: 2021-09-08

## 2021-09-08 RX ORDER — HEPARIN SODIUM (PORCINE) LOCK FLUSH IV SOLN 100 UNIT/ML 100 UNIT/ML
500 SOLUTION INTRAVENOUS AS NEEDED
Status: CANCELLED | OUTPATIENT
Start: 2021-09-09

## 2021-09-08 RX ORDER — SODIUM CHLORIDE 0.9 % (FLUSH) 0.9 %
10 SYRINGE (ML) INJECTION AS NEEDED
Status: DISCONTINUED | OUTPATIENT
Start: 2021-09-08 | End: 2021-09-08 | Stop reason: HOSPADM

## 2021-09-08 RX ORDER — OLANZAPINE 5 MG/1
5 TABLET ORAL ONCE
Status: COMPLETED | OUTPATIENT
Start: 2021-09-08 | End: 2021-09-08

## 2021-09-08 RX ADMIN — SODIUM CHLORIDE 250 ML: 9 INJECTION, SOLUTION INTRAVENOUS at 09:37

## 2021-09-08 RX ADMIN — ETOPOSIDE 180 MG: 20 INJECTION, SOLUTION, CONCENTRATE INTRAVENOUS at 10:48

## 2021-09-08 RX ADMIN — FOSAPREPITANT 150 MG: 150 INJECTION, POWDER, LYOPHILIZED, FOR SOLUTION INTRAVENOUS at 09:57

## 2021-09-08 RX ADMIN — CARBOPLATIN 340 MG: 10 INJECTION, SOLUTION INTRAVENOUS at 11:59

## 2021-09-08 RX ADMIN — OLANZAPINE 5 MG: 5 TABLET, FILM COATED ORAL at 09:40

## 2021-09-08 RX ADMIN — PALONOSETRON HYDROCHLORIDE 0.25 MG: 0.25 INJECTION INTRAVENOUS at 09:37

## 2021-09-08 RX ADMIN — Medication 500 UNITS: at 12:55

## 2021-09-08 RX ADMIN — DEXAMETHASONE SODIUM PHOSPHATE 12 MG: 4 INJECTION, SOLUTION INTRAMUSCULAR; INTRAVENOUS at 09:40

## 2021-09-08 RX ADMIN — SODIUM CHLORIDE, PRESERVATIVE FREE 10 ML: 5 INJECTION INTRAVENOUS at 12:55

## 2021-09-08 NOTE — PROGRESS NOTES
NAME: Alycia De La Rosa    : 1950    DATE OF CONSULTATION:  2021    DIAGNOSIS:   Small cell bladder cancer, Limited stage disease    TREATMENT HISTORY:  1.         CHIEF COMPLAINT:  Follow up Bladder cancer/Toxicity check      HISTORY OF PRESENT ILLNESS:   Alycia De La Rosa is a very pleasant 71 y.o. male who is being seen today at the request of Dr. Xavier Arnold for evaluation and treatment of small cell bladder cancer. He first developed gross hematuria in 2021 and was seen by his PCP Dr. Alec Soto.  He had imaging revealing hydronephrosis due to obstruction of the R ureteral orifice with non-functioning R kidney and was referred to Dr. Link who performed cystoscopy with biopsy on 3-17-21.  Cystoscopy revealed a normal urethra nd prostate with indented R ureteral orfice with obvious tumor behind it.  He performed transurethral resection until obvious ureter and sent tumor for pathologic examination.  This showed high grade urothelial carcinoma .  Muscularis propria was present but uninvolved.  Dr. Link referred him to Dr. Sylvester for consideration of R nephrectomy / ureterectomy.  Mr. De La Rosa was referred to Dr. Sylvester who performed repeat cystoscopy 21.  This revealed a large mass pushing into the bladder mucosa / external compression with mass in the R saad-trigone and R bladder wall.  R ureteral orifice could not be identified.  A 10x8 cm mass was resected.  PAthology showed small cell carcinoma with invasion of the muscularis propria as well as focal conventional high grade urothelial carcinoma.  Dr. Sylvester referred him to Dr. Xavier Arnold given small cell histology.  Dr. Lott recommended imaging for staging and treatment with Carbo/Etoposide/Atezolizumab. He was referred here because he preferred to receive treatment close to home.    Mr. De La Rosa presents today with his daughter (who works with Dr. Otoole).  He is overall doing well.  He has had 2 episodes of gross hematuria in total, but this  hasn't been a persistent problem.  He denies weight loss.  No chest pain or shortness of breath. He complains of RLQ pelvic pain which radiates into his R hip/ R upper thigh.  He has been using Tylenol for pain which he says is sufficient, but he is clearly in pain in the office today and is unable to sit in the chair because of pain.         INTERVAL HISTORY:   Mr. De La Rosa presents today for follow up of bladder cancer and toxicity check. He has completed one cycle of carboplatin/etoposide to date. Overall, he reports that he tolerated the treatment well without any noticeable side effects. He does report mild fatigue for a few days after treatment but he remains active. He reports a good appetite and hydrating well. Denies nausea/vomiting or constipation/diarrhea. He reports that pain is well controlled with Morphine ER 15 mg q8h and Oxycodone 5 mg q4h as needed. He is otherwise well and denies any specific complaints today.        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       PAST SURGICAL HISTORY:  Past Surgical History:   Procedure Laterality Date   • CYSTOSCOPY RETROGRADE PYELOGRAM Right 3/17/2021    Procedure: CYSTOSCOPY RETROGRADE PYELOGRAM;  Surgeon: Dennis Link MD;  Location: [facility];  Service: Urology;  Laterality: Right;   • CYSTOSCOPY URETEROSCOPY Bilateral 3/17/2021    Procedure: CYSTOSCOPY TRANSURETHRAL RESECTION OF BLADDER TUMOR;  Surgeon: Dennis Link MD;  Location: [facility];  Service: Urology;  Laterality: Bilateral;   • HYDROCELE EXCISION / REPAIR      x2   • VENOUS ACCESS DEVICE (PORT) INSERTION Left 8/4/2021    Procedure: INSERTION VENOUS ACCESS DEVICE;  Surgeon: Tarvon  Katy CROOKS MD;  Location: [facility];  Service: General;  Laterality: Left;       FAMILY HISTORY:  Family History   Problem Relation Age of Onset   • Cancer Father 84        prostate   • Cancer Mother 73        mouth   • Cancer Maternal Aunt         mouth   • Cancer Maternal Grandfather          SOCIAL HISTORY:  Social History     Socioeconomic History   • Marital status:      Spouse name: Not on file   • Number of children: Not on file   • Years of education: Not on file   • Highest education level: Not on file   Tobacco Use   • Smoking status: Former Smoker     Packs/day: 3.00     Years: 5.00     Pack years: 15.00     Types: Cigarettes     Quit date:      Years since quittin.7   • Smokeless tobacco: Former User   Vaping Use   • Vaping Use: Never used   Substance and Sexual Activity   • Alcohol use: Not Currently   • Drug use: Never   • Sexual activity: Defer     Social History     Social History Narrative    He used to work as a  and denies any unusual chemical exposures.        REVIEW OF SYSTEMS:   A comprehensive 14 point review of systems was performed.  Significant findings as mentioned above.  All other systems reviewed and are negative.      MEDICATIONS:  The current medication list was reviewed in the EMR    Current Outpatient Medications:   •  allopurinol (ZYLOPRIM) 300 MG tablet, Take 1 tablet by mouth Daily., Disp: 30 tablet, Rfl: 3  •  amLODIPine (NORVASC) 10 MG tablet, 10 mg., Disp: , Rfl:   •  chlorproMAZINE (THORAZINE) 25 MG tablet, Take 1 tablet by mouth 3 (Three) Times a Day As Needed (Hiccups)., Disp: 60 tablet, Rfl: 0  •  hydroCHLOROthiazide (HYDRODIURIL) 12.5 MG tablet, 12.5 mg., Disp: , Rfl:   •  HYDROcodone-acetaminophen (NORCO)  MG per tablet, Take 1 tablet by mouth Every 4 (Four) Hours As Needed for Moderate Pain, Disp: 12 tablet, Rfl: 0  •  HYDROcodone-acetaminophen (Norco) 7.5-325 MG per tablet, Take 1 tablet by mouth 4 (Four) Times a Day As Needed for  Moderate Pain ., Disp: 8 tablet, Rfl: 0  •  lidocaine-prilocaine (EMLA) 2.5-2.5 % cream, Apply generous amount to port site 30-45 minutes prior to use and cover with plastic wrap, Disp: 30 g, Rfl: 5  •  metoprolol tartrate (LOPRESSOR) 50 MG tablet, 50 mg., Disp: , Rfl:   •  Morphine (MS CONTIN) 15 MG 12 hr tablet, Take 1 tablet by mouth Every 8 (Eight) Hours., Disp: 90 tablet, Rfl: 0  •  OLANZapine (zyPREXA) 5 MG tablet, Take by mouth on days 2, 3 and 4 after chemotherapy., Disp: 3 tablet, Rfl: 5  •  OLANZapine (zyPREXA) 5 MG tablet, Take 1 tablet by mouth Every Night. Take on days 2, 3 and 4 after chemotherapy., Disp: 3 tablet, Rfl: 5  •  ondansetron (ZOFRAN) 8 MG tablet, Take 1 tablet by mouth Every 8 (Eight) Hours As Needed for Nausea or Vomiting., Disp: 30 tablet, Rfl: 1  •  ondansetron (ZOFRAN) 8 MG tablet, Take 1 tablet by mouth 3 (Three) Times a Day As Needed for Nausea or Vomiting., Disp: 30 tablet, Rfl: 5  •  oxyCODONE (ROXICODONE) 5 MG immediate release tablet, Take 1-2 tabs every 4-6 hours as needed for pain, Disp: 180 tablet, Rfl: 0  •  prochlorperazine (COMPAZINE) 10 MG tablet, Take 1 tablet by mouth Every 6 (Six) Hours As Needed for Nausea or Vomiting., Disp: 60 tablet, Rfl: 1  •  sennosides-docusate (Senna-S) 8.6-50 MG per tablet, Take 2 tablets by mouth 2 (Two) Times a Day., Disp: 120 tablet, Rfl: 5  No current facility-administered medications for this visit.    Facility-Administered Medications Ordered in Other Visits:   •  CARBOplatin (PARAPLATIN) 340 mg in sodium chloride 0.9 % 284 mL chemo IVPB, 340 mg, Intravenous, Once, Jade Mathis MD  •  etoposide (TOPOSAR) 180 mg in sodium chloride 0.9 % 509 mL chemo IVPB, 100 mg/m2 (Treatment Plan Recorded), Intravenous, Once, Jade Mathis MD, 180 mg at 09/08/21 1048  •  heparin injection 500 Units, 500 Units, Intravenous, PRN, Jade Mathis MD  •  sodium chloride 0.9 % flush 10 mL, 10 mL, Intravenous, PRN, Jade Mathis,  MD    ALLERGIES:    Allergies   Allergen Reactions   • Lisinopril Anaphylaxis and Rash   • Penicillins Anaphylaxis and Rash       PHYSICAL EXAM:  Vitals:    09/08/21 0832   BP: 113/64   Pulse: 63   Resp: 16   Temp: 98.2 °F (36.8 °C)   SpO2: 97%     Pain Score    09/08/21 0832   PainSc: 0-No pain     General:  Awake, alert and oriented, appears well. In no acute distress.  HEENT:  Pupils are equal, round and reactive to light and accommodation, Extra-ocular movements full, Oropharyx clear, mucous membranes moist  Neck:  No JVD, thyromegaly or lymphadenopathy  CV:  Regular rate and rhythm, no murmurs, rubs or gallops  Resp:  Lungs are clear to auscultation bilaterally, no crackles or wheezes  Abd:  Soft, non-tender, non-distended, bowel sounds present, no organomegaly or masses  Ext:  No clubbing, cyanosis or edema  Lymph:  No cervical, supraclavicular, axillary adenopathy  Neuro: Grossly non-focal exam      PATHOLOGY:  03-17-21 04-20-21 06-14-21          ENDOSCOPY:  Cystoscopy 03-17-21 ([facility]):  Findings: Probable right distal ureteral tumor with nonfunctioning kidney and massive dilation      Cystourethroscopy, TURP 06-14-21 ([facility])  Findings:   1.Left ureteral orifice identified. Retrograde pyelography without hydronephrosis or evidence of filling defect.   2. Large mass pushing into bladder mucosa/external compression- no obvious urothelial papillar lesion. Mass located in the right saad-trigone and right bladder wall. Inability to identify right ureteral orifice  3. Resection of mass with resection size of 10cm x 8 cm.   4. Hemostasis achieved at conclusion of case  5. Placement of 20 F 2 way catheter         IMAGING:  CT Chest 04-12-21  FINDINGS:    LUNGS:  Unremarkable.  No mass.  No consolidation.    PLEURAL SPACE:  Unremarkable.  No pneumothorax.  No significant  effusion.    HEART:  Unremarkable.  No cardiomegaly.  No significant pericardial  effusion.    MEDIASTINUM:  Moderate-sized hiatal  hernia.    BONES/JOINTS:  Unremarkable.  No acute fracture.  No dislocation.    SOFT TISSUES:  Unremarkable.    VASCULATURE:  Unremarkable.  No thoracic aortic aneurysm.    LYMPH NODES:  Unremarkable.  No enlarged lymph nodes.    LIVER:  Left lobe of liver cyst measuring 3 cm.    KIDNEYS AND URETERS:  Incidentally imaged right upper kidney shows  moderate hydronephrosis and cortical thinning.     IMPRESSION:    Moderate-sized hiatal hernia.        PET/CT 08-06-21  FINDINGS:      HEAD/NECK:  No FDG hypermetabolic neck adenopathy.  No FDG hypermetabolic masses.     CHEST:   No FDG hypermetabolic thoracic adenopathy.  No FDG hypermetabolic lung nodules or masses.  Evaluation for tiny parenchymal nodules is somewhat limited on low dose  CT secondary to respiratory motion.     ABDOMEN/PELVIS:   The right kidney shows cortical thinning and the right collecting system  is diffusely dilated to the level of the urinary bladder where the wall  is thickened. The left kidney shows diffuse left-sided dilatation as  well.  The pelvis shows heterogeneous soft tissue mass which appears to be  intimate with the prostate. Significantly larger today than on the  previous CT scan.  Large right-sided hydrocele is present.  There is hypermetabolic activity throughout the dilated left ureter as  well as in the left renal pelvis. This likely represents excreted  radiotracer.  Extensive increased activity intermixed in the heterogeneous appearance  of the prostate.     BONES: There are scattered foci of FDG hypermetabolism most suggestive  of degenerative change seen throughout the axial skeleton. If concern  persist for metastatic lesions of the bone, HDP Bone scan is recommended  for further evaluation.     IMPRESSION:     1. Hydronephrosis bilaterally and hydroureter bilaterally to the level  of a thickened urinary bladder.  2. Diffuse increased activity throughout the left collecting system as  well as in the bladder.  3. Heterogeneous  soft tissue mass in the pelvis intimate with the  prostate. The periphery does show hypermetabolic activity. Cannot  completely separate this activity from the bladder. Maximum SUV is  9.656. Urologic follow-up is suggested  4. No other areas of abnormal hypermetabolic activity.      RECENT LABS:  Lab Results   Component Value Date    WBC 10.18 09/08/2021    HGB 11.8 (L) 09/08/2021    HCT 33.6 (L) 09/08/2021    MCV 88.0 09/08/2021    RDW 13.4 09/08/2021     09/08/2021    NEUTRORELPCT 75.8 09/08/2021    LYMPHORELPCT 13.0 (L) 09/08/2021    MONORELPCT 7.5 09/08/2021    EOSRELPCT 0.6 09/08/2021    BASORELPCT 1.3 09/08/2021    NEUTROABS 7.73 (H) 09/08/2021    LYMPHSABS 1.32 09/08/2021       Lab Results   Component Value Date     09/08/2021    K 3.4 (L) 09/08/2021    CO2 30.7 (H) 09/08/2021    CL 98 09/08/2021    BUN 12 09/08/2021    CREATININE 1.47 (H) 09/08/2021    EGFRIFNONA 47 (L) 09/08/2021    GLUCOSE 118 (H) 09/08/2021    CALCIUM 9.5 09/08/2021    ALKPHOS 88 09/08/2021    AST 19 09/08/2021    ALT 12 09/08/2021    BILITOT 0.4 09/08/2021    ALBUMIN 3.85 09/08/2021    PROTEINTOT 6.6 09/08/2021     Lab Results   Component Value Date    FERRITIN 823.40 (H) 07/19/2021    IRON 79 07/19/2021    TIBC 305 07/19/2021    LABIRON 26 07/19/2021    [id number] 437 07/19/2021    FOLATE 9.52 07/19/2021       Lab Results   Component Value Date/Time     (H) 08/26/2021 09:23 AM    URICACID 5.3 08/26/2021 09:23 AM         ASSESSMENT & PLAN:  Racine De La Rosa is a very pleasant 71 y.o. 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    1.  Small Cell Carcinoma of the Bladder:  -  Appeaars to have localized disease.   -  Clinically has [id number] disease with involvement of the R pelvic sidewall.    -  Given limited stage disease, recommended treatment with Carboplatin/Etoposide followed by either cystectomy  or radiation.  Used Carboplatin over Cisplatin given solitary functional kidney.  - He has completed one cycle of carboplatin/etoposide. Overall, he tolerated treatment exceptionally well without any significant side effects.   - Exam and labs from today without cause for concern. Will proceed with cycle two carboplatin/etoposide today as planned.  - Advised to continue Allopurinol 300 mg daily until follow up with Dr. Mathis.     2.  Neoplasm related pain:  - Currently on Morphine ER 15 mg q8h and Oxycodone 5 mg q4h as needed. He reports that pain is well controlled with the addition of Morphine.   - Advised to avoid Ibuprofen.  - Warned about constipation and recommended use  Of Senna/Colace ii BID with Miralax as needed if he needs to take narcotics routinely.    3.  Prophlaxis:  He hasn't had Prevnar 13, 2020 influenza or COVID 19 vaccinations.  He says that with PCN allergy (anaphylaxis) he has been very hesitant to get vaccinations.  Explained that none of the vaccines contain PCN.  We again discussed COVID vaccination, but he again declined.    4.  Follow up:   - Continue Allopurinol   - With MD as scheduled on day #1 of cycle #3 carboplatin/etoposide.    ACO / MERLY/Other  Quality measures  -  Alycia De La Rosa did not receive 2020 flu vaccine.  -  Alycia De La Rosa reports a pain score of 0.  -  Current outpatient and discharge medications have been reconciled for the patient.  Reviewed by: BETITO Martinez        I spent 25 minutes with Alycia De La Rosa today.  In the office today, more than 50% of this time was spent face-to-face with him  in counseling / coordination of care, reviewing his medical history and counseling on the current treatment plan.  All questions were answered to his satisfaction      Electronically Signed by: BETITO Martinez      CC:     MD Xavier Garza MD Barbara Michna, MD

## 2021-09-09 ENCOUNTER — INFUSION (OUTPATIENT)
Dept: ONCOLOGY | Facility: HOSPITAL | Age: 71
End: 2021-09-09

## 2021-09-09 VITALS
OXYGEN SATURATION: 97 % | HEART RATE: 71 BPM | HEIGHT: 67 IN | SYSTOLIC BLOOD PRESSURE: 113 MMHG | RESPIRATION RATE: 16 BRPM | DIASTOLIC BLOOD PRESSURE: 64 MMHG | TEMPERATURE: 98.4 F | BODY MASS INDEX: 24.01 KG/M2 | WEIGHT: 153 LBS

## 2021-09-09 DIAGNOSIS — T45.1X5A CHEMOTHERAPY INDUCED NEUTROPENIA (HCC): Primary | ICD-10-CM

## 2021-09-09 DIAGNOSIS — D70.1 CHEMOTHERAPY INDUCED NEUTROPENIA (HCC): Primary | ICD-10-CM

## 2021-09-09 DIAGNOSIS — C67.9 SMALL CELL CARCINOMA OF BLADDER (HCC): ICD-10-CM

## 2021-09-09 DIAGNOSIS — C66.1 URETERAL CARCINOMA, RIGHT (HCC): ICD-10-CM

## 2021-09-09 PROCEDURE — 25010000002 HEPARIN LOCK FLUSH PER 10 UNITS: Performed by: INTERNAL MEDICINE

## 2021-09-09 PROCEDURE — 25010000002 ETOPOSIDE 500 MG/25ML SOLUTION 25 ML VIAL: Performed by: INTERNAL MEDICINE

## 2021-09-09 PROCEDURE — 96375 TX/PRO/DX INJ NEW DRUG ADDON: CPT

## 2021-09-09 PROCEDURE — 96413 CHEMO IV INFUSION 1 HR: CPT

## 2021-09-09 PROCEDURE — 25010000002 DEXAMETHASONE SODIUM PHOSPHATE 20 MG/5ML SOLUTION: Performed by: INTERNAL MEDICINE

## 2021-09-09 RX ORDER — SODIUM CHLORIDE 9 MG/ML
250 INJECTION, SOLUTION INTRAVENOUS ONCE
Status: COMPLETED | OUTPATIENT
Start: 2021-09-09 | End: 2021-09-09

## 2021-09-09 RX ORDER — SODIUM CHLORIDE 0.9 % (FLUSH) 0.9 %
10 SYRINGE (ML) INJECTION AS NEEDED
Status: DISCONTINUED | OUTPATIENT
Start: 2021-09-09 | End: 2021-09-09 | Stop reason: HOSPADM

## 2021-09-09 RX ORDER — SODIUM CHLORIDE 0.9 % (FLUSH) 0.9 %
10 SYRINGE (ML) INJECTION AS NEEDED
Status: CANCELLED | OUTPATIENT
Start: 2021-09-10

## 2021-09-09 RX ORDER — HEPARIN SODIUM (PORCINE) LOCK FLUSH IV SOLN 100 UNIT/ML 100 UNIT/ML
500 SOLUTION INTRAVENOUS AS NEEDED
Status: CANCELLED | OUTPATIENT
Start: 2021-09-10

## 2021-09-09 RX ORDER — HEPARIN SODIUM (PORCINE) LOCK FLUSH IV SOLN 100 UNIT/ML 100 UNIT/ML
500 SOLUTION INTRAVENOUS AS NEEDED
Status: DISCONTINUED | OUTPATIENT
Start: 2021-09-09 | End: 2021-09-09 | Stop reason: HOSPADM

## 2021-09-09 RX ADMIN — DEXAMETHASONE SODIUM PHOSPHATE 12 MG: 4 INJECTION, SOLUTION INTRAMUSCULAR; INTRAVENOUS at 09:00

## 2021-09-09 RX ADMIN — SODIUM CHLORIDE, PRESERVATIVE FREE 10 ML: 5 INJECTION INTRAVENOUS at 10:50

## 2021-09-09 RX ADMIN — Medication 500 UNITS: at 10:50

## 2021-09-09 RX ADMIN — ETOPOSIDE 180 MG: 20 INJECTION, SOLUTION, CONCENTRATE INTRAVENOUS at 09:26

## 2021-09-09 RX ADMIN — SODIUM CHLORIDE 250 ML: 9 INJECTION, SOLUTION INTRAVENOUS at 09:00

## 2021-09-10 ENCOUNTER — APPOINTMENT (OUTPATIENT)
Dept: ONCOLOGY | Facility: HOSPITAL | Age: 71
End: 2021-09-10

## 2021-09-10 ENCOUNTER — INFUSION (OUTPATIENT)
Dept: ONCOLOGY | Facility: HOSPITAL | Age: 71
End: 2021-09-10

## 2021-09-10 VITALS
RESPIRATION RATE: 18 BRPM | DIASTOLIC BLOOD PRESSURE: 65 MMHG | TEMPERATURE: 97.7 F | OXYGEN SATURATION: 98 % | BODY MASS INDEX: 23.81 KG/M2 | WEIGHT: 152 LBS | HEART RATE: 64 BPM | SYSTOLIC BLOOD PRESSURE: 120 MMHG

## 2021-09-10 DIAGNOSIS — C67.9 SMALL CELL CARCINOMA OF BLADDER (HCC): ICD-10-CM

## 2021-09-10 DIAGNOSIS — T45.1X5A CHEMOTHERAPY INDUCED NEUTROPENIA (HCC): Primary | ICD-10-CM

## 2021-09-10 DIAGNOSIS — C66.1 URETERAL CARCINOMA, RIGHT (HCC): ICD-10-CM

## 2021-09-10 DIAGNOSIS — D70.1 CHEMOTHERAPY INDUCED NEUTROPENIA (HCC): Primary | ICD-10-CM

## 2021-09-10 PROCEDURE — 25010000002 HEPARIN LOCK FLUSH PER 10 UNITS: Performed by: INTERNAL MEDICINE

## 2021-09-10 PROCEDURE — 25010000002 ETOPOSIDE 500 MG/25ML SOLUTION 25 ML VIAL: Performed by: INTERNAL MEDICINE

## 2021-09-10 PROCEDURE — 96375 TX/PRO/DX INJ NEW DRUG ADDON: CPT

## 2021-09-10 PROCEDURE — 25010000002 PEGFILGRASTIM 6 MG/0.6ML PREFILLED SYRINGE KIT: Performed by: INTERNAL MEDICINE

## 2021-09-10 PROCEDURE — 25010000002 DEXAMETHASONE SODIUM PHOSPHATE 20 MG/5ML SOLUTION: Performed by: INTERNAL MEDICINE

## 2021-09-10 PROCEDURE — 96377 APPLICATON ON-BODY INJECTOR: CPT

## 2021-09-10 PROCEDURE — 96413 CHEMO IV INFUSION 1 HR: CPT

## 2021-09-10 RX ORDER — SODIUM CHLORIDE 9 MG/ML
250 INJECTION, SOLUTION INTRAVENOUS ONCE
Status: COMPLETED | OUTPATIENT
Start: 2021-09-10 | End: 2021-09-10

## 2021-09-10 RX ORDER — SODIUM CHLORIDE 0.9 % (FLUSH) 0.9 %
10 SYRINGE (ML) INJECTION AS NEEDED
Status: CANCELLED | OUTPATIENT
Start: 2021-09-29

## 2021-09-10 RX ORDER — SODIUM CHLORIDE 0.9 % (FLUSH) 0.9 %
10 SYRINGE (ML) INJECTION AS NEEDED
Status: DISCONTINUED | OUTPATIENT
Start: 2021-09-10 | End: 2021-09-10 | Stop reason: HOSPADM

## 2021-09-10 RX ORDER — HEPARIN SODIUM (PORCINE) LOCK FLUSH IV SOLN 100 UNIT/ML 100 UNIT/ML
500 SOLUTION INTRAVENOUS AS NEEDED
Status: DISCONTINUED | OUTPATIENT
Start: 2021-09-10 | End: 2021-09-10 | Stop reason: HOSPADM

## 2021-09-10 RX ORDER — HEPARIN SODIUM (PORCINE) LOCK FLUSH IV SOLN 100 UNIT/ML 100 UNIT/ML
500 SOLUTION INTRAVENOUS AS NEEDED
Status: CANCELLED | OUTPATIENT
Start: 2021-09-29

## 2021-09-10 RX ADMIN — PEGFILGRASTIM 6 MG: KIT SUBCUTANEOUS at 12:48

## 2021-09-10 RX ADMIN — DEXAMETHASONE SODIUM PHOSPHATE 12 MG: 4 INJECTION, SOLUTION INTRAMUSCULAR; INTRAVENOUS at 11:05

## 2021-09-10 RX ADMIN — ETOPOSIDE 180 MG: 20 INJECTION, SOLUTION, CONCENTRATE INTRAVENOUS at 11:23

## 2021-09-10 RX ADMIN — Medication 500 UNITS: at 12:48

## 2021-09-10 RX ADMIN — SODIUM CHLORIDE, PRESERVATIVE FREE 10 ML: 5 INJECTION INTRAVENOUS at 12:48

## 2021-09-10 RX ADMIN — SODIUM CHLORIDE 250 ML: 9 INJECTION, SOLUTION INTRAVENOUS at 11:05

## 2021-09-24 DIAGNOSIS — C67.9 SMALL CELL CARCINOMA OF BLADDER (HCC): ICD-10-CM

## 2021-09-24 DIAGNOSIS — D70.1 CHEMOTHERAPY INDUCED NEUTROPENIA (HCC): Primary | ICD-10-CM

## 2021-09-24 DIAGNOSIS — T45.1X5A CHEMOTHERAPY INDUCED NEUTROPENIA (HCC): Primary | ICD-10-CM

## 2021-09-24 RX ORDER — SODIUM CHLORIDE 9 MG/ML
250 INJECTION, SOLUTION INTRAVENOUS ONCE
Status: CANCELLED | OUTPATIENT
Start: 2021-10-01

## 2021-09-24 RX ORDER — SODIUM CHLORIDE 9 MG/ML
250 INJECTION, SOLUTION INTRAVENOUS ONCE
Status: CANCELLED | OUTPATIENT
Start: 2021-09-29

## 2021-09-24 RX ORDER — FAMOTIDINE 10 MG/ML
20 INJECTION, SOLUTION INTRAVENOUS AS NEEDED
Status: CANCELLED | OUTPATIENT
Start: 2021-09-29

## 2021-09-24 RX ORDER — DIPHENHYDRAMINE HYDROCHLORIDE 50 MG/ML
50 INJECTION INTRAMUSCULAR; INTRAVENOUS AS NEEDED
Status: CANCELLED | OUTPATIENT
Start: 2021-09-29

## 2021-09-24 RX ORDER — PALONOSETRON 0.05 MG/ML
0.25 INJECTION, SOLUTION INTRAVENOUS ONCE
Status: CANCELLED | OUTPATIENT
Start: 2021-09-29

## 2021-09-24 RX ORDER — SODIUM CHLORIDE 9 MG/ML
250 INJECTION, SOLUTION INTRAVENOUS ONCE
Status: CANCELLED | OUTPATIENT
Start: 2021-09-30

## 2021-09-24 RX ORDER — OLANZAPINE 5 MG/1
5 TABLET ORAL ONCE
Status: CANCELLED | OUTPATIENT
Start: 2021-09-29 | End: 2021-09-29

## 2021-09-29 ENCOUNTER — INFUSION (OUTPATIENT)
Dept: ONCOLOGY | Facility: HOSPITAL | Age: 71
End: 2021-09-29

## 2021-09-29 ENCOUNTER — OFFICE VISIT (OUTPATIENT)
Dept: ONCOLOGY | Facility: CLINIC | Age: 71
End: 2021-09-29

## 2021-09-29 VITALS
WEIGHT: 151.2 LBS | DIASTOLIC BLOOD PRESSURE: 69 MMHG | BODY MASS INDEX: 23.68 KG/M2 | HEART RATE: 68 BPM | TEMPERATURE: 98.2 F | OXYGEN SATURATION: 98 % | SYSTOLIC BLOOD PRESSURE: 113 MMHG | RESPIRATION RATE: 18 BRPM

## 2021-09-29 VITALS
BODY MASS INDEX: 23.68 KG/M2 | OXYGEN SATURATION: 98 % | SYSTOLIC BLOOD PRESSURE: 113 MMHG | RESPIRATION RATE: 18 BRPM | TEMPERATURE: 98.2 F | HEART RATE: 68 BPM | WEIGHT: 151.2 LBS | DIASTOLIC BLOOD PRESSURE: 69 MMHG

## 2021-09-29 DIAGNOSIS — C67.9 SMALL CELL CARCINOMA OF BLADDER (HCC): ICD-10-CM

## 2021-09-29 DIAGNOSIS — G89.3 NEOPLASM RELATED PAIN: ICD-10-CM

## 2021-09-29 DIAGNOSIS — T45.1X5A CHEMOTHERAPY INDUCED NEUTROPENIA (HCC): Primary | ICD-10-CM

## 2021-09-29 DIAGNOSIS — C66.1 URETERAL CARCINOMA, RIGHT (HCC): ICD-10-CM

## 2021-09-29 DIAGNOSIS — T45.1X5A CHEMOTHERAPY INDUCED NEUTROPENIA (HCC): ICD-10-CM

## 2021-09-29 DIAGNOSIS — D70.1 CHEMOTHERAPY INDUCED NEUTROPENIA (HCC): ICD-10-CM

## 2021-09-29 DIAGNOSIS — C67.9 SMALL CELL CARCINOMA OF BLADDER (HCC): Primary | ICD-10-CM

## 2021-09-29 DIAGNOSIS — D70.1 CHEMOTHERAPY INDUCED NEUTROPENIA (HCC): Primary | ICD-10-CM

## 2021-09-29 LAB
ALBUMIN SERPL-MCNC: 4.4 G/DL (ref 3.5–5.2)
ALBUMIN/GLOB SERPL: 1.7 G/DL
ALP SERPL-CCNC: 67 U/L (ref 39–117)
ALT SERPL W P-5'-P-CCNC: 13 U/L (ref 1–41)
ANION GAP SERPL CALCULATED.3IONS-SCNC: 9.9 MMOL/L (ref 5–15)
AST SERPL-CCNC: 16 U/L (ref 1–40)
BASOPHILS # BLD MANUAL: 0.03 10*3/MM3 (ref 0–0.2)
BASOPHILS NFR BLD AUTO: 1 % (ref 0–1.5)
BILIRUB SERPL-MCNC: 0.4 MG/DL (ref 0–1.2)
BUN SERPL-MCNC: 11 MG/DL (ref 8–23)
BUN/CREAT SERPL: 8.3 (ref 7–25)
CALCIUM SPEC-SCNC: 9.8 MG/DL (ref 8.6–10.5)
CHLORIDE SERPL-SCNC: 102 MMOL/L (ref 98–107)
CO2 SERPL-SCNC: 29.1 MMOL/L (ref 22–29)
CREAT SERPL-MCNC: 1.33 MG/DL (ref 0.76–1.27)
DEPRECATED RDW RBC AUTO: 55.5 FL (ref 37–54)
EOSINOPHIL # BLD MANUAL: 0.1 10*3/MM3 (ref 0–0.4)
EOSINOPHIL NFR BLD MANUAL: 3 % (ref 0.3–6.2)
ERYTHROCYTE [DISTWIDTH] IN BLOOD BY AUTOMATED COUNT: 17.3 % (ref 12.3–15.4)
GFR SERPL CREATININE-BSD FRML MDRD: 53 ML/MIN/1.73
GLOBULIN UR ELPH-MCNC: 2.6 GM/DL
GLUCOSE SERPL-MCNC: 100 MG/DL (ref 65–99)
HCT VFR BLD AUTO: 34.4 % (ref 37.5–51)
HGB BLD-MCNC: 12 G/DL (ref 13–17.7)
LYMPHOCYTES # BLD MANUAL: 1.83 10*3/MM3 (ref 0.7–3.1)
LYMPHOCYTES NFR BLD MANUAL: 20 % (ref 5–12)
LYMPHOCYTES NFR BLD MANUAL: 56 % (ref 19.6–45.3)
MCH RBC QN AUTO: 31.6 PG (ref 26.6–33)
MCHC RBC AUTO-ENTMCNC: 34.9 G/DL (ref 31.5–35.7)
MCV RBC AUTO: 90.5 FL (ref 79–97)
MONOCYTES # BLD AUTO: 0.65 10*3/MM3 (ref 0.1–0.9)
NEUTROPHILS # BLD AUTO: 0.65 10*3/MM3 (ref 1.7–7)
NEUTROPHILS NFR BLD MANUAL: 19 % (ref 42.7–76)
NEUTS BAND NFR BLD MANUAL: 1 % (ref 0–5)
PLAT MORPH BLD: NORMAL
PLATELET # BLD AUTO: 275 10*3/MM3 (ref 140–450)
PMV BLD AUTO: 8.5 FL (ref 6–12)
POTASSIUM SERPL-SCNC: 3.7 MMOL/L (ref 3.5–5.2)
PROT SERPL-MCNC: 7 G/DL (ref 6–8.5)
RBC # BLD AUTO: 3.8 10*6/MM3 (ref 4.14–5.8)
RBC MORPH BLD: NORMAL
SCAN SLIDE: NORMAL
SODIUM SERPL-SCNC: 141 MMOL/L (ref 136–145)
WBC # BLD AUTO: 3.26 10*3/MM3 (ref 3.4–10.8)

## 2021-09-29 PROCEDURE — 85007 BL SMEAR W/DIFF WBC COUNT: CPT

## 2021-09-29 PROCEDURE — 25010000002 ETOPOSIDE 500 MG/25ML SOLUTION 25 ML VIAL: Performed by: INTERNAL MEDICINE

## 2021-09-29 PROCEDURE — 96413 CHEMO IV INFUSION 1 HR: CPT

## 2021-09-29 PROCEDURE — 80053 COMPREHEN METABOLIC PANEL: CPT

## 2021-09-29 PROCEDURE — 25010000002 CARBOPLATIN PER 50 MG: Performed by: INTERNAL MEDICINE

## 2021-09-29 PROCEDURE — 25010000002 PALONOSETRON PER 25 MCG: Performed by: INTERNAL MEDICINE

## 2021-09-29 PROCEDURE — 25010000002 FOSAPREPITANT PER 1 MG: Performed by: INTERNAL MEDICINE

## 2021-09-29 PROCEDURE — 25010000002 HEPARIN LOCK FLUSH PER 10 UNITS

## 2021-09-29 PROCEDURE — 99213 OFFICE O/P EST LOW 20 MIN: CPT | Performed by: NURSE PRACTITIONER

## 2021-09-29 PROCEDURE — 96367 TX/PROPH/DG ADDL SEQ IV INF: CPT

## 2021-09-29 PROCEDURE — 25010000002 DEXAMETHASONE SODIUM PHOSPHATE 20 MG/5ML SOLUTION: Performed by: INTERNAL MEDICINE

## 2021-09-29 PROCEDURE — 96417 CHEMO IV INFUS EACH ADDL SEQ: CPT

## 2021-09-29 PROCEDURE — 85025 COMPLETE CBC W/AUTO DIFF WBC: CPT

## 2021-09-29 PROCEDURE — 96375 TX/PRO/DX INJ NEW DRUG ADDON: CPT

## 2021-09-29 RX ORDER — OLANZAPINE 5 MG/1
5 TABLET ORAL ONCE
Status: COMPLETED | OUTPATIENT
Start: 2021-09-29 | End: 2021-09-29

## 2021-09-29 RX ORDER — SODIUM CHLORIDE 0.9 % (FLUSH) 0.9 %
10 SYRINGE (ML) INJECTION AS NEEDED
Status: DISCONTINUED | OUTPATIENT
Start: 2021-09-29 | End: 2021-09-29 | Stop reason: HOSPADM

## 2021-09-29 RX ORDER — HEPARIN SODIUM (PORCINE) LOCK FLUSH IV SOLN 100 UNIT/ML 100 UNIT/ML
SOLUTION INTRAVENOUS
Status: COMPLETED
Start: 2021-09-29 | End: 2021-09-29

## 2021-09-29 RX ORDER — HEPARIN SODIUM (PORCINE) LOCK FLUSH IV SOLN 100 UNIT/ML 100 UNIT/ML
500 SOLUTION INTRAVENOUS AS NEEDED
Status: CANCELLED | OUTPATIENT
Start: 2021-09-30

## 2021-09-29 RX ORDER — SODIUM CHLORIDE 0.9 % (FLUSH) 0.9 %
10 SYRINGE (ML) INJECTION AS NEEDED
Status: CANCELLED | OUTPATIENT
Start: 2021-09-30

## 2021-09-29 RX ORDER — PALONOSETRON 0.05 MG/ML
0.25 INJECTION, SOLUTION INTRAVENOUS ONCE
Status: COMPLETED | OUTPATIENT
Start: 2021-09-29 | End: 2021-09-29

## 2021-09-29 RX ORDER — SODIUM CHLORIDE 9 MG/ML
250 INJECTION, SOLUTION INTRAVENOUS ONCE
Status: COMPLETED | OUTPATIENT
Start: 2021-09-29 | End: 2021-09-29

## 2021-09-29 RX ORDER — HEPARIN SODIUM (PORCINE) LOCK FLUSH IV SOLN 100 UNIT/ML 100 UNIT/ML
500 SOLUTION INTRAVENOUS AS NEEDED
Status: DISCONTINUED | OUTPATIENT
Start: 2021-09-29 | End: 2021-09-29 | Stop reason: HOSPADM

## 2021-09-29 RX ADMIN — SODIUM CHLORIDE, PRESERVATIVE FREE 500 UNITS: 5 INJECTION INTRAVENOUS at 12:25

## 2021-09-29 RX ADMIN — OLANZAPINE 5 MG: 5 TABLET, FILM COATED ORAL at 09:45

## 2021-09-29 RX ADMIN — CARBOPLATIN 360 MG: 10 INJECTION, SOLUTION INTRAVENOUS at 11:40

## 2021-09-29 RX ADMIN — SODIUM CHLORIDE, PRESERVATIVE FREE 10 ML: 5 INJECTION INTRAVENOUS at 12:25

## 2021-09-29 RX ADMIN — ETOPOSIDE 180 MG: 20 INJECTION, SOLUTION, CONCENTRATE INTRAVENOUS at 10:35

## 2021-09-29 RX ADMIN — HEPARIN SODIUM (PORCINE) LOCK FLUSH IV SOLN 100 UNIT/ML 500 UNITS: 100 SOLUTION at 12:25

## 2021-09-29 RX ADMIN — FOSAPREPITANT 150 MG: 150 INJECTION, POWDER, LYOPHILIZED, FOR SOLUTION INTRAVENOUS at 10:00

## 2021-09-29 RX ADMIN — SODIUM CHLORIDE 250 ML: 9 INJECTION, SOLUTION INTRAVENOUS at 09:45

## 2021-09-29 RX ADMIN — DEXAMETHASONE SODIUM PHOSPHATE 12 MG: 4 INJECTION, SOLUTION INTRAMUSCULAR; INTRAVENOUS at 09:45

## 2021-09-29 RX ADMIN — PALONOSETRON HYDROCHLORIDE 0.25 MG: 0.25 INJECTION INTRAVENOUS at 09:45

## 2021-09-29 NOTE — PROGRESS NOTES
NAME: Alycia De La Rosa    : 1950    DATE OF FOLLOW UP:  2021    DIAGNOSIS:   Small cell bladder cancer, Limited stage disease    TREATMENT HISTORY:  1.         CHIEF COMPLAINT:  Follow up Bladder cancer/Toxicity check      HISTORY OF PRESENT ILLNESS:   Alycia De La Rosa is a very pleasant 71 y.o. male who is being seen today at the request of Dr. Xavier Arnold for evaluation and treatment of small cell bladder cancer. He first developed gross hematuria in 2021 and was seen by his PCP Dr. Alec Soto.  He had imaging revealing hydronephrosis due to obstruction of the R ureteral orifice with non-functioning R kidney and was referred to Dr. Link who performed cystoscopy with biopsy on 3-17-21.  Cystoscopy revealed a normal urethra nd prostate with indented R ureteral orfice with obvious tumor behind it.  He performed transurethral resection until obvious ureter and sent tumor for pathologic examination.  This showed high grade urothelial carcinoma .  Muscularis propria was present but uninvolved.  Dr. Link referred him to Dr. Sylvester for consideration of R nephrectomy / ureterectomy.  Mr. De La Rosa was referred to Dr. Sylvester who performed repeat cystoscopy 21.  This revealed a large mass pushing into the bladder mucosa / external compression with mass in the R saad-trigone and R bladder wall.  R ureteral orifice could not be identified.  A 10x8 cm mass was resected.  PAthology showed small cell carcinoma with invasion of the muscularis propria as well as focal conventional high grade urothelial carcinoma.  Dr. Sylvester referred him to Dr. Xavier Arnold given small cell histology.  Dr. Lott recommended imaging for staging and treatment with Carbo/Etoposide/Atezolizumab. He was referred here because he preferred to receive treatment close to home.    Mr. De La Rosa presents today with his daughter (who works with Dr. Otoole).  He is overall doing well.  He has had 2 episodes of gross hematuria in total, but this  hasn't been a persistent problem.  He denies weight loss.  No chest pain or shortness of breath. He complains of RLQ pelvic pain which radiates into his R hip/ R upper thigh.  He has been using Tylenol for pain which he says is sufficient, but he is clearly in pain in the office today and is unable to sit in the chair because of pain.         INTERVAL HISTORY:   Mr. De La Rosa presents today for follow up of bladder cancer and toxicity check. He has completed two cycles of carboplatin/etoposide to date. Overall, he reports that he has continued to tolerate the treatment well without any noticeable side effects. He does report mild fatigue for a few days after treatment but he remains active. He reports a good appetite and hydrating well. Denies nausea/vomiting or constipation/diarrhea. He reports that pain has significantly improved and he is currently only requiring Morphine ER 15 mg in the am and Oxycodone 5 mg once daily as needed. He is otherwise well and denies any specific complaints today.        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       PAST SURGICAL HISTORY:  Past Surgical History:   Procedure Laterality Date   • CYSTOSCOPY RETROGRADE PYELOGRAM Right 3/17/2021    Procedure: CYSTOSCOPY RETROGRADE PYELOGRAM;  Surgeon: Dennis Link MD;  Location: [facility];  Service: Urology;  Laterality: Right;   • CYSTOSCOPY URETEROSCOPY Bilateral 3/17/2021    Procedure: CYSTOSCOPY TRANSURETHRAL RESECTION OF BLADDER TUMOR;  Surgeon: Dennis Link MD;  Location: [facility];  Service: Urology;  Laterality: Bilateral;   • HYDROCELE EXCISION / REPAIR      x2   • VENOUS ACCESS DEVICE (PORT) INSERTION Left  2021    Procedure: INSERTION VENOUS ACCESS DEVICE;  Surgeon: Katy Cool MD;  Location: [facility];  Service: General;  Laterality: Left;       FAMILY HISTORY:  Family History   Problem Relation Age of Onset   • Cancer Father 84        prostate   • Cancer Mother 73        mouth   • Cancer Maternal Aunt         mouth   • Cancer Maternal Grandfather          SOCIAL HISTORY:  Social History     Socioeconomic History   • Marital status:      Spouse name: Not on file   • Number of children: Not on file   • Years of education: Not on file   • Highest education level: Not on file   Tobacco Use   • Smoking status: Former Smoker     Packs/day: 3.00     Years: 5.00     Pack years: 15.00     Types: Cigarettes     Quit date:      Years since quittin.7   • Smokeless tobacco: Former User   Vaping Use   • Vaping Use: Never used   Substance and Sexual Activity   • Alcohol use: Not Currently   • Drug use: Never   • Sexual activity: Defer     Social History     Social History Narrative    He used to work as a  and denies any unusual chemical exposures.        REVIEW OF SYSTEMS:   A comprehensive 14 point review of systems was performed.  Significant findings as mentioned above.  All other systems reviewed and are negative.      MEDICATIONS:  The current medication list was reviewed in the EMR    Current Outpatient Medications:   •  allopurinol (ZYLOPRIM) 300 MG tablet, Take 1 tablet by mouth Daily., Disp: 30 tablet, Rfl: 3  •  amLODIPine (NORVASC) 10 MG tablet, 10 mg., Disp: , Rfl:   •  chlorproMAZINE (THORAZINE) 25 MG tablet, Take 1 tablet by mouth 3 (Three) Times a Day As Needed (Hiccups)., Disp: 60 tablet, Rfl: 0  •  hydroCHLOROthiazide (HYDRODIURIL) 12.5 MG tablet, 12.5 mg., Disp: , Rfl:   •  HYDROcodone-acetaminophen (NORCO)  MG per tablet, Take 1 tablet by mouth Every 4 (Four) Hours As Needed for Moderate Pain, Disp: 12 tablet, Rfl: 0  •  HYDROcodone-acetaminophen (Norco) 7.5-325 MG  per tablet, Take 1 tablet by mouth 4 (Four) Times a Day As Needed for Moderate Pain ., Disp: 8 tablet, Rfl: 0  •  lidocaine-prilocaine (EMLA) 2.5-2.5 % cream, Apply generous amount to port site 30-45 minutes prior to use and cover with plastic wrap, Disp: 30 g, Rfl: 5  •  metoprolol tartrate (LOPRESSOR) 50 MG tablet, 50 mg., Disp: , Rfl:   •  Morphine (MS CONTIN) 15 MG 12 hr tablet, Take 1 tablet by mouth Every 8 (Eight) Hours., Disp: 90 tablet, Rfl: 0  •  OLANZapine (zyPREXA) 5 MG tablet, Take by mouth on days 2, 3 and 4 after chemotherapy., Disp: 3 tablet, Rfl: 5  •  OLANZapine (zyPREXA) 5 MG tablet, Take 1 tablet by mouth Every Night. Take on days 2, 3 and 4 after chemotherapy., Disp: 3 tablet, Rfl: 5  •  ondansetron (ZOFRAN) 8 MG tablet, Take 1 tablet by mouth Every 8 (Eight) Hours As Needed for Nausea or Vomiting., Disp: 30 tablet, Rfl: 1  •  ondansetron (ZOFRAN) 8 MG tablet, Take 1 tablet by mouth 3 (Three) Times a Day As Needed for Nausea or Vomiting., Disp: 30 tablet, Rfl: 5  •  oxyCODONE (ROXICODONE) 5 MG immediate release tablet, Take 1-2 tabs every 4-6 hours as needed for pain, Disp: 180 tablet, Rfl: 0  •  prochlorperazine (COMPAZINE) 10 MG tablet, Take 1 tablet by mouth Every 6 (Six) Hours As Needed for Nausea or Vomiting., Disp: 60 tablet, Rfl: 1  •  sennosides-docusate (Senna-S) 8.6-50 MG per tablet, Take 2 tablets by mouth 2 (Two) Times a Day., Disp: 120 tablet, Rfl: 5  No current facility-administered medications for this visit.    Facility-Administered Medications Ordered in Other Visits:   •  heparin injection 500 Units, 500 Units, Intravenous, PRN, Jade Mathis MD, 500 Units at 09/29/21 1225  •  sodium chloride 0.9 % flush 10 mL, 10 mL, Intravenous, PRN, Jade Mathis MD, 10 mL at 09/29/21 1225    ALLERGIES:    Allergies   Allergen Reactions   • Lisinopril Anaphylaxis and Rash   • Penicillins Anaphylaxis and Rash       PHYSICAL EXAM:  Vitals:    09/29/21 0832   BP: 113/69   Pulse: 68    Resp: 18   Temp: 98.2 °F (36.8 °C)   SpO2: 98%     Pain Score    09/29/21 0832   PainSc: 0-No pain     General:  Awake, alert and oriented, appears well. In no acute distress.  HEENT:  Pupils are equal, round and reactive to light and accommodation, Extra-ocular movements full, Oropharyx clear, mucous membranes moist  Neck:  No JVD, thyromegaly or lymphadenopathy  CV:  Regular rate and rhythm, no murmurs, rubs or gallops  Resp:  Lungs are clear to auscultation bilaterally, no crackles or wheezes  Abd:  Soft, non-tender, non-distended, bowel sounds present, no organomegaly or masses  Ext:  No clubbing, cyanosis or edema  Lymph:  No cervical, supraclavicular, axillary adenopathy  Neuro: Grossly non-focal exam      PATHOLOGY:  03-17-21 04-20-21 06-14-21          ENDOSCOPY:  Cystoscopy 03-17-21 ([facility]):  Findings: Probable right distal ureteral tumor with nonfunctioning kidney and massive dilation      Cystourethroscopy, TURP 06-14-21 ([facility])  Findings:   1.Left ureteral orifice identified. Retrograde pyelography without hydronephrosis or evidence of filling defect.   2. Large mass pushing into bladder mucosa/external compression- no obvious urothelial papillar lesion. Mass located in the right saad-trigone and right bladder wall. Inability to identify right ureteral orifice  3. Resection of mass with resection size of 10cm x 8 cm.   4. Hemostasis achieved at conclusion of case  5. Placement of 20 F 2 way catheter         IMAGING:  CT Chest 04-12-21  FINDINGS:    LUNGS:  Unremarkable.  No mass.  No consolidation.    PLEURAL SPACE:  Unremarkable.  No pneumothorax.  No significant  effusion.    HEART:  Unremarkable.  No cardiomegaly.  No significant pericardial  effusion.    MEDIASTINUM:  Moderate-sized hiatal hernia.    BONES/JOINTS:  Unremarkable.  No acute fracture.  No dislocation.    SOFT TISSUES:  Unremarkable.    VASCULATURE:  Unremarkable.  No thoracic aortic aneurysm.    LYMPH NODES:   Unremarkable.  No enlarged lymph nodes.    LIVER:  Left lobe of liver cyst measuring 3 cm.    KIDNEYS AND URETERS:  Incidentally imaged right upper kidney shows  moderate hydronephrosis and cortical thinning.     IMPRESSION:    Moderate-sized hiatal hernia.        PET/CT 08-06-21  FINDINGS:      HEAD/NECK:  No FDG hypermetabolic neck adenopathy.  No FDG hypermetabolic masses.     CHEST:   No FDG hypermetabolic thoracic adenopathy.  No FDG hypermetabolic lung nodules or masses.  Evaluation for tiny parenchymal nodules is somewhat limited on low dose  CT secondary to respiratory motion.     ABDOMEN/PELVIS:   The right kidney shows cortical thinning and the right collecting system  is diffusely dilated to the level of the urinary bladder where the wall  is thickened. The left kidney shows diffuse left-sided dilatation as  well.  The pelvis shows heterogeneous soft tissue mass which appears to be  intimate with the prostate. Significantly larger today than on the  previous CT scan.  Large right-sided hydrocele is present.  There is hypermetabolic activity throughout the dilated left ureter as  well as in the left renal pelvis. This likely represents excreted  radiotracer.  Extensive increased activity intermixed in the heterogeneous appearance  of the prostate.     BONES: There are scattered foci of FDG hypermetabolism most suggestive  of degenerative change seen throughout the axial skeleton. If concern  persist for metastatic lesions of the bone, HDP Bone scan is recommended  for further evaluation.     IMPRESSION:     1. Hydronephrosis bilaterally and hydroureter bilaterally to the level  of a thickened urinary bladder.  2. Diffuse increased activity throughout the left collecting system as  well as in the bladder.  3. Heterogeneous soft tissue mass in the pelvis intimate with the  prostate. The periphery does show hypermetabolic activity. Cannot  completely separate this activity from the bladder. Maximum SUV  is  9.656. Urologic follow-up is suggested  4. No other areas of abnormal hypermetabolic activity.      RECENT LABS:  Lab Results   Component Value Date    WBC 3.26 (L) 09/29/2021    HGB 12.0 (L) 09/29/2021    HCT 34.4 (L) 09/29/2021    MCV 90.5 09/29/2021    RDW 17.3 (H) 09/29/2021     09/29/2021    NEUTRORELPCT 75.8 09/08/2021    LYMPHORELPCT 13.0 (L) 09/08/2021    MONORELPCT 7.5 09/08/2021    EOSRELPCT 0.6 09/08/2021    BASORELPCT 1.3 09/08/2021    NEUTROABS 0.65 (L) 09/29/2021    LYMPHSABS 1.32 09/08/2021       Lab Results   Component Value Date     09/29/2021    K 3.7 09/29/2021    CO2 29.1 (H) 09/29/2021     09/29/2021    BUN 11 09/29/2021    CREATININE 1.33 (H) 09/29/2021    EGFRIFNONA 53 (L) 09/29/2021    GLUCOSE 100 (H) 09/29/2021    CALCIUM 9.8 09/29/2021    ALKPHOS 67 09/29/2021    AST 16 09/29/2021    ALT 13 09/29/2021    BILITOT 0.4 09/29/2021    ALBUMIN 4.40 09/29/2021    PROTEINTOT 7.0 09/29/2021     Lab Results   Component Value Date    FERRITIN 823.40 (H) 07/19/2021    IRON 79 07/19/2021    TIBC 305 07/19/2021    LABIRON 26 07/19/2021    DTHGRMNZ89 437 07/19/2021    FOLATE 9.52 07/19/2021       Lab Results   Component Value Date/Time     (H) 08/26/2021 09:23 AM    URICACID 5.3 08/26/2021 09:23 AM         ASSESSMENT & PLAN:  Alycia De La Rosa is a very pleasant 71 y.o. 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    1.  Small Cell Carcinoma of the Bladder:  -  Appears to have localized disease.   -  Clinically has [id number] disease with involvement of the R pelvic sidewall.    -  Given limited stage disease, recommended treatment with Carboplatin/Etoposide followed by either cystectomy or radiation.  Used Carboplatin over Cisplatin given solitary functional kidney.  - He has completed two cycles of carboplatin/etoposide. Overall, he is tolerating the treatment  exceptionally well without any significant side effects.   - Exam and labs from today without cause for concern. Will proceed with cycle three carboplatin/etoposide today as planned.  - Will discontinue Allopurinol today.  - Will repeat CT CAP without contrast (Cr 1.33) prior to cycle 4 carboplatin/etoposide. He will follow up with Dr. Mathis on day #1 of cycle #4.    2.  Neoplasm related pain:  - Previously on Morphine ER 15 mg q8h and Oxycodone 5 mg q4h as needed. He reports that pain has significantly improved and he has only been taking Morphine ER 15 mg daily and Oxycodone 5 mg once a day as needed.   - Advised to avoid Ibuprofen.  - Warned about constipation and recommended use of Senna/Colace ii BID with Miralax as needed if he needs to take narcotics routinely.    3.  Prophlaxis:  He hasn't had Prevnar 13, 2020 influenza or COVID 19 vaccinations.  He says that with PCN allergy (anaphylaxis) he has been very hesitant to get vaccinations.  Explained that none of the vaccines contain PCN.  We again discussed COVID vaccination, but he again declined.    4.  Follow up:   - Discontinue Allopurinol.  - Schedule CT CAP without contrast prior to cycle #4.  - With MD as scheduled on day #1 of cycle #4 carboplatin/etoposide.      ACO / MERLY/Other  Quality measures  -  Alycia De La Rosa did not receive 2021 flu vaccine. This was recommended.  -  Alycia De La Rosa reports a pain score of 0.   -  Current outpatient and discharge medications have been reconciled for the patient.  Reviewed by: BETITO Martinez      I spent 25 minutes with Alycia De La Rosa today.  In the office today, more than 50% of this time was spent face-to-face with him  in counseling / coordination of care, reviewing his medical history and counseling on the current treatment plan.  All questions were answered to his satisfaction      Electronically Signed by: BETITO Martinez      CC:     MD Xavier Garza MD Barbara Michna,  MD

## 2021-09-30 ENCOUNTER — INFUSION (OUTPATIENT)
Dept: ONCOLOGY | Facility: HOSPITAL | Age: 71
End: 2021-09-30

## 2021-09-30 VITALS
RESPIRATION RATE: 18 BRPM | HEART RATE: 78 BPM | SYSTOLIC BLOOD PRESSURE: 109 MMHG | OXYGEN SATURATION: 96 % | BODY MASS INDEX: 24.28 KG/M2 | TEMPERATURE: 98.6 F | WEIGHT: 155 LBS | DIASTOLIC BLOOD PRESSURE: 60 MMHG

## 2021-09-30 DIAGNOSIS — D70.1 CHEMOTHERAPY INDUCED NEUTROPENIA (HCC): ICD-10-CM

## 2021-09-30 DIAGNOSIS — C66.1 URETERAL CARCINOMA, RIGHT (HCC): ICD-10-CM

## 2021-09-30 DIAGNOSIS — T45.1X5A CHEMOTHERAPY INDUCED NEUTROPENIA (HCC): ICD-10-CM

## 2021-09-30 DIAGNOSIS — C67.9 SMALL CELL CARCINOMA OF BLADDER (HCC): ICD-10-CM

## 2021-09-30 DIAGNOSIS — D70.1 CHEMOTHERAPY INDUCED NEUTROPENIA (HCC): Primary | ICD-10-CM

## 2021-09-30 DIAGNOSIS — T45.1X5A CHEMOTHERAPY INDUCED NEUTROPENIA (HCC): Primary | ICD-10-CM

## 2021-09-30 PROCEDURE — 25010000002 ETOPOSIDE 500 MG/25ML SOLUTION 25 ML VIAL: Performed by: INTERNAL MEDICINE

## 2021-09-30 PROCEDURE — 25010000002 HEPARIN LOCK FLUSH PER 10 UNITS: Performed by: INTERNAL MEDICINE

## 2021-09-30 PROCEDURE — 96413 CHEMO IV INFUSION 1 HR: CPT

## 2021-09-30 PROCEDURE — 25010000002 DEXAMETHASONE SODIUM PHOSPHATE 20 MG/5ML SOLUTION: Performed by: INTERNAL MEDICINE

## 2021-09-30 PROCEDURE — 96375 TX/PRO/DX INJ NEW DRUG ADDON: CPT

## 2021-09-30 RX ORDER — HEPARIN SODIUM (PORCINE) LOCK FLUSH IV SOLN 100 UNIT/ML 100 UNIT/ML
500 SOLUTION INTRAVENOUS AS NEEDED
Status: CANCELLED | OUTPATIENT
Start: 2021-10-01

## 2021-09-30 RX ORDER — SODIUM CHLORIDE 0.9 % (FLUSH) 0.9 %
10 SYRINGE (ML) INJECTION AS NEEDED
Status: CANCELLED | OUTPATIENT
Start: 2021-10-01

## 2021-09-30 RX ORDER — SODIUM CHLORIDE 9 MG/ML
250 INJECTION, SOLUTION INTRAVENOUS ONCE
Status: COMPLETED | OUTPATIENT
Start: 2021-09-30 | End: 2021-09-30

## 2021-09-30 RX ORDER — HEPARIN SODIUM (PORCINE) LOCK FLUSH IV SOLN 100 UNIT/ML 100 UNIT/ML
500 SOLUTION INTRAVENOUS AS NEEDED
Status: DISCONTINUED | OUTPATIENT
Start: 2021-09-30 | End: 2021-09-30 | Stop reason: HOSPADM

## 2021-09-30 RX ADMIN — SODIUM CHLORIDE, PRESERVATIVE FREE 500 UNITS: 5 INJECTION INTRAVENOUS at 12:45

## 2021-09-30 RX ADMIN — ETOPOSIDE 180 MG: 20 INJECTION, SOLUTION, CONCENTRATE INTRAVENOUS at 11:30

## 2021-09-30 RX ADMIN — SODIUM CHLORIDE 250 ML: 9 INJECTION, SOLUTION INTRAVENOUS at 11:10

## 2021-09-30 RX ADMIN — DEXAMETHASONE SODIUM PHOSPHATE 12 MG: 4 INJECTION, SOLUTION INTRAMUSCULAR; INTRAVENOUS at 11:10

## 2021-10-01 ENCOUNTER — INFUSION (OUTPATIENT)
Dept: ONCOLOGY | Facility: HOSPITAL | Age: 71
End: 2021-10-01

## 2021-10-01 VITALS
RESPIRATION RATE: 18 BRPM | DIASTOLIC BLOOD PRESSURE: 67 MMHG | BODY MASS INDEX: 24.32 KG/M2 | OXYGEN SATURATION: 99 % | HEART RATE: 65 BPM | TEMPERATURE: 97.3 F | SYSTOLIC BLOOD PRESSURE: 116 MMHG | WEIGHT: 155.3 LBS

## 2021-10-01 DIAGNOSIS — C67.9 SMALL CELL CARCINOMA OF BLADDER (HCC): ICD-10-CM

## 2021-10-01 DIAGNOSIS — D70.1 CHEMOTHERAPY INDUCED NEUTROPENIA (HCC): Primary | ICD-10-CM

## 2021-10-01 DIAGNOSIS — T45.1X5A CHEMOTHERAPY INDUCED NEUTROPENIA (HCC): Primary | ICD-10-CM

## 2021-10-01 DIAGNOSIS — C66.1 URETERAL CARCINOMA, RIGHT (HCC): ICD-10-CM

## 2021-10-01 PROCEDURE — 96413 CHEMO IV INFUSION 1 HR: CPT

## 2021-10-01 PROCEDURE — 25010000002 HEPARIN LOCK FLUSH PER 10 UNITS: Performed by: INTERNAL MEDICINE

## 2021-10-01 PROCEDURE — 25010000002 DEXAMETHASONE SODIUM PHOSPHATE 20 MG/5ML SOLUTION: Performed by: INTERNAL MEDICINE

## 2021-10-01 PROCEDURE — 96377 APPLICATON ON-BODY INJECTOR: CPT

## 2021-10-01 PROCEDURE — 96375 TX/PRO/DX INJ NEW DRUG ADDON: CPT

## 2021-10-01 PROCEDURE — 25010000002 ETOPOSIDE 500 MG/25ML SOLUTION 25 ML VIAL: Performed by: INTERNAL MEDICINE

## 2021-10-01 PROCEDURE — 25010000002 PEGFILGRASTIM 6 MG/0.6ML PREFILLED SYRINGE KIT: Performed by: INTERNAL MEDICINE

## 2021-10-01 RX ORDER — SODIUM CHLORIDE 9 MG/ML
250 INJECTION, SOLUTION INTRAVENOUS ONCE
Status: COMPLETED | OUTPATIENT
Start: 2021-10-01 | End: 2021-10-01

## 2021-10-01 RX ORDER — SODIUM CHLORIDE 0.9 % (FLUSH) 0.9 %
10 SYRINGE (ML) INJECTION AS NEEDED
Status: DISCONTINUED | OUTPATIENT
Start: 2021-10-01 | End: 2021-10-01 | Stop reason: HOSPADM

## 2021-10-01 RX ORDER — SODIUM CHLORIDE 0.9 % (FLUSH) 0.9 %
10 SYRINGE (ML) INJECTION AS NEEDED
Status: CANCELLED | OUTPATIENT
Start: 2021-10-20

## 2021-10-01 RX ORDER — HEPARIN SODIUM (PORCINE) LOCK FLUSH IV SOLN 100 UNIT/ML 100 UNIT/ML
500 SOLUTION INTRAVENOUS AS NEEDED
Status: DISCONTINUED | OUTPATIENT
Start: 2021-10-01 | End: 2021-10-01 | Stop reason: HOSPADM

## 2021-10-01 RX ORDER — HEPARIN SODIUM (PORCINE) LOCK FLUSH IV SOLN 100 UNIT/ML 100 UNIT/ML
500 SOLUTION INTRAVENOUS AS NEEDED
Status: CANCELLED | OUTPATIENT
Start: 2021-10-20

## 2021-10-01 RX ORDER — LEVOFLOXACIN 500 MG/1
500 TABLET, FILM COATED ORAL DAILY
Qty: 10 TABLET | Refills: 0 | Status: SHIPPED | OUTPATIENT
Start: 2021-10-01 | End: 2021-10-11

## 2021-10-01 RX ADMIN — SODIUM CHLORIDE 250 ML: 9 INJECTION, SOLUTION INTRAVENOUS at 09:54

## 2021-10-01 RX ADMIN — DEXAMETHASONE SODIUM PHOSPHATE 12 MG: 4 INJECTION, SOLUTION INTRAMUSCULAR; INTRAVENOUS at 09:57

## 2021-10-01 RX ADMIN — ETOPOSIDE 180 MG: 20 INJECTION, SOLUTION, CONCENTRATE INTRAVENOUS at 10:18

## 2021-10-01 RX ADMIN — SODIUM CHLORIDE, PRESERVATIVE FREE 10 ML: 5 INJECTION INTRAVENOUS at 11:41

## 2021-10-01 RX ADMIN — Medication 500 UNITS: at 11:41

## 2021-10-01 RX ADMIN — PEGFILGRASTIM 6 MG: KIT SUBCUTANEOUS at 11:49

## 2021-10-08 ENCOUNTER — LAB (OUTPATIENT)
Dept: ONCOLOGY | Facility: CLINIC | Age: 71
End: 2021-10-08

## 2021-10-08 VITALS
WEIGHT: 151.6 LBS | RESPIRATION RATE: 18 BRPM | OXYGEN SATURATION: 98 % | TEMPERATURE: 97.8 F | BODY MASS INDEX: 23.74 KG/M2 | HEART RATE: 76 BPM | SYSTOLIC BLOOD PRESSURE: 110 MMHG | DIASTOLIC BLOOD PRESSURE: 66 MMHG

## 2021-10-08 DIAGNOSIS — C67.9 SMALL CELL CARCINOMA OF BLADDER (HCC): ICD-10-CM

## 2021-10-08 LAB
ALBUMIN SERPL-MCNC: 4.15 G/DL (ref 3.5–5.2)
ALBUMIN/GLOB SERPL: 1.8 G/DL
ALP SERPL-CCNC: 92 U/L (ref 39–117)
ALT SERPL W P-5'-P-CCNC: 15 U/L (ref 1–41)
ANION GAP SERPL CALCULATED.3IONS-SCNC: 8.6 MMOL/L (ref 5–15)
ANISOCYTOSIS BLD QL: ABNORMAL
AST SERPL-CCNC: 12 U/L (ref 1–40)
BASOPHILS # BLD MANUAL: 0.03 10*3/MM3 (ref 0–0.2)
BASOPHILS NFR BLD AUTO: 1 % (ref 0–1.5)
BILIRUB SERPL-MCNC: 0.4 MG/DL (ref 0–1.2)
BUN SERPL-MCNC: 20 MG/DL (ref 8–23)
BUN/CREAT SERPL: 15.9 (ref 7–25)
CALCIUM SPEC-SCNC: 9.5 MG/DL (ref 8.6–10.5)
CHLORIDE SERPL-SCNC: 95 MMOL/L (ref 98–107)
CO2 SERPL-SCNC: 30.4 MMOL/L (ref 22–29)
CREAT SERPL-MCNC: 1.26 MG/DL (ref 0.76–1.27)
DEPRECATED RDW RBC AUTO: 51.3 FL (ref 37–54)
EOSINOPHIL # BLD MANUAL: 0.03 10*3/MM3 (ref 0–0.4)
EOSINOPHIL NFR BLD MANUAL: 1 % (ref 0.3–6.2)
ERYTHROCYTE [DISTWIDTH] IN BLOOD BY AUTOMATED COUNT: 15.7 % (ref 12.3–15.4)
GFR SERPL CREATININE-BSD FRML MDRD: 56 ML/MIN/1.73
GLOBULIN UR ELPH-MCNC: 2.4 GM/DL
GLUCOSE SERPL-MCNC: 129 MG/DL (ref 65–99)
HCT VFR BLD AUTO: 29.2 % (ref 37.5–51)
HGB BLD-MCNC: 10.1 G/DL (ref 13–17.7)
LYMPHOCYTES # BLD MANUAL: 1.19 10*3/MM3 (ref 0.7–3.1)
LYMPHOCYTES NFR BLD MANUAL: 13 % (ref 5–12)
LYMPHOCYTES NFR BLD MANUAL: 37 % (ref 19.6–45.3)
MCH RBC QN AUTO: 31.4 PG (ref 26.6–33)
MCHC RBC AUTO-ENTMCNC: 34.6 G/DL (ref 31.5–35.7)
MCV RBC AUTO: 90.7 FL (ref 79–97)
MONOCYTES # BLD AUTO: 0.42 10*3/MM3 (ref 0.1–0.9)
NEUTROPHILS # BLD AUTO: 1.55 10*3/MM3 (ref 1.7–7)
NEUTROPHILS NFR BLD MANUAL: 40 % (ref 42.7–76)
NEUTS BAND NFR BLD MANUAL: 8 % (ref 0–5)
PLATELET # BLD AUTO: 73 10*3/MM3 (ref 140–450)
PMV BLD AUTO: 9.6 FL (ref 6–12)
POTASSIUM SERPL-SCNC: 3.5 MMOL/L (ref 3.5–5.2)
PROT SERPL-MCNC: 6.5 G/DL (ref 6–8.5)
RBC # BLD AUTO: 3.22 10*6/MM3 (ref 4.14–5.8)
SCAN SLIDE: NORMAL
SMALL PLATELETS BLD QL SMEAR: ABNORMAL
SODIUM SERPL-SCNC: 134 MMOL/L (ref 136–145)
WBC # BLD AUTO: 3.22 10*3/MM3 (ref 3.4–10.8)

## 2021-10-08 PROCEDURE — 80053 COMPREHEN METABOLIC PANEL: CPT | Performed by: INTERNAL MEDICINE

## 2021-10-08 PROCEDURE — 85025 COMPLETE CBC W/AUTO DIFF WBC: CPT | Performed by: INTERNAL MEDICINE

## 2021-10-08 PROCEDURE — 85007 BL SMEAR W/DIFF WBC COUNT: CPT | Performed by: INTERNAL MEDICINE

## 2021-10-18 ENCOUNTER — HOSPITAL ENCOUNTER (OUTPATIENT)
Dept: CT IMAGING | Facility: HOSPITAL | Age: 71
Discharge: HOME | End: 2021-10-18

## 2021-10-18 DIAGNOSIS — C67.9 SMALL CELL CARCINOMA OF BLADDER (HCC): ICD-10-CM

## 2021-10-18 PROCEDURE — 74176 CT ABD & PELVIS W/O CONTRAST: CPT | Performed by: RADIOLOGY

## 2021-10-18 PROCEDURE — 71250 CT THORAX DX C-: CPT

## 2021-10-18 PROCEDURE — 71250 CT THORAX DX C-: CPT | Performed by: RADIOLOGY

## 2021-10-18 PROCEDURE — 74176 CT ABD & PELVIS W/O CONTRAST: CPT

## 2021-10-19 NOTE — PROGRESS NOTES
NAME: Alycia De La Rosa    : 1950    DATE OF FOLLOW UP:  10/20/2021    DIAGNOSIS:   Small cell bladder cancer, Limited stage disease    TREATMENT HISTORY:  1.         CHIEF COMPLAINT:  Follow up Bladder cancer      HISTORY OF PRESENT ILLNESS:   Alycia De La Rosa is a very pleasant 71 y.o. male who is being seen today at the request of Dr. Xavier Arnold for evaluation and treatment of small cell bladder cancer. He first developed gross hematuria in 2021 and was seen by his PCP Dr. Alec Soto.  He had imaging revealing hydronephrosis due to obstruction of the R ureteral orifice with non-functioning R kidney and was referred to Dr. Link who performed cystoscopy with biopsy on 3-17-21.  Cystoscopy revealed a normal urethra nd prostate with indented R ureteral orfice with obvious tumor behind it.  He performed transurethral resection until obvious ureter and sent tumor for pathologic examination.  This showed high grade urothelial carcinoma .  Muscularis propria was present but uninvolved.  Dr. Link referred him to Dr. Sylvester for consideration of R nephrectomy / ureterectomy.  Mr. De La Rosa was referred to Dr. Sylvester who performed repeat cystoscopy 21.  This revealed a large mass pushing into the bladder mucosa / external compression with mass in the R saad-trigone and R bladder wall.  R ureteral orifice could not be identified.  A 10x8 cm mass was resected.  PAthology showed small cell carcinoma with invasion of the muscularis propria as well as focal conventional high grade urothelial carcinoma.  Dr. Sylvester referred him to Dr. Xavier Arnold given small cell histology.  Dr. Lott recommended imaging for staging and treatment with Carbo/Etoposide/Atezolizumab. He was referred here because he preferred to receive treatment close to home.    Mr. De La Rosa presents today with his daughter (who works with Dr. Otoole).  He is overall doing well.  He has had 2 episodes of gross hematuria in total, but this hasn't been a  persistent problem.  He denies weight loss.  No chest pain or shortness of breath. He complains of RLQ pelvic pain which radiates into his R hip/ R upper thigh.  He has been using Tylenol for pain which he says is sufficient, but he is clearly in pain in the office today and is unable to sit in the chair because of pain.         INTERVAL HISTORY:   Mr. De La Rosa is here today for follow up of bladder cancer and cycle 4 of Carboplatin/Etoposide. He reports tolerating his treatment well and denies any complaints today. He had restaging imaging and is anxious to discuss this today.      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       PAST SURGICAL HISTORY:  Past Surgical History:   Procedure Laterality Date   • CYSTOSCOPY RETROGRADE PYELOGRAM Right 3/17/2021    Procedure: CYSTOSCOPY RETROGRADE PYELOGRAM;  Surgeon: Dennis Link MD;  Location: [facility];  Service: Urology;  Laterality: Right;   • CYSTOSCOPY URETEROSCOPY Bilateral 3/17/2021    Procedure: CYSTOSCOPY TRANSURETHRAL RESECTION OF BLADDER TUMOR;  Surgeon: Dennis Link MD;  Location: [facility];  Service: Urology;  Laterality: Bilateral;   • HYDROCELE EXCISION / REPAIR      x2   • VENOUS ACCESS DEVICE (PORT) INSERTION Left 8/4/2021    Procedure: INSERTION VENOUS ACCESS DEVICE;  Surgeon: Katy Cool MD;  Location: [facility];  Service: General;  Laterality: Left;       FAMILY HISTORY:  Family History   Problem Relation Age of Onset   • Cancer Father 84        prostate   • Cancer Mother 73        mouth   • Cancer Maternal Aunt         mouth   • Cancer Maternal Grandfather          SOCIAL HISTORY:  Social History     Socioeconomic History   • Marital  status:    Tobacco Use   • Smoking status: Former Smoker     Packs/day: 3.00     Years: 5.00     Pack years: 15.00     Types: Cigarettes     Quit date:      Years since quittin.8   • Smokeless tobacco: Former User   Vaping Use   • Vaping Use: Never used   Substance and Sexual Activity   • Alcohol use: Not Currently   • Drug use: Never   • Sexual activity: Defer     Social History     Social History Narrative    He used to work as a  and denies any unusual chemical exposures.        REVIEW OF SYSTEMS:   A comprehensive 14 point review of systems was performed.  Significant findings as mentioned above.  All other systems reviewed and are negative.      MEDICATIONS:  The current medication list was reviewed in the EMR    Current Outpatient Medications:   •  amLODIPine (NORVASC) 10 MG tablet, 10 mg., Disp: , Rfl:   •  hydroCHLOROthiazide (HYDRODIURIL) 12.5 MG tablet, 12.5 mg., Disp: , Rfl:   •  HYDROcodone-acetaminophen (NORCO)  MG per tablet, Take 1 tablet by mouth Every 4 (Four) Hours As Needed for Moderate Pain, Disp: 12 tablet, Rfl: 0  •  HYDROcodone-acetaminophen (Norco) 7.5-325 MG per tablet, Take 1 tablet by mouth 4 (Four) Times a Day As Needed for Moderate Pain ., Disp: 8 tablet, Rfl: 0  •  lidocaine-prilocaine (EMLA) 2.5-2.5 % cream, Apply generous amount to port site 30-45 minutes prior to use and cover with plastic wrap, Disp: 30 g, Rfl: 5  •  metoprolol tartrate (LOPRESSOR) 50 MG tablet, 50 mg., Disp: , Rfl:   •  Morphine (MS CONTIN) 15 MG 12 hr tablet, Take 1 tablet by mouth Every 8 (Eight) Hours., Disp: 90 tablet, Rfl: 0  •  OLANZapine (zyPREXA) 5 MG tablet, Take by mouth on days 2, 3 and 4 after chemotherapy., Disp: 3 tablet, Rfl: 5  •  OLANZapine (zyPREXA) 5 MG tablet, Take 1 tablet by mouth Every Night. Take on days 2, 3 and 4 after chemotherapy., Disp: 3 tablet, Rfl: 5  •  ondansetron (ZOFRAN) 8 MG tablet, Take 1 tablet by mouth Every 8 (Eight) Hours As Needed for  Nausea or Vomiting., Disp: 30 tablet, Rfl: 1  •  ondansetron (ZOFRAN) 8 MG tablet, Take 1 tablet by mouth 3 (Three) Times a Day As Needed for Nausea or Vomiting., Disp: 30 tablet, Rfl: 5  •  oxyCODONE (ROXICODONE) 5 MG immediate release tablet, Take 1-2 tabs every 4-6 hours as needed for pain, Disp: 180 tablet, Rfl: 0  •  prochlorperazine (COMPAZINE) 10 MG tablet, Take 1 tablet by mouth Every 6 (Six) Hours As Needed for Nausea or Vomiting., Disp: 60 tablet, Rfl: 1  •  sennosides-docusate (Senna-S) 8.6-50 MG per tablet, Take 2 tablets by mouth 2 (Two) Times a Day., Disp: 120 tablet, Rfl: 5    ALLERGIES:    Allergies   Allergen Reactions   • Lisinopril Anaphylaxis and Rash   • Penicillins Anaphylaxis and Rash       PHYSICAL EXAM:  Vitals:    10/20/21 0928   BP: 116/69   Pulse: 70   Resp: 20   Temp: 97.1 °F (36.2 °C)   SpO2: 100%     Pain Score    10/20/21 0928   PainSc: 0-No pain   ECOG score: 0     General:  Awake, alert and oriented, appears well. In no acute distress.  HEENT:  Pupils are equal, round and reactive to light and accommodation, Extra-ocular movements full, Oropharyx clear, mucous membranes moist  Neck:  No JVD, thyromegaly or lymphadenopathy  CV:  Regular rate and rhythm, no murmurs, rubs or gallops  Resp:  Lungs are clear to auscultation bilaterally, no rhonchi  Abd:  Soft, non-tender, non-distended, bowel sounds present, no organomegaly or masses  Ext:  No clubbing, cyanosis or edema  Lymph:  No cervical, supraclavicular, axillary adenopathy  Neuro: Grossly non-focal exam      PATHOLOGY:  03-17-21 04-20-21 06-14-21          ENDOSCOPY:  Cystoscopy 03-17-21 ([facility]):  Findings: Probable right distal ureteral tumor with nonfunctioning kidney and massive dilation      Cystourethroscopy, TURP 06-14-21 ([facility])  Findings:   1.Left ureteral orifice identified. Retrograde pyelography without hydronephrosis or evidence of filling defect.   2. Large mass pushing into bladder mucosa/external  compression- no obvious urothelial papillar lesion. Mass located in the right saad-trigone and right bladder wall. Inability to identify right ureteral orifice  3. Resection of mass with resection size of 10cm x 8 cm.   4. Hemostasis achieved at conclusion of case  5. Placement of 20 F 2 way catheter         IMAGING:  CT Chest 04-12-21  FINDINGS:    LUNGS:  Unremarkable.  No mass.  No consolidation.    PLEURAL SPACE:  Unremarkable.  No pneumothorax.  No significant  effusion.    HEART:  Unremarkable.  No cardiomegaly.  No significant pericardial  effusion.    MEDIASTINUM:  Moderate-sized hiatal hernia.    BONES/JOINTS:  Unremarkable.  No acute fracture.  No dislocation.    SOFT TISSUES:  Unremarkable.    VASCULATURE:  Unremarkable.  No thoracic aortic aneurysm.    LYMPH NODES:  Unremarkable.  No enlarged lymph nodes.    LIVER:  Left lobe of liver cyst measuring 3 cm.    KIDNEYS AND URETERS:  Incidentally imaged right upper kidney shows  moderate hydronephrosis and cortical thinning.     IMPRESSION:    Moderate-sized hiatal hernia.        PET/CT 08-06-21  FINDINGS:      HEAD/NECK:  No FDG hypermetabolic neck adenopathy.  No FDG hypermetabolic masses.     CHEST:   No FDG hypermetabolic thoracic adenopathy.  No FDG hypermetabolic lung nodules or masses.  Evaluation for tiny parenchymal nodules is somewhat limited on low dose  CT secondary to respiratory motion.     ABDOMEN/PELVIS:   The right kidney shows cortical thinning and the right collecting system  is diffusely dilated to the level of the urinary bladder where the wall  is thickened. The left kidney shows diffuse left-sided dilatation as  well.  The pelvis shows heterogeneous soft tissue mass which appears to be  intimate with the prostate. Significantly larger today than on the  previous CT scan.  Large right-sided hydrocele is present.  There is hypermetabolic activity throughout the dilated left ureter as  well as in the left renal pelvis. This likely  represents excreted  radiotracer.  Extensive increased activity intermixed in the heterogeneous appearance  of the prostate.     BONES: There are scattered foci of FDG hypermetabolism most suggestive  of degenerative change seen throughout the axial skeleton. If concern  persist for metastatic lesions of the bone, HDP Bone scan is recommended  for further evaluation.     IMPRESSION:     1. Hydronephrosis bilaterally and hydroureter bilaterally to the level  of a thickened urinary bladder.  2. Diffuse increased activity throughout the left collecting system as  well as in the bladder.  3. Heterogeneous soft tissue mass in the pelvis intimate with the  prostate. The periphery does show hypermetabolic activity. Cannot  completely separate this activity from the bladder. Maximum SUV is  9.656. Urologic follow-up is suggested  4. No other areas of abnormal hypermetabolic activity.      CTCAP 10-18-21  CT FINDINGS: The lungs are generally hyperinflated. No pulmonary  parenchymal lung nodules or masses are identified. There were no pleural  effusions or inflammatory infiltrates. No masses or enlarged lymph nodes  are noted in the mediastinum or hilar areas.     IMPRESSION:  No CT findings to suggest metastatic disease.       FINDINGS: The liver was remarkable for several hepatic cysts that are  stable in comparing with the previous exam. No focal abnormalities were  seen in the spleen. The pancreas was unremarkable. The kidneys show  marked hydronephrosis involving the collecting system of the right  kidney with marked thinning of the cortical tissue. The right ureter is  also dilated. There is a mass in the area of the trigone of the bladder  that extends into the soft tissues of the retroperitoneum on the right  side. This is concerning for a large bladder tumor. The collecting  system of the left kidney was unremarkable. The aorta is normal in  caliber. There were no enlarged lymph nodes in the retroperitoneum  or  mesentery. The bowel shows no evidence of obstruction. There were no  ventral hernias.     IMPRESSION:  Marked hydronephrosis and hydroureter involving the right  kidney, felt to be due to long-standing obstruction. There is diffuse  thinning of the cortical tissue. There is a large mass in the bladder  base on the right side with calcifications extending outside the bladder  into the pelvic soft tissues. The left kidney was unremarkable.      RECENT LABS:  Lab Results   Component Value Date    WBC 7.95 10/20/2021    HGB 11.8 (L) 10/20/2021    HCT 34.4 (L) 10/20/2021    MCV 95.0 10/20/2021    RDW 18.0 (H) 10/20/2021     10/20/2021    NEUTRORELPCT 64.4 10/20/2021    LYMPHORELPCT 20.1 10/20/2021    MONORELPCT 11.2 10/20/2021    EOSRELPCT 0.9 10/20/2021    BASORELPCT 1.3 10/20/2021    NEUTROABS 5.12 10/20/2021    LYMPHSABS 1.60 10/20/2021       Lab Results   Component Value Date     10/20/2021    K 4.2 10/20/2021    CO2 32.6 (H) 10/20/2021     10/20/2021    BUN 18 10/20/2021    CREATININE 1.34 (H) 10/20/2021    EGFRIFNONA 52 (L) 10/20/2021    GLUCOSE 106 (H) 10/20/2021    CALCIUM 10.2 10/20/2021    ALKPHOS 75 10/20/2021    AST 21 10/20/2021    ALT 16 10/20/2021    BILITOT 0.5 10/20/2021    ALBUMIN 4.47 10/20/2021    PROTEINTOT 7.0 10/20/2021     Lab Results   Component Value Date    FERRITIN 823.40 (H) 07/19/2021    IRON 79 07/19/2021    TIBC 305 07/19/2021    LABIRON 26 07/19/2021    JCUBUZCA11 437 07/19/2021    FOLATE 9.52 07/19/2021       Lab Results   Component Value Date/Time     (H) 08/26/2021 09:23 AM    URICACID 5.3 08/26/2021 09:23 AM         ASSESSMENT & PLAN:  Alycia De La Rosa is a very pleasant 71 y.o. 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    1.  Small Cell Carcinoma of the Bladder:  -  Appears to have localized disease.   -  Clinically has [id number]  disease with involvement of the R pelvic sidewall.    -  Given limited stage disease, recommended treatment with Carboplatin/Etoposide followed by either cystectomy or radiation.  Used Carboplatin over Cisplatin given solitary functional kidney.  - He has completed two cycles of carboplatin/etoposide. Overall, he is tolerating the treatment exceptionally well without any significant side effects.   - Exam and labs from today without cause for concern. After 3 cycles carboplatin/etoposide imaging shows a significant response to treatment.  I reviewed images personally and with Dr. Mejia today. He previously had fabi hydronephrosis and now the L ureter/kidney are free / unobstructed.  The mass looks about 1/3rd the size of its pre-treatment value. That said, there is still extension of the mass beyond the bladder.  - I have recommended that we continue with C4 treatment today as planned but then extend treatment to a total of 6 cycles of chemotherapy.  He is tolerating the therapy extremely well and is responding well.  I think if we maximize this effect, this should optimize him as best we can for either surgery or radiation.  -  He is happy with this plan.  -  Will tentatively plan to complete 6 cycles and then reimage and send him at that point back to Dr. Sylvester and to Dr. Harry for consideration of surgical resection v. Radiation..    2.  Neoplasm related pain:  - Previously on Morphine ER 15 mg q8h and Oxycodone 5 mg q4h as needed. He reports that pain has now resolved.    3.  Prophlaxis:  He hasn't had Prevnar 13, 2021 influenza or COVID 19 vaccinations.  He says that with PCN allergy (anaphylaxis) he has been very hesitant to get vaccinations.  Explained that none of the vaccines contain PCN.  We again discussed vaccination, but he again declined.    4.  Follow up:   - Continue w/ C4 Carbo/Etoposide today.  -  Plan for 6 cycles of treatment followed by restaging imaging and consultation with Urology and Radiation  Oncology  -  RTC w/ C5 Carbo/Etoposide for toxicity check    5.  ACO / MERLY/Other  Quality measures  -  Alycia De La Rosa did not receive 2021 flu vaccine. This was recommended but declined.  -  Alycia De La Rosa reports a pain score of 0.   -  Current outpatient and discharge medications have been reconciled for the patient.  Reviewed by: Jade Mathis MD     This note was scribed for Jade Mathis MD by Ana Harper RN.    I, Jade Mathis MD, personally performed the services described in this documentation as scribed by the above named individual in my presence, and it is both accurate and complete.  10/20/2021       I spent 30 minutes with Alycia De La Rosa today.  In the office today, more than 50% of this time was spent face-to-face with him  in counseling / coordination of care, reviewing his medical history and counseling on the current treatment plan.  All questions were answered to his satisfaction      Electronically Signed by: Jade Mathis MD       CC:     MD Xavier Garza MD Barbara Michna, MD Stephen E. Strup, MD

## 2021-10-20 ENCOUNTER — OFFICE VISIT (OUTPATIENT)
Dept: ONCOLOGY | Facility: CLINIC | Age: 71
End: 2021-10-20

## 2021-10-20 ENCOUNTER — LAB (OUTPATIENT)
Dept: ONCOLOGY | Facility: CLINIC | Age: 71
End: 2021-10-20

## 2021-10-20 ENCOUNTER — INFUSION (OUTPATIENT)
Dept: ONCOLOGY | Facility: HOSPITAL | Age: 71
End: 2021-10-20

## 2021-10-20 VITALS
WEIGHT: 153.2 LBS | RESPIRATION RATE: 20 BRPM | DIASTOLIC BLOOD PRESSURE: 69 MMHG | SYSTOLIC BLOOD PRESSURE: 116 MMHG | OXYGEN SATURATION: 100 % | BODY MASS INDEX: 23.99 KG/M2 | HEART RATE: 70 BPM | TEMPERATURE: 97.1 F

## 2021-10-20 VITALS
BODY MASS INDEX: 23.99 KG/M2 | HEART RATE: 70 BPM | SYSTOLIC BLOOD PRESSURE: 116 MMHG | RESPIRATION RATE: 20 BRPM | DIASTOLIC BLOOD PRESSURE: 69 MMHG | TEMPERATURE: 97.1 F | WEIGHT: 153.2 LBS | OXYGEN SATURATION: 100 %

## 2021-10-20 DIAGNOSIS — C66.1 URETERAL CARCINOMA, RIGHT (HCC): ICD-10-CM

## 2021-10-20 DIAGNOSIS — D70.1 CHEMOTHERAPY INDUCED NEUTROPENIA (HCC): Primary | ICD-10-CM

## 2021-10-20 DIAGNOSIS — C67.9 SMALL CELL CARCINOMA OF BLADDER (HCC): Primary | ICD-10-CM

## 2021-10-20 DIAGNOSIS — C67.9 SMALL CELL CARCINOMA OF BLADDER (HCC): ICD-10-CM

## 2021-10-20 DIAGNOSIS — T45.1X5A CHEMOTHERAPY INDUCED NEUTROPENIA (HCC): Primary | ICD-10-CM

## 2021-10-20 LAB
ALBUMIN SERPL-MCNC: 4.47 G/DL (ref 3.5–5.2)
ALBUMIN/GLOB SERPL: 1.8 G/DL
ALP SERPL-CCNC: 75 U/L (ref 39–117)
ALT SERPL W P-5'-P-CCNC: 16 U/L (ref 1–41)
ANION GAP SERPL CALCULATED.3IONS-SCNC: 8.4 MMOL/L (ref 5–15)
AST SERPL-CCNC: 21 U/L (ref 1–40)
BASOPHILS # BLD AUTO: 0.1 10*3/MM3 (ref 0–0.2)
BASOPHILS NFR BLD AUTO: 1.3 % (ref 0–1.5)
BILIRUB SERPL-MCNC: 0.5 MG/DL (ref 0–1.2)
BUN SERPL-MCNC: 18 MG/DL (ref 8–23)
BUN/CREAT SERPL: 13.4 (ref 7–25)
CALCIUM SPEC-SCNC: 10.2 MG/DL (ref 8.6–10.5)
CHLORIDE SERPL-SCNC: 100 MMOL/L (ref 98–107)
CO2 SERPL-SCNC: 32.6 MMOL/L (ref 22–29)
CREAT SERPL-MCNC: 1.34 MG/DL (ref 0.76–1.27)
DEPRECATED RDW RBC AUTO: 61.6 FL (ref 37–54)
EOSINOPHIL # BLD AUTO: 0.07 10*3/MM3 (ref 0–0.4)
EOSINOPHIL NFR BLD AUTO: 0.9 % (ref 0.3–6.2)
ERYTHROCYTE [DISTWIDTH] IN BLOOD BY AUTOMATED COUNT: 18 % (ref 12.3–15.4)
GFR SERPL CREATININE-BSD FRML MDRD: 52 ML/MIN/1.73
GLOBULIN UR ELPH-MCNC: 2.5 GM/DL
GLUCOSE SERPL-MCNC: 106 MG/DL (ref 65–99)
HCT VFR BLD AUTO: 34.4 % (ref 37.5–51)
HGB BLD-MCNC: 11.8 G/DL (ref 13–17.7)
IMM GRANULOCYTES # BLD AUTO: 0.17 10*3/MM3 (ref 0–0.05)
IMM GRANULOCYTES NFR BLD AUTO: 2.1 % (ref 0–0.5)
LYMPHOCYTES # BLD AUTO: 1.6 10*3/MM3 (ref 0.7–3.1)
LYMPHOCYTES NFR BLD AUTO: 20.1 % (ref 19.6–45.3)
MCH RBC QN AUTO: 32.6 PG (ref 26.6–33)
MCHC RBC AUTO-ENTMCNC: 34.3 G/DL (ref 31.5–35.7)
MCV RBC AUTO: 95 FL (ref 79–97)
MONOCYTES # BLD AUTO: 0.89 10*3/MM3 (ref 0.1–0.9)
MONOCYTES NFR BLD AUTO: 11.2 % (ref 5–12)
NEUTROPHILS NFR BLD AUTO: 5.12 10*3/MM3 (ref 1.7–7)
NEUTROPHILS NFR BLD AUTO: 64.4 % (ref 42.7–76)
NRBC BLD AUTO-RTO: 0.3 /100 WBC (ref 0–0.2)
PLATELET # BLD AUTO: 236 10*3/MM3 (ref 140–450)
PMV BLD AUTO: 8.9 FL (ref 6–12)
POTASSIUM SERPL-SCNC: 4.2 MMOL/L (ref 3.5–5.2)
PROT SERPL-MCNC: 7 G/DL (ref 6–8.5)
RBC # BLD AUTO: 3.62 10*6/MM3 (ref 4.14–5.8)
SODIUM SERPL-SCNC: 141 MMOL/L (ref 136–145)
WBC # BLD AUTO: 7.95 10*3/MM3 (ref 3.4–10.8)

## 2021-10-20 PROCEDURE — 25010000002 PALONOSETRON PER 25 MCG: Performed by: NURSE PRACTITIONER

## 2021-10-20 PROCEDURE — 96367 TX/PROPH/DG ADDL SEQ IV INF: CPT

## 2021-10-20 PROCEDURE — 96375 TX/PRO/DX INJ NEW DRUG ADDON: CPT

## 2021-10-20 PROCEDURE — 25010000002 FOSAPREPITANT PER 1 MG: Performed by: NURSE PRACTITIONER

## 2021-10-20 PROCEDURE — 80053 COMPREHEN METABOLIC PANEL: CPT | Performed by: INTERNAL MEDICINE

## 2021-10-20 PROCEDURE — 99214 OFFICE O/P EST MOD 30 MIN: CPT | Performed by: INTERNAL MEDICINE

## 2021-10-20 PROCEDURE — 25010000002 HEPARIN LOCK FLUSH PER 10 UNITS: Performed by: INTERNAL MEDICINE

## 2021-10-20 PROCEDURE — 85025 COMPLETE CBC W/AUTO DIFF WBC: CPT | Performed by: INTERNAL MEDICINE

## 2021-10-20 PROCEDURE — 25010000002 ETOPOSIDE 500 MG/25ML SOLUTION 25 ML VIAL: Performed by: NURSE PRACTITIONER

## 2021-10-20 PROCEDURE — 96417 CHEMO IV INFUS EACH ADDL SEQ: CPT

## 2021-10-20 PROCEDURE — 96413 CHEMO IV INFUSION 1 HR: CPT

## 2021-10-20 PROCEDURE — 25010000002 CARBOPLATIN PER 50 MG: Performed by: NURSE PRACTITIONER

## 2021-10-20 PROCEDURE — 25010000002 DEXAMETHASONE SODIUM PHOSPHATE 20 MG/5ML SOLUTION: Performed by: NURSE PRACTITIONER

## 2021-10-20 RX ORDER — SODIUM CHLORIDE 9 MG/ML
250 INJECTION, SOLUTION INTRAVENOUS ONCE
Status: CANCELLED | OUTPATIENT
Start: 2021-10-21

## 2021-10-20 RX ORDER — SODIUM CHLORIDE 9 MG/ML
250 INJECTION, SOLUTION INTRAVENOUS ONCE
Status: COMPLETED | OUTPATIENT
Start: 2021-10-20 | End: 2021-10-20

## 2021-10-20 RX ORDER — HEPARIN SODIUM (PORCINE) LOCK FLUSH IV SOLN 100 UNIT/ML 100 UNIT/ML
500 SOLUTION INTRAVENOUS AS NEEDED
Status: DISCONTINUED | OUTPATIENT
Start: 2021-10-20 | End: 2021-10-20 | Stop reason: HOSPADM

## 2021-10-20 RX ORDER — SODIUM CHLORIDE 0.9 % (FLUSH) 0.9 %
10 SYRINGE (ML) INJECTION AS NEEDED
Status: CANCELLED | OUTPATIENT
Start: 2021-10-21

## 2021-10-20 RX ORDER — HEPARIN SODIUM (PORCINE) LOCK FLUSH IV SOLN 100 UNIT/ML 100 UNIT/ML
500 SOLUTION INTRAVENOUS AS NEEDED
Status: CANCELLED | OUTPATIENT
Start: 2021-10-21

## 2021-10-20 RX ORDER — FAMOTIDINE 10 MG/ML
20 INJECTION, SOLUTION INTRAVENOUS AS NEEDED
Status: CANCELLED | OUTPATIENT
Start: 2021-10-20

## 2021-10-20 RX ORDER — SODIUM CHLORIDE 9 MG/ML
250 INJECTION, SOLUTION INTRAVENOUS ONCE
Status: CANCELLED | OUTPATIENT
Start: 2021-10-22

## 2021-10-20 RX ORDER — OLANZAPINE 5 MG/1
5 TABLET ORAL ONCE
Status: COMPLETED | OUTPATIENT
Start: 2021-10-20 | End: 2021-10-20

## 2021-10-20 RX ORDER — DIPHENHYDRAMINE HYDROCHLORIDE 50 MG/ML
50 INJECTION INTRAMUSCULAR; INTRAVENOUS AS NEEDED
Status: CANCELLED | OUTPATIENT
Start: 2021-10-20

## 2021-10-20 RX ORDER — PALONOSETRON 0.05 MG/ML
0.25 INJECTION, SOLUTION INTRAVENOUS ONCE
Status: COMPLETED | OUTPATIENT
Start: 2021-10-20 | End: 2021-10-20

## 2021-10-20 RX ADMIN — SODIUM CHLORIDE, PRESERVATIVE FREE 500 UNITS: 5 INJECTION INTRAVENOUS at 13:20

## 2021-10-20 RX ADMIN — ETOPOSIDE 180 MG: 20 INJECTION, SOLUTION, CONCENTRATE INTRAVENOUS at 11:36

## 2021-10-20 RX ADMIN — SODIUM CHLORIDE 250 ML: 9 INJECTION, SOLUTION INTRAVENOUS at 10:50

## 2021-10-20 RX ADMIN — OLANZAPINE 5 MG: 5 TABLET, FILM COATED ORAL at 10:50

## 2021-10-20 RX ADMIN — PALONOSETRON HYDROCHLORIDE 0.25 MG: 0.25 INJECTION INTRAVENOUS at 10:50

## 2021-10-20 RX ADMIN — DEXAMETHASONE SODIUM PHOSPHATE 12 MG: 4 INJECTION, SOLUTION INTRAMUSCULAR; INTRAVENOUS at 10:50

## 2021-10-20 RX ADMIN — FOSAPREPITANT 150 MG: 150 INJECTION, POWDER, LYOPHILIZED, FOR SOLUTION INTRAVENOUS at 11:05

## 2021-10-20 RX ADMIN — CARBOPLATIN 360 MG: 10 INJECTION, SOLUTION INTRAVENOUS at 12:39

## 2021-10-21 ENCOUNTER — INFUSION (OUTPATIENT)
Dept: ONCOLOGY | Facility: HOSPITAL | Age: 71
End: 2021-10-21

## 2021-10-21 VITALS
HEART RATE: 83 BPM | SYSTOLIC BLOOD PRESSURE: 107 MMHG | RESPIRATION RATE: 18 BRPM | TEMPERATURE: 97.5 F | OXYGEN SATURATION: 98 % | BODY MASS INDEX: 24.75 KG/M2 | WEIGHT: 158 LBS | DIASTOLIC BLOOD PRESSURE: 63 MMHG

## 2021-10-21 DIAGNOSIS — T45.1X5A CHEMOTHERAPY INDUCED NEUTROPENIA (HCC): ICD-10-CM

## 2021-10-21 DIAGNOSIS — C66.1 URETERAL CARCINOMA, RIGHT (HCC): ICD-10-CM

## 2021-10-21 DIAGNOSIS — D70.1 CHEMOTHERAPY INDUCED NEUTROPENIA (HCC): ICD-10-CM

## 2021-10-21 DIAGNOSIS — C67.9 SMALL CELL CARCINOMA OF BLADDER (HCC): Primary | ICD-10-CM

## 2021-10-21 PROCEDURE — 25010000002 HEPARIN LOCK FLUSH PER 10 UNITS: Performed by: INTERNAL MEDICINE

## 2021-10-21 PROCEDURE — 96367 TX/PROPH/DG ADDL SEQ IV INF: CPT

## 2021-10-21 PROCEDURE — 25010000002 ETOPOSIDE 500 MG/25ML SOLUTION 25 ML VIAL: Performed by: NURSE PRACTITIONER

## 2021-10-21 PROCEDURE — 96413 CHEMO IV INFUSION 1 HR: CPT

## 2021-10-21 PROCEDURE — 96375 TX/PRO/DX INJ NEW DRUG ADDON: CPT

## 2021-10-21 PROCEDURE — 25010000002 DEXAMETHASONE SODIUM PHOSPHATE 20 MG/5ML SOLUTION: Performed by: NURSE PRACTITIONER

## 2021-10-21 RX ORDER — HEPARIN SODIUM (PORCINE) LOCK FLUSH IV SOLN 100 UNIT/ML 100 UNIT/ML
500 SOLUTION INTRAVENOUS AS NEEDED
Status: DISCONTINUED | OUTPATIENT
Start: 2021-10-21 | End: 2021-10-21 | Stop reason: HOSPADM

## 2021-10-21 RX ORDER — SODIUM CHLORIDE 9 MG/ML
250 INJECTION, SOLUTION INTRAVENOUS ONCE
Status: COMPLETED | OUTPATIENT
Start: 2021-10-21 | End: 2021-10-21

## 2021-10-21 RX ORDER — HEPARIN SODIUM (PORCINE) LOCK FLUSH IV SOLN 100 UNIT/ML 100 UNIT/ML
500 SOLUTION INTRAVENOUS AS NEEDED
Status: CANCELLED | OUTPATIENT
Start: 2021-10-22

## 2021-10-21 RX ORDER — SODIUM CHLORIDE 0.9 % (FLUSH) 0.9 %
10 SYRINGE (ML) INJECTION AS NEEDED
Status: CANCELLED | OUTPATIENT
Start: 2021-10-22

## 2021-10-21 RX ADMIN — SODIUM CHLORIDE 250 ML: 9 INJECTION, SOLUTION INTRAVENOUS at 10:05

## 2021-10-21 RX ADMIN — DEXAMETHASONE SODIUM PHOSPHATE 12 MG: 4 INJECTION, SOLUTION INTRAMUSCULAR; INTRAVENOUS at 10:05

## 2021-10-21 RX ADMIN — ETOPOSIDE 180 MG: 20 INJECTION, SOLUTION, CONCENTRATE INTRAVENOUS at 10:26

## 2021-10-21 RX ADMIN — SODIUM CHLORIDE, PRESERVATIVE FREE 500 UNITS: 5 INJECTION INTRAVENOUS at 11:30

## 2021-10-22 ENCOUNTER — INFUSION (OUTPATIENT)
Dept: ONCOLOGY | Facility: HOSPITAL | Age: 71
End: 2021-10-22

## 2021-10-22 VITALS
OXYGEN SATURATION: 98 % | HEART RATE: 76 BPM | DIASTOLIC BLOOD PRESSURE: 73 MMHG | SYSTOLIC BLOOD PRESSURE: 120 MMHG | WEIGHT: 160.3 LBS | TEMPERATURE: 98.1 F | RESPIRATION RATE: 18 BRPM | BODY MASS INDEX: 25.11 KG/M2

## 2021-10-22 DIAGNOSIS — T45.1X5A CHEMOTHERAPY INDUCED NEUTROPENIA (HCC): Primary | ICD-10-CM

## 2021-10-22 DIAGNOSIS — C67.9 SMALL CELL CARCINOMA OF BLADDER (HCC): ICD-10-CM

## 2021-10-22 DIAGNOSIS — D70.1 CHEMOTHERAPY INDUCED NEUTROPENIA (HCC): Primary | ICD-10-CM

## 2021-10-22 DIAGNOSIS — C66.1 URETERAL CARCINOMA, RIGHT (HCC): ICD-10-CM

## 2021-10-22 PROCEDURE — 25010000002 HEPARIN LOCK FLUSH PER 10 UNITS: Performed by: INTERNAL MEDICINE

## 2021-10-22 PROCEDURE — 96375 TX/PRO/DX INJ NEW DRUG ADDON: CPT

## 2021-10-22 PROCEDURE — 25010000002 ETOPOSIDE 500 MG/25ML SOLUTION 25 ML VIAL: Performed by: NURSE PRACTITIONER

## 2021-10-22 PROCEDURE — 96413 CHEMO IV INFUSION 1 HR: CPT

## 2021-10-22 PROCEDURE — 96377 APPLICATON ON-BODY INJECTOR: CPT

## 2021-10-22 PROCEDURE — 25010000002 DEXAMETHASONE SODIUM PHOSPHATE 20 MG/5ML SOLUTION: Performed by: NURSE PRACTITIONER

## 2021-10-22 PROCEDURE — 25010000002 PEGFILGRASTIM 6 MG/0.6ML PREFILLED SYRINGE KIT: Performed by: NURSE PRACTITIONER

## 2021-10-22 RX ORDER — SODIUM CHLORIDE 0.9 % (FLUSH) 0.9 %
10 SYRINGE (ML) INJECTION AS NEEDED
Status: CANCELLED | OUTPATIENT
Start: 2021-11-10

## 2021-10-22 RX ORDER — HEPARIN SODIUM (PORCINE) LOCK FLUSH IV SOLN 100 UNIT/ML 100 UNIT/ML
SOLUTION INTRAVENOUS
Status: DISPENSED
Start: 2021-10-22

## 2021-10-22 RX ORDER — SODIUM CHLORIDE 0.9 % (FLUSH) 0.9 %
10 SYRINGE (ML) INJECTION AS NEEDED
Status: DISCONTINUED | OUTPATIENT
Start: 2021-10-22 | End: 2021-10-22 | Stop reason: HOSPADM

## 2021-10-22 RX ORDER — HEPARIN SODIUM (PORCINE) LOCK FLUSH IV SOLN 100 UNIT/ML 100 UNIT/ML
500 SOLUTION INTRAVENOUS AS NEEDED
Status: CANCELLED | OUTPATIENT
Start: 2021-11-10

## 2021-10-22 RX ORDER — SODIUM CHLORIDE 9 MG/ML
250 INJECTION, SOLUTION INTRAVENOUS ONCE
Status: COMPLETED | OUTPATIENT
Start: 2021-10-22 | End: 2021-10-22

## 2021-10-22 RX ORDER — HEPARIN SODIUM (PORCINE) LOCK FLUSH IV SOLN 100 UNIT/ML 100 UNIT/ML
500 SOLUTION INTRAVENOUS AS NEEDED
Status: DISCONTINUED | OUTPATIENT
Start: 2021-10-22 | End: 2021-10-22 | Stop reason: HOSPADM

## 2021-10-22 RX ADMIN — SODIUM CHLORIDE 250 ML: 9 INJECTION, SOLUTION INTRAVENOUS at 10:18

## 2021-10-22 RX ADMIN — SODIUM CHLORIDE, PRESERVATIVE FREE 10 ML: 5 INJECTION INTRAVENOUS at 12:18

## 2021-10-22 RX ADMIN — ETOPOSIDE 180 MG: 20 INJECTION, SOLUTION, CONCENTRATE INTRAVENOUS at 10:46

## 2021-10-22 RX ADMIN — Medication 500 UNITS: at 12:18

## 2021-10-22 RX ADMIN — PEGFILGRASTIM 6 MG: KIT SUBCUTANEOUS at 12:18

## 2021-10-22 RX ADMIN — DEXAMETHASONE SODIUM PHOSPHATE 12 MG: 4 INJECTION, SOLUTION INTRAMUSCULAR; INTRAVENOUS at 10:21

## 2021-11-08 DIAGNOSIS — D70.1 CHEMOTHERAPY INDUCED NEUTROPENIA (HCC): Primary | ICD-10-CM

## 2021-11-08 DIAGNOSIS — R79.89 ELEVATED SERUM CREATININE: ICD-10-CM

## 2021-11-08 DIAGNOSIS — C67.9 SMALL CELL CARCINOMA OF BLADDER (HCC): ICD-10-CM

## 2021-11-08 DIAGNOSIS — T45.1X5A CHEMOTHERAPY INDUCED NEUTROPENIA (HCC): Primary | ICD-10-CM

## 2021-11-08 RX ORDER — FAMOTIDINE 10 MG/ML
20 INJECTION, SOLUTION INTRAVENOUS AS NEEDED
Status: CANCELLED | OUTPATIENT
Start: 2021-11-10

## 2021-11-08 RX ORDER — PALONOSETRON 0.05 MG/ML
0.25 INJECTION, SOLUTION INTRAVENOUS ONCE
Status: CANCELLED | OUTPATIENT
Start: 2021-11-10

## 2021-11-08 RX ORDER — SODIUM CHLORIDE 9 MG/ML
250 INJECTION, SOLUTION INTRAVENOUS ONCE
Status: CANCELLED | OUTPATIENT
Start: 2021-11-11

## 2021-11-08 RX ORDER — OLANZAPINE 5 MG/1
5 TABLET ORAL ONCE
Status: CANCELLED | OUTPATIENT
Start: 2021-11-10 | End: 2021-11-10

## 2021-11-08 RX ORDER — SODIUM CHLORIDE 9 MG/ML
250 INJECTION, SOLUTION INTRAVENOUS ONCE
Status: CANCELLED | OUTPATIENT
Start: 2021-11-10

## 2021-11-08 RX ORDER — DIPHENHYDRAMINE HYDROCHLORIDE 50 MG/ML
50 INJECTION INTRAMUSCULAR; INTRAVENOUS AS NEEDED
Status: CANCELLED | OUTPATIENT
Start: 2021-11-10

## 2021-11-08 RX ORDER — SODIUM CHLORIDE 9 MG/ML
250 INJECTION, SOLUTION INTRAVENOUS ONCE
Status: CANCELLED | OUTPATIENT
Start: 2021-11-12

## 2021-11-10 ENCOUNTER — OFFICE VISIT (OUTPATIENT)
Dept: ONCOLOGY | Facility: CLINIC | Age: 71
End: 2021-11-10

## 2021-11-10 ENCOUNTER — INFUSION (OUTPATIENT)
Dept: ONCOLOGY | Facility: HOSPITAL | Age: 71
End: 2021-11-10

## 2021-11-10 ENCOUNTER — LAB (OUTPATIENT)
Dept: ONCOLOGY | Facility: CLINIC | Age: 71
End: 2021-11-10

## 2021-11-10 VITALS
BODY MASS INDEX: 24.81 KG/M2 | OXYGEN SATURATION: 99 % | DIASTOLIC BLOOD PRESSURE: 64 MMHG | RESPIRATION RATE: 18 BRPM | SYSTOLIC BLOOD PRESSURE: 116 MMHG | HEART RATE: 75 BPM | WEIGHT: 158.4 LBS | TEMPERATURE: 97.5 F

## 2021-11-10 VITALS
TEMPERATURE: 97.5 F | DIASTOLIC BLOOD PRESSURE: 64 MMHG | BODY MASS INDEX: 24.81 KG/M2 | SYSTOLIC BLOOD PRESSURE: 116 MMHG | HEART RATE: 75 BPM | OXYGEN SATURATION: 99 % | WEIGHT: 158.4 LBS | RESPIRATION RATE: 18 BRPM

## 2021-11-10 DIAGNOSIS — T45.1X5A CHEMOTHERAPY INDUCED NEUTROPENIA (HCC): ICD-10-CM

## 2021-11-10 DIAGNOSIS — C66.1 URETERAL CARCINOMA, RIGHT (HCC): ICD-10-CM

## 2021-11-10 DIAGNOSIS — C67.9 SMALL CELL CARCINOMA OF BLADDER (HCC): ICD-10-CM

## 2021-11-10 DIAGNOSIS — D70.1 CHEMOTHERAPY INDUCED NEUTROPENIA (HCC): ICD-10-CM

## 2021-11-10 DIAGNOSIS — D70.1 CHEMOTHERAPY INDUCED NEUTROPENIA (HCC): Primary | ICD-10-CM

## 2021-11-10 DIAGNOSIS — T45.1X5A CHEMOTHERAPY INDUCED NEUTROPENIA (HCC): Primary | ICD-10-CM

## 2021-11-10 DIAGNOSIS — R79.89 ELEVATED SERUM CREATININE: Primary | ICD-10-CM

## 2021-11-10 LAB
ALBUMIN SERPL-MCNC: 4.47 G/DL (ref 3.5–5.2)
ALBUMIN/GLOB SERPL: 1.8 G/DL
ALP SERPL-CCNC: 87 U/L (ref 39–117)
ALT SERPL W P-5'-P-CCNC: 14 U/L (ref 1–41)
ANION GAP SERPL CALCULATED.3IONS-SCNC: 10.3 MMOL/L (ref 5–15)
AST SERPL-CCNC: 21 U/L (ref 1–40)
BASOPHILS # BLD AUTO: 0.07 10*3/MM3 (ref 0–0.2)
BASOPHILS NFR BLD AUTO: 1 % (ref 0–1.5)
BILIRUB SERPL-MCNC: 0.4 MG/DL (ref 0–1.2)
BUN SERPL-MCNC: 14 MG/DL (ref 8–23)
BUN/CREAT SERPL: 9.3 (ref 7–25)
CALCIUM SPEC-SCNC: 9.5 MG/DL (ref 8.6–10.5)
CHLORIDE SERPL-SCNC: 100 MMOL/L (ref 98–107)
CO2 SERPL-SCNC: 29.7 MMOL/L (ref 22–29)
CREAT SERPL-MCNC: 1.5 MG/DL (ref 0.76–1.27)
DEPRECATED RDW RBC AUTO: 61.3 FL (ref 37–54)
EOSINOPHIL # BLD AUTO: 0.07 10*3/MM3 (ref 0–0.4)
EOSINOPHIL NFR BLD AUTO: 1 % (ref 0.3–6.2)
ERYTHROCYTE [DISTWIDTH] IN BLOOD BY AUTOMATED COUNT: 17.2 % (ref 12.3–15.4)
GFR SERPL CREATININE-BSD FRML MDRD: 46 ML/MIN/1.73
GLOBULIN UR ELPH-MCNC: 2.4 GM/DL
GLUCOSE SERPL-MCNC: 123 MG/DL (ref 65–99)
HCT VFR BLD AUTO: 33.5 % (ref 37.5–51)
HGB BLD-MCNC: 11.7 G/DL (ref 13–17.7)
IMM GRANULOCYTES # BLD AUTO: 0.17 10*3/MM3 (ref 0–0.05)
IMM GRANULOCYTES NFR BLD AUTO: 2.5 % (ref 0–0.5)
LYMPHOCYTES # BLD AUTO: 1.65 10*3/MM3 (ref 0.7–3.1)
LYMPHOCYTES NFR BLD AUTO: 23.9 % (ref 19.6–45.3)
MCH RBC QN AUTO: 33.9 PG (ref 26.6–33)
MCHC RBC AUTO-ENTMCNC: 34.9 G/DL (ref 31.5–35.7)
MCV RBC AUTO: 97.1 FL (ref 79–97)
MONOCYTES # BLD AUTO: 0.74 10*3/MM3 (ref 0.1–0.9)
MONOCYTES NFR BLD AUTO: 10.7 % (ref 5–12)
NEUTROPHILS NFR BLD AUTO: 4.19 10*3/MM3 (ref 1.7–7)
NEUTROPHILS NFR BLD AUTO: 60.9 % (ref 42.7–76)
NRBC BLD AUTO-RTO: 0 /100 WBC (ref 0–0.2)
PLATELET # BLD AUTO: 228 10*3/MM3 (ref 140–450)
PMV BLD AUTO: 8.8 FL (ref 6–12)
POTASSIUM SERPL-SCNC: 3.6 MMOL/L (ref 3.5–5.2)
PROT SERPL-MCNC: 6.9 G/DL (ref 6–8.5)
RBC # BLD AUTO: 3.45 10*6/MM3 (ref 4.14–5.8)
SODIUM SERPL-SCNC: 140 MMOL/L (ref 136–145)
WBC # BLD AUTO: 6.89 10*3/MM3 (ref 3.4–10.8)

## 2021-11-10 PROCEDURE — 25010000002 DEXAMETHASONE SODIUM PHOSPHATE 20 MG/5ML SOLUTION: Performed by: INTERNAL MEDICINE

## 2021-11-10 PROCEDURE — 25010000002 FOSAPREPITANT PER 1 MG: Performed by: INTERNAL MEDICINE

## 2021-11-10 PROCEDURE — 25010000002 ETOPOSIDE 500 MG/25ML SOLUTION 25 ML VIAL: Performed by: INTERNAL MEDICINE

## 2021-11-10 PROCEDURE — 85025 COMPLETE CBC W/AUTO DIFF WBC: CPT | Performed by: INTERNAL MEDICINE

## 2021-11-10 PROCEDURE — 25010000002 CARBOPLATIN PER 50 MG: Performed by: INTERNAL MEDICINE

## 2021-11-10 PROCEDURE — 80053 COMPREHEN METABOLIC PANEL: CPT | Performed by: INTERNAL MEDICINE

## 2021-11-10 PROCEDURE — 96375 TX/PRO/DX INJ NEW DRUG ADDON: CPT

## 2021-11-10 PROCEDURE — 25010000002 HEPARIN LOCK FLUSH PER 10 UNITS: Performed by: INTERNAL MEDICINE

## 2021-11-10 PROCEDURE — 25010000002 PALONOSETRON PER 25 MCG: Performed by: INTERNAL MEDICINE

## 2021-11-10 PROCEDURE — 96413 CHEMO IV INFUSION 1 HR: CPT

## 2021-11-10 PROCEDURE — 96417 CHEMO IV INFUS EACH ADDL SEQ: CPT

## 2021-11-10 PROCEDURE — 99213 OFFICE O/P EST LOW 20 MIN: CPT | Performed by: INTERNAL MEDICINE

## 2021-11-10 PROCEDURE — 96367 TX/PROPH/DG ADDL SEQ IV INF: CPT

## 2021-11-10 RX ORDER — OLANZAPINE 5 MG/1
5 TABLET ORAL ONCE
Status: COMPLETED | OUTPATIENT
Start: 2021-11-10 | End: 2021-11-10

## 2021-11-10 RX ORDER — HEPARIN SODIUM (PORCINE) LOCK FLUSH IV SOLN 100 UNIT/ML 100 UNIT/ML
500 SOLUTION INTRAVENOUS AS NEEDED
Status: DISCONTINUED | OUTPATIENT
Start: 2021-11-10 | End: 2021-11-10 | Stop reason: HOSPADM

## 2021-11-10 RX ORDER — SODIUM CHLORIDE 0.9 % (FLUSH) 0.9 %
10 SYRINGE (ML) INJECTION AS NEEDED
Status: CANCELLED | OUTPATIENT
Start: 2021-11-11

## 2021-11-10 RX ORDER — SODIUM CHLORIDE 0.9 % (FLUSH) 0.9 %
10 SYRINGE (ML) INJECTION AS NEEDED
Status: DISCONTINUED | OUTPATIENT
Start: 2021-11-10 | End: 2021-11-10 | Stop reason: HOSPADM

## 2021-11-10 RX ORDER — SODIUM CHLORIDE 9 MG/ML
1000 INJECTION, SOLUTION INTRAVENOUS ONCE
Status: COMPLETED | OUTPATIENT
Start: 2021-11-10 | End: 2021-11-10

## 2021-11-10 RX ORDER — HEPARIN SODIUM (PORCINE) LOCK FLUSH IV SOLN 100 UNIT/ML 100 UNIT/ML
500 SOLUTION INTRAVENOUS AS NEEDED
Status: CANCELLED | OUTPATIENT
Start: 2021-11-11

## 2021-11-10 RX ORDER — PALONOSETRON 0.05 MG/ML
0.25 INJECTION, SOLUTION INTRAVENOUS ONCE
Status: COMPLETED | OUTPATIENT
Start: 2021-11-10 | End: 2021-11-10

## 2021-11-10 RX ADMIN — HEPARIN 500 UNITS: 100 SYRINGE at 12:45

## 2021-11-10 RX ADMIN — OLANZAPINE 5 MG: 5 TABLET, FILM COATED ORAL at 09:37

## 2021-11-10 RX ADMIN — FOSAPREPITANT 150 MG: 150 INJECTION, POWDER, LYOPHILIZED, FOR SOLUTION INTRAVENOUS at 09:58

## 2021-11-10 RX ADMIN — SODIUM CHLORIDE 1000 ML: 9 INJECTION, SOLUTION INTRAVENOUS at 09:35

## 2021-11-10 RX ADMIN — DEXAMETHASONE SODIUM PHOSPHATE 12 MG: 4 INJECTION, SOLUTION INTRAMUSCULAR; INTRAVENOUS at 09:38

## 2021-11-10 RX ADMIN — PALONOSETRON HYDROCHLORIDE 0.25 MG: 0.25 INJECTION INTRAVENOUS at 09:36

## 2021-11-10 RX ADMIN — CARBOPLATIN 340 MG: 10 INJECTION, SOLUTION INTRAVENOUS at 11:52

## 2021-11-10 RX ADMIN — ETOPOSIDE 180 MG: 20 INJECTION INTRAVENOUS at 10:39

## 2021-11-10 RX ADMIN — SODIUM CHLORIDE, PRESERVATIVE FREE 10 ML: 5 INJECTION INTRAVENOUS at 12:45

## 2021-11-10 NOTE — PROGRESS NOTES
NAME: Alycia De La Rosa    : 1950    DATE OF FOLLOW UP:  11/10/2021    DIAGNOSIS:   Small cell bladder cancer, Limited stage disease    TREATMENT HISTORY:  1.         CHIEF COMPLAINT:  Follow up Bladder cancer      HISTORY OF PRESENT ILLNESS:   Alycia De La Rosa is a very pleasant 71 y.o. male who is being seen today at the request of Dr. Xavier Arnold for evaluation and treatment of small cell bladder cancer. He first developed gross hematuria in 2021 and was seen by his PCP Dr. Alec Soto.  He had imaging revealing hydronephrosis due to obstruction of the R ureteral orifice with non-functioning R kidney and was referred to Dr. Link who performed cystoscopy with biopsy on 3-17-21.  Cystoscopy revealed a normal urethra nd prostate with indented R ureteral orfice with obvious tumor behind it.  He performed transurethral resection until obvious ureter and sent tumor for pathologic examination.  This showed high grade urothelial carcinoma .  Muscularis propria was present but uninvolved.  Dr. Link referred him to Dr. Sylvester for consideration of R nephrectomy / ureterectomy.  Mr. De La Rosa was referred to Dr. Sylvester who performed repeat cystoscopy 21.  This revealed a large mass pushing into the bladder mucosa / external compression with mass in the R saad-trigone and R bladder wall.  R ureteral orifice could not be identified.  A 10x8 cm mass was resected.  PAthology showed small cell carcinoma with invasion of the muscularis propria as well as focal conventional high grade urothelial carcinoma.  Dr. Sylvester referred him to Dr. Xavier Arnold given small cell histology.  Dr. Lott recommended imaging for staging and treatment with Carbo/Etoposide/Atezolizumab. He was referred here because he preferred to receive treatment close to home.    Mr. De La Rosa presents today with his daughter (who works with Dr. Otoole).  He is overall doing well.  He has had 2 episodes of gross hematuria in total, but this hasn't been a  persistent problem.  He denies weight loss.  No chest pain or shortness of breath. He complains of RLQ pelvic pain which radiates into his R hip/ R upper thigh.  He has been using Tylenol for pain which he says is sufficient, but he is clearly in pain in the office today and is unable to sit in the chair because of pain.         INTERVAL HISTORY:   Mr. De La Rosa is here today for follow up of small cell bladder cancer and C5 of Carbo/Etoposide. He continues to tolerate his treatment well. He denies pain and hasn't had to use pain medication. He denies hematuria.  He has no concerns or complaints today.      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       PAST SURGICAL HISTORY:  Past Surgical History:   Procedure Laterality Date   • CYSTOSCOPY RETROGRADE PYELOGRAM Right 3/17/2021    Procedure: CYSTOSCOPY RETROGRADE PYELOGRAM;  Surgeon: Dennis Link MD;  Location: [facility];  Service: Urology;  Laterality: Right;   • CYSTOSCOPY URETEROSCOPY Bilateral 3/17/2021    Procedure: CYSTOSCOPY TRANSURETHRAL RESECTION OF BLADDER TUMOR;  Surgeon: Dennis Link MD;  Location: [facility];  Service: Urology;  Laterality: Bilateral;   • HYDROCELE EXCISION / REPAIR      x2   • VENOUS ACCESS DEVICE (PORT) INSERTION Left 8/4/2021    Procedure: INSERTION VENOUS ACCESS DEVICE;  Surgeon: Katy Cool MD;  Location: [facility];  Service: General;  Laterality: Left;       FAMILY HISTORY:  Family History   Problem Relation Age of Onset   • Cancer Father 84        prostate   • Cancer Mother 73        mouth   • Cancer Maternal Aunt         mouth   • Cancer Maternal Grandfather          SOCIAL HISTORY:  Social History     Socioeconomic  History   • Marital status:    Tobacco Use   • Smoking status: Former Smoker     Packs/day: 3.00     Years: 5.00     Pack years: 15.00     Types: Cigarettes     Quit date:      Years since quittin.8   • Smokeless tobacco: Former User   Vaping Use   • Vaping Use: Never used   Substance and Sexual Activity   • Alcohol use: Not Currently   • Drug use: Never   • Sexual activity: Defer     Social History     Social History Narrative    He used to work as a  and denies any unusual chemical exposures.        REVIEW OF SYSTEMS:   A comprehensive 14 point review of systems was performed.  Significant findings as mentioned above.  All other systems reviewed and are negative.      MEDICATIONS:  The current medication list was reviewed in the EMR    Current Outpatient Medications:   •  amLODIPine (NORVASC) 10 MG tablet, 10 mg., Disp: , Rfl:   •  hydroCHLOROthiazide (HYDRODIURIL) 12.5 MG tablet, 12.5 mg., Disp: , Rfl:   •  HYDROcodone-acetaminophen (NORCO)  MG per tablet, Take 1 tablet by mouth Every 4 (Four) Hours As Needed for Moderate Pain, Disp: 12 tablet, Rfl: 0  •  HYDROcodone-acetaminophen (Norco) 7.5-325 MG per tablet, Take 1 tablet by mouth 4 (Four) Times a Day As Needed for Moderate Pain ., Disp: 8 tablet, Rfl: 0  •  lidocaine-prilocaine (EMLA) 2.5-2.5 % cream, Apply generous amount to port site 30-45 minutes prior to use and cover with plastic wrap, Disp: 30 g, Rfl: 5  •  metoprolol tartrate (LOPRESSOR) 50 MG tablet, 50 mg., Disp: , Rfl:   •  Morphine (MS CONTIN) 15 MG 12 hr tablet, Take 1 tablet by mouth Every 8 (Eight) Hours., Disp: 90 tablet, Rfl: 0  •  OLANZapine (zyPREXA) 5 MG tablet, Take by mouth on days 2, 3 and 4 after chemotherapy., Disp: 3 tablet, Rfl: 5  •  OLANZapine (zyPREXA) 5 MG tablet, Take 1 tablet by mouth Every Night. Take on days 2, 3 and 4 after chemotherapy., Disp: 3 tablet, Rfl: 5  •  ondansetron (ZOFRAN) 8 MG tablet, Take 1 tablet by mouth Every 8 (Eight) Hours  As Needed for Nausea or Vomiting., Disp: 30 tablet, Rfl: 1  •  ondansetron (ZOFRAN) 8 MG tablet, Take 1 tablet by mouth 3 (Three) Times a Day As Needed for Nausea or Vomiting., Disp: 30 tablet, Rfl: 5  •  oxyCODONE (ROXICODONE) 5 MG immediate release tablet, Take 1-2 tabs every 4-6 hours as needed for pain, Disp: 180 tablet, Rfl: 0  •  prochlorperazine (COMPAZINE) 10 MG tablet, Take 1 tablet by mouth Every 6 (Six) Hours As Needed for Nausea or Vomiting., Disp: 60 tablet, Rfl: 1  •  sennosides-docusate (Senna-S) 8.6-50 MG per tablet, Take 2 tablets by mouth 2 (Two) Times a Day., Disp: 120 tablet, Rfl: 5  No current facility-administered medications for this visit.    Facility-Administered Medications Ordered in Other Visits:   •  heparin 100 UNIT/ML injection  - ADS Override Pull, , , ,     ALLERGIES:    Allergies   Allergen Reactions   • Lisinopril Anaphylaxis and Rash   • Penicillins Anaphylaxis and Rash       PHYSICAL EXAM:  Vitals:    11/10/21 0827   BP: 116/64   Pulse: 75   Resp: 18   Temp: 97.5 °F (36.4 °C)   SpO2: 99%     Pain Score    11/10/21 0827   PainSc: 0-No pain   ECOG score: 0     General:  Awake, alert and oriented, appears well. In good spirits  HEENT:  Pupils are equal, round and reactive to light and accommodation, Extra-ocular movements full, Oropharyx clear, mucous membranes moist  Neck:  No JVD, thyromegaly or lymphadenopathy  CV:  Regular rate and rhythm, no murmurs, rubs or gallops  Resp:  Lungs are clear to auscultation bilaterally, no wheezing  Abd:  Soft, non-tender, non-distended, bowel sounds present, no organomegaly or masses  Ext:  No clubbing, cyanosis or edema  Lymph:  No cervical, supraclavicular, axillary adenopathy  Neuro: Grossly non-focal exam      PATHOLOGY:  03-17-21 04-20-21 06-14-21          ENDOSCOPY:  Cystoscopy 03-17-21 ([facility]):  Findings: Probable right distal ureteral tumor with nonfunctioning kidney and massive dilation      Cystourethroscopy, TURP  06-14-21 ([facility])  Findings:   1.Left ureteral orifice identified. Retrograde pyelography without hydronephrosis or evidence of filling defect.   2. Large mass pushing into bladder mucosa/external compression- no obvious urothelial papillar lesion. Mass located in the right saad-trigone and right bladder wall. Inability to identify right ureteral orifice  3. Resection of mass with resection size of 10cm x 8 cm.   4. Hemostasis achieved at conclusion of case  5. Placement of 20 F 2 way catheter         IMAGING:  CT Chest 04-12-21  FINDINGS:    LUNGS:  Unremarkable.  No mass.  No consolidation.    PLEURAL SPACE:  Unremarkable.  No pneumothorax.  No significant  effusion.    HEART:  Unremarkable.  No cardiomegaly.  No significant pericardial  effusion.    MEDIASTINUM:  Moderate-sized hiatal hernia.    BONES/JOINTS:  Unremarkable.  No acute fracture.  No dislocation.    SOFT TISSUES:  Unremarkable.    VASCULATURE:  Unremarkable.  No thoracic aortic aneurysm.    LYMPH NODES:  Unremarkable.  No enlarged lymph nodes.    LIVER:  Left lobe of liver cyst measuring 3 cm.    KIDNEYS AND URETERS:  Incidentally imaged right upper kidney shows  moderate hydronephrosis and cortical thinning.     IMPRESSION:    Moderate-sized hiatal hernia.        PET/CT 08-06-21  FINDINGS:      HEAD/NECK:  No FDG hypermetabolic neck adenopathy.  No FDG hypermetabolic masses.     CHEST:   No FDG hypermetabolic thoracic adenopathy.  No FDG hypermetabolic lung nodules or masses.  Evaluation for tiny parenchymal nodules is somewhat limited on low dose  CT secondary to respiratory motion.     ABDOMEN/PELVIS:   The right kidney shows cortical thinning and the right collecting system  is diffusely dilated to the level of the urinary bladder where the wall  is thickened. The left kidney shows diffuse left-sided dilatation as  well.  The pelvis shows heterogeneous soft tissue mass which appears to be  intimate with the prostate. Significantly larger today  than on the  previous CT scan.  Large right-sided hydrocele is present.  There is hypermetabolic activity throughout the dilated left ureter as  well as in the left renal pelvis. This likely represents excreted  radiotracer.  Extensive increased activity intermixed in the heterogeneous appearance  of the prostate.     BONES: There are scattered foci of FDG hypermetabolism most suggestive  of degenerative change seen throughout the axial skeleton. If concern  persist for metastatic lesions of the bone, HDP Bone scan is recommended  for further evaluation.     IMPRESSION:     1. Hydronephrosis bilaterally and hydroureter bilaterally to the level  of a thickened urinary bladder.  2. Diffuse increased activity throughout the left collecting system as  well as in the bladder.  3. Heterogeneous soft tissue mass in the pelvis intimate with the  prostate. The periphery does show hypermetabolic activity. Cannot  completely separate this activity from the bladder. Maximum SUV is  9.656. Urologic follow-up is suggested  4. No other areas of abnormal hypermetabolic activity.      CTCAP 10-18-21  CT FINDINGS: The lungs are generally hyperinflated. No pulmonary  parenchymal lung nodules or masses are identified. There were no pleural  effusions or inflammatory infiltrates. No masses or enlarged lymph nodes  are noted in the mediastinum or hilar areas.     IMPRESSION:  No CT findings to suggest metastatic disease.       FINDINGS: The liver was remarkable for several hepatic cysts that are  stable in comparing with the previous exam. No focal abnormalities were  seen in the spleen. The pancreas was unremarkable. The kidneys show  marked hydronephrosis involving the collecting system of the right  kidney with marked thinning of the cortical tissue. The right ureter is  also dilated. There is a mass in the area of the trigone of the bladder  that extends into the soft tissues of the retroperitoneum on the right  side. This is  concerning for a large bladder tumor. The collecting  system of the left kidney was unremarkable. The aorta is normal in  caliber. There were no enlarged lymph nodes in the retroperitoneum or  mesentery. The bowel shows no evidence of obstruction. There were no  ventral hernias.     IMPRESSION:  Marked hydronephrosis and hydroureter involving the right  kidney, felt to be due to long-standing obstruction. There is diffuse  thinning of the cortical tissue. There is a large mass in the bladder  base on the right side with calcifications extending outside the bladder  into the pelvic soft tissues. The left kidney was unremarkable.      RECENT LABS:  Lab Results   Component Value Date    WBC 7.95 10/20/2021    HGB 11.8 (L) 10/20/2021    HCT 34.4 (L) 10/20/2021    MCV 95.0 10/20/2021    RDW 18.0 (H) 10/20/2021     10/20/2021    NEUTRORELPCT 64.4 10/20/2021    LYMPHORELPCT 20.1 10/20/2021    MONORELPCT 11.2 10/20/2021    EOSRELPCT 0.9 10/20/2021    BASORELPCT 1.3 10/20/2021    NEUTROABS 5.12 10/20/2021    LYMPHSABS 1.60 10/20/2021       Lab Results   Component Value Date     10/20/2021    K 4.2 10/20/2021    CO2 32.6 (H) 10/20/2021     10/20/2021    BUN 18 10/20/2021    CREATININE 1.34 (H) 10/20/2021    EGFRIFNONA 52 (L) 10/20/2021    GLUCOSE 106 (H) 10/20/2021    CALCIUM 10.2 10/20/2021    ALKPHOS 75 10/20/2021    AST 21 10/20/2021    ALT 16 10/20/2021    BILITOT 0.5 10/20/2021    ALBUMIN 4.47 10/20/2021    PROTEINTOT 7.0 10/20/2021     Lab Results   Component Value Date    FERRITIN 823.40 (H) 07/19/2021    IRON 79 07/19/2021    TIBC 305 07/19/2021    LABIRON 26 07/19/2021    YPMTPRPA90 437 07/19/2021    FOLATE 9.52 07/19/2021       Lab Results   Component Value Date/Time     (H) 08/26/2021 09:23 AM    URICACID 5.3 08/26/2021 09:23 AM         ASSESSMENT & PLAN:  Alycia De La Rosa is a very pleasant 71 y.o. male with newly diagnosed small cell carcinoma of the R ureter / bladder with obstructed and  non-functional R kidney as well as  Some focal high grade urothelial cancer.    1.  Small Cell Carcinoma of the Bladder:  -  Appears to have localized disease.   -  Clinically has [id number] disease with involvement of the R pelvic sidewall.    -  Given limited stage disease, recommended treatment with Carboplatin/Etoposide followed by either cystectomy or radiation.  Used Carboplatin over Cisplatin given solitary functional kidney.  - He has completed two cycles of carboplatin/etoposide. Overall, he is tolerating the treatment exceptionally well without any significant side effects.   - After 3 cycles carboplatin/etoposide imaging showed a significant response to treatment.  I reviewed images personally and with Dr. Mejia. He previously had fabi hydronephrosis and after 3 cycles of treatment, the L ureter/kidney were free / unobstructed.  The mass was about 1/3rd the size of its pre-treatment value. That said, there was still extension of the mass beyond the bladder.  - I  recommended that we continue with chemotherapy, extending treatmen tto total 6 cycles of chemotherapy with plan then to send him to Dr. Sylvester and Dr. Harry for decision regarding surgery or radiation.  -  Mr. De La Rosa continues to do well.  Will proceed with C5 treatment today as planned as long as lab allows.   -  Will tentatively plan to complete 6 cycles and then reimage  With CT CAP and send him at that point back to Dr. Sylvester and to Dr. Harry for consideration of surgical resection v. Radiation..    2.  Neoplasm related pain:  - Previously on Morphine ER 15 mg q8h and Oxycodone 5 mg q4h as needed. He reports that pain has now resolved and he isn't needing to take any pain medicine.    3.  Prophlaxis:  He hasn't had Prevnar 13, 2021 influenza or COVID 19 vaccinations.  He says that with PCN allergy (anaphylaxis) he has been very hesitant to get vaccinations.  Explained that none of the vaccines contain PCN but he still declined.    4.  Follow up:   -  Continue w/ C5 Carbo/Etoposide today.  -  Plan for 6 cycles of treatment followed by restaging imaging and consultation with Urology and Radiation Oncology  -  RTC p imaging after 6 cycles  Carbo/Etoposide for further treatment planning      5.  ACO / MERLY/Other  Quality measures  -  Alycia De La Rosa did not receive 2021 flu vaccine. This was recommended but declined.  -  Alycia De La Rosa reports a pain score of 0.   -  Current outpatient and discharge medications have been reconciled for the patient.  Reviewed by: Jade Mathis MD     This note was scribed for Jade Mathis MD by Ana Harper RN.    I, Jade Mathis MD, personally performed the services described in this documentation as scribed by the above named individual in my presence, and it is both accurate and complete.  11/10/2021       I spent 20 minutes with Alycia De La Rosa today.  In the office today, more than 50% of this time was spent face-to-face with him  in counseling / coordination of care, reviewing his medical history and counseling on the current treatment plan.  All questions were answered to his satisfaction      Electronically Signed by: Jade Mathis MD       CC:     MD Xavier Garza MD Barbara Michna, MD Stephen E. Strup, MD

## 2021-11-11 ENCOUNTER — INFUSION (OUTPATIENT)
Dept: ONCOLOGY | Facility: HOSPITAL | Age: 71
End: 2021-11-11

## 2021-11-11 VITALS
SYSTOLIC BLOOD PRESSURE: 115 MMHG | WEIGHT: 160.4 LBS | HEART RATE: 73 BPM | BODY MASS INDEX: 25.12 KG/M2 | TEMPERATURE: 98.4 F | RESPIRATION RATE: 18 BRPM | DIASTOLIC BLOOD PRESSURE: 63 MMHG | OXYGEN SATURATION: 94 %

## 2021-11-11 DIAGNOSIS — T45.1X5A CHEMOTHERAPY INDUCED NEUTROPENIA (HCC): ICD-10-CM

## 2021-11-11 DIAGNOSIS — D70.1 CHEMOTHERAPY INDUCED NEUTROPENIA (HCC): ICD-10-CM

## 2021-11-11 DIAGNOSIS — C67.9 SMALL CELL CARCINOMA OF BLADDER (HCC): ICD-10-CM

## 2021-11-11 DIAGNOSIS — C66.1 URETERAL CARCINOMA, RIGHT (HCC): ICD-10-CM

## 2021-11-11 DIAGNOSIS — R79.89 ELEVATED SERUM CREATININE: Primary | ICD-10-CM

## 2021-11-11 PROCEDURE — 25010000002 HEPARIN LOCK FLUSH PER 10 UNITS: Performed by: INTERNAL MEDICINE

## 2021-11-11 PROCEDURE — 96413 CHEMO IV INFUSION 1 HR: CPT

## 2021-11-11 PROCEDURE — 25010000002 DEXAMETHASONE SODIUM PHOSPHATE 20 MG/5ML SOLUTION: Performed by: INTERNAL MEDICINE

## 2021-11-11 PROCEDURE — 25010000002 ETOPOSIDE 500 MG/25ML SOLUTION 25 ML VIAL: Performed by: INTERNAL MEDICINE

## 2021-11-11 PROCEDURE — 96375 TX/PRO/DX INJ NEW DRUG ADDON: CPT

## 2021-11-11 RX ORDER — HEPARIN SODIUM (PORCINE) LOCK FLUSH IV SOLN 100 UNIT/ML 100 UNIT/ML
500 SOLUTION INTRAVENOUS AS NEEDED
Status: CANCELLED | OUTPATIENT
Start: 2021-11-12

## 2021-11-11 RX ORDER — SODIUM CHLORIDE 0.9 % (FLUSH) 0.9 %
10 SYRINGE (ML) INJECTION AS NEEDED
Status: DISCONTINUED | OUTPATIENT
Start: 2021-11-11 | End: 2021-11-11 | Stop reason: HOSPADM

## 2021-11-11 RX ORDER — SODIUM CHLORIDE 0.9 % (FLUSH) 0.9 %
10 SYRINGE (ML) INJECTION AS NEEDED
Status: CANCELLED | OUTPATIENT
Start: 2021-11-12

## 2021-11-11 RX ORDER — SODIUM CHLORIDE 9 MG/ML
250 INJECTION, SOLUTION INTRAVENOUS ONCE
Status: COMPLETED | OUTPATIENT
Start: 2021-11-11 | End: 2021-11-11

## 2021-11-11 RX ORDER — HEPARIN SODIUM (PORCINE) LOCK FLUSH IV SOLN 100 UNIT/ML 100 UNIT/ML
500 SOLUTION INTRAVENOUS AS NEEDED
Status: DISCONTINUED | OUTPATIENT
Start: 2021-11-11 | End: 2021-11-11 | Stop reason: HOSPADM

## 2021-11-11 RX ADMIN — HEPARIN 500 UNITS: 100 SYRINGE at 11:25

## 2021-11-11 RX ADMIN — SODIUM CHLORIDE 250 ML: 9 INJECTION, SOLUTION INTRAVENOUS at 09:45

## 2021-11-11 RX ADMIN — DEXAMETHASONE SODIUM PHOSPHATE 12 MG: 4 INJECTION, SOLUTION INTRAMUSCULAR; INTRAVENOUS at 09:47

## 2021-11-11 RX ADMIN — SODIUM CHLORIDE, PRESERVATIVE FREE 10 ML: 5 INJECTION INTRAVENOUS at 11:25

## 2021-11-11 RX ADMIN — ETOPOSIDE 180 MG: 20 INJECTION INTRAVENOUS at 10:06

## 2021-11-12 ENCOUNTER — INFUSION (OUTPATIENT)
Dept: ONCOLOGY | Facility: HOSPITAL | Age: 71
End: 2021-11-12

## 2021-11-12 VITALS
DIASTOLIC BLOOD PRESSURE: 63 MMHG | OXYGEN SATURATION: 97 % | SYSTOLIC BLOOD PRESSURE: 111 MMHG | WEIGHT: 161 LBS | TEMPERATURE: 98.4 F | HEART RATE: 71 BPM | BODY MASS INDEX: 25.22 KG/M2 | RESPIRATION RATE: 18 BRPM

## 2021-11-12 DIAGNOSIS — C66.1 URETERAL CARCINOMA, RIGHT (HCC): ICD-10-CM

## 2021-11-12 DIAGNOSIS — R79.89 ELEVATED SERUM CREATININE: Primary | ICD-10-CM

## 2021-11-12 DIAGNOSIS — C67.9 SMALL CELL CARCINOMA OF BLADDER (HCC): ICD-10-CM

## 2021-11-12 DIAGNOSIS — D70.1 CHEMOTHERAPY INDUCED NEUTROPENIA (HCC): ICD-10-CM

## 2021-11-12 DIAGNOSIS — T45.1X5A CHEMOTHERAPY INDUCED NEUTROPENIA (HCC): ICD-10-CM

## 2021-11-12 PROCEDURE — 25010000002 ETOPOSIDE 500 MG/25ML SOLUTION 25 ML VIAL: Performed by: INTERNAL MEDICINE

## 2021-11-12 PROCEDURE — 96413 CHEMO IV INFUSION 1 HR: CPT

## 2021-11-12 PROCEDURE — 96377 APPLICATON ON-BODY INJECTOR: CPT

## 2021-11-12 PROCEDURE — 25010000002 PEGFILGRASTIM 6 MG/0.6ML PREFILLED SYRINGE KIT: Performed by: INTERNAL MEDICINE

## 2021-11-12 PROCEDURE — 25010000002 HEPARIN LOCK FLUSH PER 10 UNITS: Performed by: INTERNAL MEDICINE

## 2021-11-12 PROCEDURE — 96375 TX/PRO/DX INJ NEW DRUG ADDON: CPT

## 2021-11-12 PROCEDURE — 25010000002 DEXAMETHASONE SODIUM PHOSPHATE 20 MG/5ML SOLUTION: Performed by: INTERNAL MEDICINE

## 2021-11-12 RX ORDER — SODIUM CHLORIDE 0.9 % (FLUSH) 0.9 %
10 SYRINGE (ML) INJECTION AS NEEDED
Status: DISCONTINUED | OUTPATIENT
Start: 2021-11-12 | End: 2021-11-12 | Stop reason: HOSPADM

## 2021-11-12 RX ORDER — HEPARIN SODIUM (PORCINE) LOCK FLUSH IV SOLN 100 UNIT/ML 100 UNIT/ML
500 SOLUTION INTRAVENOUS AS NEEDED
Status: DISCONTINUED | OUTPATIENT
Start: 2021-11-12 | End: 2021-11-12 | Stop reason: HOSPADM

## 2021-11-12 RX ORDER — SODIUM CHLORIDE 9 MG/ML
250 INJECTION, SOLUTION INTRAVENOUS ONCE
Status: COMPLETED | OUTPATIENT
Start: 2021-11-12 | End: 2021-11-12

## 2021-11-12 RX ORDER — HEPARIN SODIUM (PORCINE) LOCK FLUSH IV SOLN 100 UNIT/ML 100 UNIT/ML
500 SOLUTION INTRAVENOUS AS NEEDED
Status: CANCELLED | OUTPATIENT
Start: 2021-12-01

## 2021-11-12 RX ORDER — SODIUM CHLORIDE 0.9 % (FLUSH) 0.9 %
10 SYRINGE (ML) INJECTION AS NEEDED
Status: CANCELLED | OUTPATIENT
Start: 2021-12-01

## 2021-11-12 RX ADMIN — DEXAMETHASONE SODIUM PHOSPHATE 12 MG: 4 INJECTION, SOLUTION INTRAMUSCULAR; INTRAVENOUS at 08:54

## 2021-11-12 RX ADMIN — SODIUM CHLORIDE 250 ML: 9 INJECTION, SOLUTION INTRAVENOUS at 08:51

## 2021-11-12 RX ADMIN — HEPARIN 500 UNITS: 100 SYRINGE at 10:50

## 2021-11-12 RX ADMIN — SODIUM CHLORIDE, PRESERVATIVE FREE 10 ML: 5 INJECTION INTRAVENOUS at 10:49

## 2021-11-12 RX ADMIN — ETOPOSIDE 180 MG: 20 INJECTION INTRAVENOUS at 09:13

## 2021-11-12 RX ADMIN — PEGFILGRASTIM 6 MG: KIT SUBCUTANEOUS at 10:54

## 2021-11-28 DIAGNOSIS — C67.9 SMALL CELL CARCINOMA OF BLADDER (HCC): ICD-10-CM

## 2021-11-28 DIAGNOSIS — T45.1X5A CHEMOTHERAPY INDUCED NEUTROPENIA (HCC): ICD-10-CM

## 2021-11-28 DIAGNOSIS — R79.89 ELEVATED SERUM CREATININE: Primary | ICD-10-CM

## 2021-11-28 DIAGNOSIS — D70.1 CHEMOTHERAPY INDUCED NEUTROPENIA (HCC): ICD-10-CM

## 2021-11-28 RX ORDER — SODIUM CHLORIDE 9 MG/ML
250 INJECTION, SOLUTION INTRAVENOUS ONCE
Status: CANCELLED | OUTPATIENT
Start: 2021-12-01

## 2021-11-28 RX ORDER — PALONOSETRON 0.05 MG/ML
0.25 INJECTION, SOLUTION INTRAVENOUS ONCE
Status: CANCELLED | OUTPATIENT
Start: 2021-12-01

## 2021-11-28 RX ORDER — SODIUM CHLORIDE 9 MG/ML
250 INJECTION, SOLUTION INTRAVENOUS ONCE
Status: CANCELLED | OUTPATIENT
Start: 2021-12-02

## 2021-11-28 RX ORDER — DIPHENHYDRAMINE HYDROCHLORIDE 50 MG/ML
50 INJECTION INTRAMUSCULAR; INTRAVENOUS AS NEEDED
Status: CANCELLED | OUTPATIENT
Start: 2021-12-01

## 2021-11-28 RX ORDER — SODIUM CHLORIDE 9 MG/ML
250 INJECTION, SOLUTION INTRAVENOUS ONCE
Status: CANCELLED | OUTPATIENT
Start: 2021-12-03

## 2021-11-28 RX ORDER — FAMOTIDINE 10 MG/ML
20 INJECTION, SOLUTION INTRAVENOUS AS NEEDED
Status: CANCELLED | OUTPATIENT
Start: 2021-12-01

## 2021-11-28 RX ORDER — OLANZAPINE 5 MG/1
5 TABLET ORAL ONCE
Status: CANCELLED | OUTPATIENT
Start: 2021-12-01 | End: 2021-12-01

## 2021-12-01 ENCOUNTER — LAB (OUTPATIENT)
Dept: ONCOLOGY | Facility: CLINIC | Age: 71
End: 2021-12-01

## 2021-12-01 ENCOUNTER — APPOINTMENT (OUTPATIENT)
Dept: ONCOLOGY | Facility: HOSPITAL | Age: 71
End: 2021-12-01

## 2021-12-01 ENCOUNTER — INFUSION (OUTPATIENT)
Dept: ONCOLOGY | Facility: HOSPITAL | Age: 71
End: 2021-12-01

## 2021-12-01 VITALS
HEART RATE: 70 BPM | DIASTOLIC BLOOD PRESSURE: 69 MMHG | WEIGHT: 161 LBS | OXYGEN SATURATION: 98 % | SYSTOLIC BLOOD PRESSURE: 131 MMHG | BODY MASS INDEX: 25.22 KG/M2 | RESPIRATION RATE: 18 BRPM | TEMPERATURE: 98.2 F

## 2021-12-01 DIAGNOSIS — T45.1X5A CHEMOTHERAPY INDUCED NEUTROPENIA (HCC): ICD-10-CM

## 2021-12-01 DIAGNOSIS — R79.89 ELEVATED SERUM CREATININE: ICD-10-CM

## 2021-12-01 DIAGNOSIS — D70.1 CHEMOTHERAPY INDUCED NEUTROPENIA (HCC): ICD-10-CM

## 2021-12-01 DIAGNOSIS — R79.89 ELEVATED SERUM CREATININE: Primary | ICD-10-CM

## 2021-12-01 DIAGNOSIS — C67.9 SMALL CELL CARCINOMA OF BLADDER (HCC): ICD-10-CM

## 2021-12-01 DIAGNOSIS — C66.1 URETERAL CARCINOMA, RIGHT (HCC): ICD-10-CM

## 2021-12-01 LAB
ALBUMIN SERPL-MCNC: 4.23 G/DL (ref 3.5–5.2)
ALBUMIN/GLOB SERPL: 1.6 G/DL
ALP SERPL-CCNC: 95 U/L (ref 39–117)
ALT SERPL W P-5'-P-CCNC: 15 U/L (ref 1–41)
ANION GAP SERPL CALCULATED.3IONS-SCNC: 10 MMOL/L (ref 5–15)
AST SERPL-CCNC: 17 U/L (ref 1–40)
BASOPHILS # BLD AUTO: 0.06 10*3/MM3 (ref 0–0.2)
BASOPHILS NFR BLD AUTO: 0.6 % (ref 0–1.5)
BILIRUB SERPL-MCNC: 0.4 MG/DL (ref 0–1.2)
BUN SERPL-MCNC: 17 MG/DL (ref 8–23)
BUN/CREAT SERPL: 13.4 (ref 7–25)
CALCIUM SPEC-SCNC: 9.3 MG/DL (ref 8.6–10.5)
CHLORIDE SERPL-SCNC: 105 MMOL/L (ref 98–107)
CO2 SERPL-SCNC: 29 MMOL/L (ref 22–29)
CREAT SERPL-MCNC: 1.27 MG/DL (ref 0.76–1.27)
DEPRECATED RDW RBC AUTO: 55.5 FL (ref 37–54)
EOSINOPHIL # BLD AUTO: 0.05 10*3/MM3 (ref 0–0.4)
EOSINOPHIL NFR BLD AUTO: 0.5 % (ref 0.3–6.2)
ERYTHROCYTE [DISTWIDTH] IN BLOOD BY AUTOMATED COUNT: 15 % (ref 12.3–15.4)
GFR SERPL CREATININE-BSD FRML MDRD: 56 ML/MIN/1.73
GLOBULIN UR ELPH-MCNC: 2.6 GM/DL
GLUCOSE SERPL-MCNC: 108 MG/DL (ref 65–99)
HCT VFR BLD AUTO: 36 % (ref 37.5–51)
HGB BLD-MCNC: 12.2 G/DL (ref 13–17.7)
IMM GRANULOCYTES # BLD AUTO: 0.15 10*3/MM3 (ref 0–0.05)
IMM GRANULOCYTES NFR BLD AUTO: 1.6 % (ref 0–0.5)
LYMPHOCYTES # BLD AUTO: 1.33 10*3/MM3 (ref 0.7–3.1)
LYMPHOCYTES NFR BLD AUTO: 14.3 % (ref 19.6–45.3)
MCH RBC QN AUTO: 33.9 PG (ref 26.6–33)
MCHC RBC AUTO-ENTMCNC: 33.9 G/DL (ref 31.5–35.7)
MCV RBC AUTO: 100 FL (ref 79–97)
MONOCYTES # BLD AUTO: 0.85 10*3/MM3 (ref 0.1–0.9)
MONOCYTES NFR BLD AUTO: 9.2 % (ref 5–12)
NEUTROPHILS NFR BLD AUTO: 6.83 10*3/MM3 (ref 1.7–7)
NEUTROPHILS NFR BLD AUTO: 73.8 % (ref 42.7–76)
NRBC BLD AUTO-RTO: 0 /100 WBC (ref 0–0.2)
PLATELET # BLD AUTO: 173 10*3/MM3 (ref 140–450)
PMV BLD AUTO: 9 FL (ref 6–12)
POTASSIUM SERPL-SCNC: 4 MMOL/L (ref 3.5–5.2)
PROT SERPL-MCNC: 6.8 G/DL (ref 6–8.5)
RBC # BLD AUTO: 3.6 10*6/MM3 (ref 4.14–5.8)
SODIUM SERPL-SCNC: 144 MMOL/L (ref 136–145)
WBC NRBC COR # BLD: 9.27 10*3/MM3 (ref 3.4–10.8)

## 2021-12-01 PROCEDURE — 25010000002 CARBOPLATIN PER 50 MG: Performed by: INTERNAL MEDICINE

## 2021-12-01 PROCEDURE — 96367 TX/PROPH/DG ADDL SEQ IV INF: CPT

## 2021-12-01 PROCEDURE — 25010000002 HEPARIN LOCK FLUSH PER 10 UNITS: Performed by: INTERNAL MEDICINE

## 2021-12-01 PROCEDURE — 25010000002 DEXAMETHASONE SODIUM PHOSPHATE 20 MG/5ML SOLUTION: Performed by: INTERNAL MEDICINE

## 2021-12-01 PROCEDURE — 96375 TX/PRO/DX INJ NEW DRUG ADDON: CPT

## 2021-12-01 PROCEDURE — 25010000002 ETOPOSIDE 500 MG/25ML SOLUTION 25 ML VIAL: Performed by: INTERNAL MEDICINE

## 2021-12-01 PROCEDURE — 96413 CHEMO IV INFUSION 1 HR: CPT

## 2021-12-01 PROCEDURE — 25010000002 PALONOSETRON PER 25 MCG: Performed by: INTERNAL MEDICINE

## 2021-12-01 PROCEDURE — 80053 COMPREHEN METABOLIC PANEL: CPT | Performed by: INTERNAL MEDICINE

## 2021-12-01 PROCEDURE — 25010000002 FOSAPREPITANT PER 1 MG: Performed by: INTERNAL MEDICINE

## 2021-12-01 PROCEDURE — 96417 CHEMO IV INFUS EACH ADDL SEQ: CPT

## 2021-12-01 PROCEDURE — 85025 COMPLETE CBC W/AUTO DIFF WBC: CPT | Performed by: INTERNAL MEDICINE

## 2021-12-01 RX ORDER — OLANZAPINE 5 MG/1
5 TABLET ORAL ONCE
Status: COMPLETED | OUTPATIENT
Start: 2021-12-01 | End: 2021-12-01

## 2021-12-01 RX ORDER — PALONOSETRON 0.05 MG/ML
0.25 INJECTION, SOLUTION INTRAVENOUS ONCE
Status: COMPLETED | OUTPATIENT
Start: 2021-12-01 | End: 2021-12-01

## 2021-12-01 RX ORDER — HEPARIN SODIUM (PORCINE) LOCK FLUSH IV SOLN 100 UNIT/ML 100 UNIT/ML
500 SOLUTION INTRAVENOUS AS NEEDED
Status: CANCELLED | OUTPATIENT
Start: 2021-12-02

## 2021-12-01 RX ORDER — SODIUM CHLORIDE 0.9 % (FLUSH) 0.9 %
10 SYRINGE (ML) INJECTION AS NEEDED
Status: CANCELLED | OUTPATIENT
Start: 2021-12-02

## 2021-12-01 RX ORDER — SODIUM CHLORIDE 9 MG/ML
250 INJECTION, SOLUTION INTRAVENOUS ONCE
Status: COMPLETED | OUTPATIENT
Start: 2021-12-01 | End: 2021-12-01

## 2021-12-01 RX ORDER — SODIUM CHLORIDE 0.9 % (FLUSH) 0.9 %
10 SYRINGE (ML) INJECTION AS NEEDED
Status: DISCONTINUED | OUTPATIENT
Start: 2021-12-01 | End: 2021-12-01 | Stop reason: HOSPADM

## 2021-12-01 RX ORDER — HEPARIN SODIUM (PORCINE) LOCK FLUSH IV SOLN 100 UNIT/ML 100 UNIT/ML
500 SOLUTION INTRAVENOUS AS NEEDED
Status: DISCONTINUED | OUTPATIENT
Start: 2021-12-01 | End: 2021-12-01 | Stop reason: HOSPADM

## 2021-12-01 RX ADMIN — SODIUM CHLORIDE, PRESERVATIVE FREE 10 ML: 5 INJECTION INTRAVENOUS at 12:20

## 2021-12-01 RX ADMIN — Medication 500 UNITS: at 12:20

## 2021-12-01 RX ADMIN — CARBOPLATIN 380 MG: 10 INJECTION, SOLUTION INTRAVENOUS at 11:34

## 2021-12-01 RX ADMIN — PALONOSETRON HYDROCHLORIDE 0.25 MG: 0.25 INJECTION INTRAVENOUS at 09:28

## 2021-12-01 RX ADMIN — OLANZAPINE 5 MG: 5 TABLET, FILM COATED ORAL at 09:32

## 2021-12-01 RX ADMIN — DEXAMETHASONE SODIUM PHOSPHATE 12 MG: 4 INJECTION, SOLUTION INTRAMUSCULAR; INTRAVENOUS at 09:30

## 2021-12-01 RX ADMIN — SODIUM CHLORIDE 150 MG: 9 INJECTION, SOLUTION INTRAVENOUS at 09:48

## 2021-12-01 RX ADMIN — SODIUM CHLORIDE 250 ML: 9 INJECTION, SOLUTION INTRAVENOUS at 09:28

## 2021-12-01 RX ADMIN — ETOPOSIDE 180 MG: 20 INJECTION, SOLUTION, CONCENTRATE INTRAVENOUS at 10:27

## 2021-12-02 ENCOUNTER — INFUSION (OUTPATIENT)
Dept: ONCOLOGY | Facility: HOSPITAL | Age: 71
End: 2021-12-02

## 2021-12-02 VITALS
SYSTOLIC BLOOD PRESSURE: 126 MMHG | OXYGEN SATURATION: 98 % | BODY MASS INDEX: 25.53 KG/M2 | RESPIRATION RATE: 18 BRPM | WEIGHT: 163 LBS | TEMPERATURE: 98 F | HEART RATE: 88 BPM | DIASTOLIC BLOOD PRESSURE: 63 MMHG

## 2021-12-02 DIAGNOSIS — T45.1X5A CHEMOTHERAPY INDUCED NEUTROPENIA (HCC): ICD-10-CM

## 2021-12-02 DIAGNOSIS — C66.1 URETERAL CARCINOMA, RIGHT (HCC): ICD-10-CM

## 2021-12-02 DIAGNOSIS — R79.89 ELEVATED SERUM CREATININE: Primary | ICD-10-CM

## 2021-12-02 DIAGNOSIS — D70.1 CHEMOTHERAPY INDUCED NEUTROPENIA (HCC): ICD-10-CM

## 2021-12-02 DIAGNOSIS — C67.9 SMALL CELL CARCINOMA OF BLADDER (HCC): ICD-10-CM

## 2021-12-02 PROCEDURE — 25010000002 DEXAMETHASONE SODIUM PHOSPHATE 20 MG/5ML SOLUTION: Performed by: INTERNAL MEDICINE

## 2021-12-02 PROCEDURE — 96375 TX/PRO/DX INJ NEW DRUG ADDON: CPT

## 2021-12-02 PROCEDURE — 25010000002 ETOPOSIDE 500 MG/25ML SOLUTION 25 ML VIAL: Performed by: INTERNAL MEDICINE

## 2021-12-02 PROCEDURE — 96413 CHEMO IV INFUSION 1 HR: CPT

## 2021-12-02 PROCEDURE — 25010000002 HEPARIN LOCK FLUSH PER 10 UNITS: Performed by: INTERNAL MEDICINE

## 2021-12-02 RX ORDER — SODIUM CHLORIDE 0.9 % (FLUSH) 0.9 %
10 SYRINGE (ML) INJECTION AS NEEDED
Status: CANCELLED | OUTPATIENT
Start: 2021-12-03

## 2021-12-02 RX ORDER — SODIUM CHLORIDE 9 MG/ML
250 INJECTION, SOLUTION INTRAVENOUS ONCE
Status: COMPLETED | OUTPATIENT
Start: 2021-12-02 | End: 2021-12-02

## 2021-12-02 RX ORDER — HEPARIN SODIUM (PORCINE) LOCK FLUSH IV SOLN 100 UNIT/ML 100 UNIT/ML
500 SOLUTION INTRAVENOUS AS NEEDED
Status: CANCELLED | OUTPATIENT
Start: 2021-12-03

## 2021-12-02 RX ORDER — HEPARIN SODIUM (PORCINE) LOCK FLUSH IV SOLN 100 UNIT/ML 100 UNIT/ML
500 SOLUTION INTRAVENOUS AS NEEDED
Status: DISCONTINUED | OUTPATIENT
Start: 2021-12-02 | End: 2021-12-02 | Stop reason: HOSPADM

## 2021-12-02 RX ORDER — SODIUM CHLORIDE 0.9 % (FLUSH) 0.9 %
10 SYRINGE (ML) INJECTION AS NEEDED
Status: DISCONTINUED | OUTPATIENT
Start: 2021-12-02 | End: 2021-12-02 | Stop reason: HOSPADM

## 2021-12-02 RX ADMIN — DEXAMETHASONE SODIUM PHOSPHATE 12 MG: 4 INJECTION, SOLUTION INTRAMUSCULAR; INTRAVENOUS at 12:06

## 2021-12-02 RX ADMIN — SODIUM CHLORIDE, PRESERVATIVE FREE 10 ML: 5 INJECTION INTRAVENOUS at 14:00

## 2021-12-02 RX ADMIN — ETOPOSIDE 180 MG: 20 INJECTION, SOLUTION, CONCENTRATE INTRAVENOUS at 12:32

## 2021-12-02 RX ADMIN — Medication 500 UNITS: at 14:00

## 2021-12-02 RX ADMIN — SODIUM CHLORIDE 250 ML: 9 INJECTION, SOLUTION INTRAVENOUS at 12:06

## 2021-12-03 ENCOUNTER — INFUSION (OUTPATIENT)
Dept: ONCOLOGY | Facility: HOSPITAL | Age: 71
End: 2021-12-03

## 2021-12-03 VITALS
WEIGHT: 165.2 LBS | OXYGEN SATURATION: 98 % | HEART RATE: 66 BPM | SYSTOLIC BLOOD PRESSURE: 133 MMHG | RESPIRATION RATE: 18 BRPM | BODY MASS INDEX: 25.87 KG/M2 | DIASTOLIC BLOOD PRESSURE: 68 MMHG | TEMPERATURE: 98 F

## 2021-12-03 DIAGNOSIS — C66.1 URETERAL CARCINOMA, RIGHT (HCC): ICD-10-CM

## 2021-12-03 DIAGNOSIS — C67.9 SMALL CELL CARCINOMA OF BLADDER (HCC): Primary | ICD-10-CM

## 2021-12-03 DIAGNOSIS — R79.89 ELEVATED SERUM CREATININE: ICD-10-CM

## 2021-12-03 DIAGNOSIS — T45.1X5A CHEMOTHERAPY INDUCED NEUTROPENIA (HCC): ICD-10-CM

## 2021-12-03 DIAGNOSIS — D70.1 CHEMOTHERAPY INDUCED NEUTROPENIA (HCC): ICD-10-CM

## 2021-12-03 PROCEDURE — 96377 APPLICATON ON-BODY INJECTOR: CPT

## 2021-12-03 PROCEDURE — 25010000002 HEPARIN LOCK FLUSH PER 10 UNITS: Performed by: INTERNAL MEDICINE

## 2021-12-03 PROCEDURE — 25010000002 PEGFILGRASTIM 6 MG/0.6ML PREFILLED SYRINGE KIT: Performed by: INTERNAL MEDICINE

## 2021-12-03 PROCEDURE — 25010000002 ETOPOSIDE 500 MG/25ML SOLUTION 25 ML VIAL: Performed by: INTERNAL MEDICINE

## 2021-12-03 PROCEDURE — 96375 TX/PRO/DX INJ NEW DRUG ADDON: CPT

## 2021-12-03 PROCEDURE — 96413 CHEMO IV INFUSION 1 HR: CPT

## 2021-12-03 PROCEDURE — 25010000002 DEXAMETHASONE SODIUM PHOSPHATE 20 MG/5ML SOLUTION: Performed by: INTERNAL MEDICINE

## 2021-12-03 RX ORDER — HEPARIN SODIUM (PORCINE) LOCK FLUSH IV SOLN 100 UNIT/ML 100 UNIT/ML
500 SOLUTION INTRAVENOUS AS NEEDED
Status: DISCONTINUED | OUTPATIENT
Start: 2021-12-03 | End: 2021-12-03 | Stop reason: HOSPADM

## 2021-12-03 RX ORDER — HEPARIN SODIUM (PORCINE) LOCK FLUSH IV SOLN 100 UNIT/ML 100 UNIT/ML
500 SOLUTION INTRAVENOUS AS NEEDED
OUTPATIENT
Start: 2022-01-19

## 2021-12-03 RX ORDER — SODIUM CHLORIDE 0.9 % (FLUSH) 0.9 %
10 SYRINGE (ML) INJECTION AS NEEDED
Status: DISCONTINUED | OUTPATIENT
Start: 2021-12-03 | End: 2021-12-03 | Stop reason: HOSPADM

## 2021-12-03 RX ORDER — SODIUM CHLORIDE 0.9 % (FLUSH) 0.9 %
10 SYRINGE (ML) INJECTION AS NEEDED
OUTPATIENT
Start: 2022-01-19

## 2021-12-03 RX ORDER — SODIUM CHLORIDE 9 MG/ML
250 INJECTION, SOLUTION INTRAVENOUS ONCE
Status: COMPLETED | OUTPATIENT
Start: 2021-12-03 | End: 2021-12-03

## 2021-12-03 RX ADMIN — PEGFILGRASTIM 6 MG: KIT SUBCUTANEOUS at 13:10

## 2021-12-03 RX ADMIN — Medication 500 UNITS: at 13:12

## 2021-12-03 RX ADMIN — DEXAMETHASONE SODIUM PHOSPHATE 12 MG: 4 INJECTION, SOLUTION INTRAMUSCULAR; INTRAVENOUS at 11:31

## 2021-12-03 RX ADMIN — ETOPOSIDE 180 MG: 20 INJECTION, SOLUTION, CONCENTRATE INTRAVENOUS at 11:57

## 2021-12-03 RX ADMIN — SODIUM CHLORIDE 250 ML: 9 INJECTION, SOLUTION INTRAVENOUS at 11:31

## 2021-12-03 RX ADMIN — SODIUM CHLORIDE, PRESERVATIVE FREE 10 ML: 5 INJECTION INTRAVENOUS at 13:12

## 2021-12-07 ENCOUNTER — HOSPITAL ENCOUNTER (OUTPATIENT)
Dept: CT IMAGING | Facility: HOSPITAL | Age: 71
Discharge: HOME | End: 2021-12-07

## 2021-12-07 DIAGNOSIS — C67.9 SMALL CELL CARCINOMA OF BLADDER (HCC): ICD-10-CM

## 2021-12-07 PROCEDURE — 71250 CT THORAX DX C-: CPT | Performed by: RADIOLOGY

## 2021-12-07 PROCEDURE — 74176 CT ABD & PELVIS W/O CONTRAST: CPT

## 2021-12-07 PROCEDURE — 74176 CT ABD & PELVIS W/O CONTRAST: CPT | Performed by: RADIOLOGY

## 2021-12-07 PROCEDURE — 71250 CT THORAX DX C-: CPT

## 2021-12-07 NOTE — PROGRESS NOTES
NAME: Alycia De La Rosa    : 1950    DATE OF FOLLOW UP:  2021    DIAGNOSIS:   Small cell bladder cancer, Limited stage disease    TREATMENT HISTORY:  1.         CHIEF COMPLAINT:  Follow up Bladder cancer      HISTORY OF PRESENT ILLNESS:   Alycia De La Rosa is a very pleasant 71 y.o. male who is being seen today at the request of Dr. Xavier Arnold for evaluation and treatment of small cell bladder cancer. He first developed gross hematuria in 2021 and was seen by his PCP Dr. Alec Soto.  He had imaging revealing hydronephrosis due to obstruction of the R ureteral orifice with non-functioning R kidney and was referred to Dr. Link who performed cystoscopy with biopsy on 3-17-21.  Cystoscopy revealed a normal urethra nd prostate with indented R ureteral orfice with obvious tumor behind it.  He performed transurethral resection until obvious ureter and sent tumor for pathologic examination.  This showed high grade urothelial carcinoma .  Muscularis propria was present but uninvolved.  Dr. Link referred him to Dr. Sylvester for consideration of R nephrectomy / ureterectomy.  Mr. De La Rosa was referred to Dr. Sylvester who performed repeat cystoscopy 21.  This revealed a large mass pushing into the bladder mucosa / external compression with mass in the R saad-trigone and R bladder wall.  R ureteral orifice could not be identified.  A 10x8 cm mass was resected.  PAthology showed small cell carcinoma with invasion of the muscularis propria as well as focal conventional high grade urothelial carcinoma.  Dr. Sylvester referred him to Dr. Xavier Arnold given small cell histology.  Dr. Lott recommended imaging for staging and treatment with Carbo/Etoposide/Atezolizumab. He was referred here because he preferred to receive treatment close to home.    Mr. De La Rosa presents today with his daughter (who works with Dr. Otoole).  He is overall doing well.  He has had 2 episodes of gross hematuria in total, but this hasn't been a  persistent problem.  He denies weight loss.  No chest pain or shortness of breath. He complains of RLQ pelvic pain which radiates into his R hip/ R upper thigh.  He has been using Tylenol for pain which he says is sufficient, but he is clearly in pain in the office today and is unable to sit in the chair because of pain.         INTERVAL HISTORY:   Mr. De La Rosa is here today for follow up of small cell bladder cancer and C5 of Carbo/Etoposide. He continues to tolerate his treatment well. He denies pain and hasn't had to use pain medication. He denies hematuria.  He has no concerns or complaints today.  He does note that he has some numbness in the RLE.  He says he thinks he would prefer radiation to surgery.        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       PAST SURGICAL HISTORY:  Past Surgical History:   Procedure Laterality Date   • CYSTOSCOPY RETROGRADE PYELOGRAM Right 3/17/2021    Procedure: CYSTOSCOPY RETROGRADE PYELOGRAM;  Surgeon: Dennis Link MD;  Location: [facility];  Service: Urology;  Laterality: Right;   • CYSTOSCOPY URETEROSCOPY Bilateral 3/17/2021    Procedure: CYSTOSCOPY TRANSURETHRAL RESECTION OF BLADDER TUMOR;  Surgeon: Dennis Link MD;  Location: [facility] OR;  Service: Urology;  Laterality: Bilateral;   • HYDROCELE EXCISION / REPAIR      x2   • VENOUS ACCESS DEVICE (PORT) INSERTION Left 8/4/2021    Procedure: INSERTION VENOUS ACCESS DEVICE;  Surgeon: Katy Cool MD;  Location: [facility];  Service: General;  Laterality: Left;       FAMILY HISTORY:  Family History   Problem Relation Age of Onset   • Cancer Father 84        prostate   • Cancer Mother 73        mouth   • Cancer  Maternal Aunt         mouth   • Cancer Maternal Grandfather          SOCIAL HISTORY:  Social History     Socioeconomic History   • Marital status:    Tobacco Use   • Smoking status: Former Smoker     Packs/day: 3.00     Years: 5.00     Pack years: 15.00     Types: Cigarettes     Quit date:      Years since quittin.9   • Smokeless tobacco: Former User   Vaping Use   • Vaping Use: Never used   Substance and Sexual Activity   • Alcohol use: Not Currently   • Drug use: Never   • Sexual activity: Defer     Social History     Social History Narrative    He used to work as a  and denies any unusual chemical exposures.        REVIEW OF SYSTEMS:   A comprehensive 14 point review of systems was performed.  Significant findings as mentioned above.  All other systems reviewed and are negative.      MEDICATIONS:  The current medication list was reviewed in the EMR    Current Outpatient Medications:   •  amLODIPine (NORVASC) 10 MG tablet, 10 mg., Disp: , Rfl:   •  hydroCHLOROthiazide (HYDRODIURIL) 12.5 MG tablet, 12.5 mg., Disp: , Rfl:   •  HYDROcodone-acetaminophen (NORCO)  MG per tablet, Take 1 tablet by mouth Every 4 (Four) Hours As Needed for Moderate Pain, Disp: 12 tablet, Rfl: 0  •  HYDROcodone-acetaminophen (Norco) 7.5-325 MG per tablet, Take 1 tablet by mouth 4 (Four) Times a Day As Needed for Moderate Pain ., Disp: 8 tablet, Rfl: 0  •  lidocaine-prilocaine (EMLA) 2.5-2.5 % cream, Apply generous amount to port site 30-45 minutes prior to use and cover with plastic wrap, Disp: 30 g, Rfl: 5  •  metoprolol tartrate (LOPRESSOR) 50 MG tablet, 50 mg., Disp: , Rfl:   •  Morphine (MS CONTIN) 15 MG 12 hr tablet, Take 1 tablet by mouth Every 8 (Eight) Hours., Disp: 90 tablet, Rfl: 0  •  OLANZapine (zyPREXA) 5 MG tablet, Take by mouth on days 2, 3 and 4 after chemotherapy., Disp: 3 tablet, Rfl: 5  •  OLANZapine (zyPREXA) 5 MG tablet, Take 1 tablet by mouth Every Night. Take on days 2, 3 and 4 after  chemotherapy., Disp: 3 tablet, Rfl: 5  •  ondansetron (ZOFRAN) 8 MG tablet, Take 1 tablet by mouth Every 8 (Eight) Hours As Needed for Nausea or Vomiting., Disp: 30 tablet, Rfl: 1  •  ondansetron (ZOFRAN) 8 MG tablet, Take 1 tablet by mouth 3 (Three) Times a Day As Needed for Nausea or Vomiting., Disp: 30 tablet, Rfl: 5  •  oxyCODONE (ROXICODONE) 5 MG immediate release tablet, Take 1-2 tabs every 4-6 hours as needed for pain, Disp: 180 tablet, Rfl: 0  •  prochlorperazine (COMPAZINE) 10 MG tablet, Take 1 tablet by mouth Every 6 (Six) Hours As Needed for Nausea or Vomiting., Disp: 60 tablet, Rfl: 1  •  sennosides-docusate (Senna-S) 8.6-50 MG per tablet, Take 2 tablets by mouth 2 (Two) Times a Day., Disp: 120 tablet, Rfl: 5  No current facility-administered medications for this visit.    Facility-Administered Medications Ordered in Other Visits:   •  heparin 100 UNIT/ML injection  - ADS Override Pull, , , ,     ALLERGIES:    Allergies   Allergen Reactions   • Lisinopril Anaphylaxis and Rash   • Penicillins Anaphylaxis and Rash       PHYSICAL EXAM:  Vitals:    12/08/21 1046   BP: 116/63   Pulse: 70   Resp: 18   Temp: 97.2 °F (36.2 °C)   SpO2: 98%     Pain Score    12/08/21 1046   PainSc: 0-No pain   ECOG score: 0     General:  Awake, alert and oriented, appears well. In good spirits  HEENT:  Pupils are equal, round and reactive to light and accommodation, Extra-ocular movements full, Oropharyx clear, mucous membranes moist  Neck:  No JVD, thyromegaly or lymphadenopathy  CV:  Regular rate and rhythm, no murmurs, rubs or gallops  Resp:  Lungs are clear to auscultation bilaterally, no crackles  Abd:  Soft, non-tender, non-distended, bowel sounds present, no organomegaly or masses  Ext:  No clubbing, cyanosis or edema  Lymph:  No cervical, supraclavicular, axillary adenopathy  Neuro: Grossly non-focal exam      PATHOLOGY:  03-17-21 04-20-21 06-14-21          ENDOSCOPY:  Cystoscopy 03-17-21  ([facility]):  Findings: Probable right distal ureteral tumor with nonfunctioning kidney and massive dilation      Cystourethroscopy, TURP 06-14-21 ([facility])  Findings:   1.Left ureteral orifice identified. Retrograde pyelography without hydronephrosis or evidence of filling defect.   2. Large mass pushing into bladder mucosa/external compression- no obvious urothelial papillar lesion. Mass located in the right saad-trigone and right bladder wall. Inability to identify right ureteral orifice  3. Resection of mass with resection size of 10cm x 8 cm.   4. Hemostasis achieved at conclusion of case  5. Placement of 20 F 2 way catheter         IMAGING:  CT Chest 04-12-21  FINDINGS:    LUNGS:  Unremarkable.  No mass.  No consolidation.    PLEURAL SPACE:  Unremarkable.  No pneumothorax.  No significant  effusion.    HEART:  Unremarkable.  No cardiomegaly.  No significant pericardial  effusion.    MEDIASTINUM:  Moderate-sized hiatal hernia.    BONES/JOINTS:  Unremarkable.  No acute fracture.  No dislocation.    SOFT TISSUES:  Unremarkable.    VASCULATURE:  Unremarkable.  No thoracic aortic aneurysm.    LYMPH NODES:  Unremarkable.  No enlarged lymph nodes.    LIVER:  Left lobe of liver cyst measuring 3 cm.    KIDNEYS AND URETERS:  Incidentally imaged right upper kidney shows  moderate hydronephrosis and cortical thinning.     IMPRESSION:    Moderate-sized hiatal hernia.        PET/CT 08-06-21  FINDINGS:      HEAD/NECK:  No FDG hypermetabolic neck adenopathy.  No FDG hypermetabolic masses.     CHEST:   No FDG hypermetabolic thoracic adenopathy.  No FDG hypermetabolic lung nodules or masses.  Evaluation for tiny parenchymal nodules is somewhat limited on low dose  CT secondary to respiratory motion.     ABDOMEN/PELVIS:   The right kidney shows cortical thinning and the right collecting system  is diffusely dilated to the level of the urinary bladder where the wall  is thickened. The left kidney shows diffuse left-sided dilatation  as  well.  The pelvis shows heterogeneous soft tissue mass which appears to be  intimate with the prostate. Significantly larger today than on the  previous CT scan.  Large right-sided hydrocele is present.  There is hypermetabolic activity throughout the dilated left ureter as  well as in the left renal pelvis. This likely represents excreted  radiotracer.  Extensive increased activity intermixed in the heterogeneous appearance  of the prostate.     BONES: There are scattered foci of FDG hypermetabolism most suggestive  of degenerative change seen throughout the axial skeleton. If concern  persist for metastatic lesions of the bone, HDP Bone scan is recommended  for further evaluation.     IMPRESSION:     1. Hydronephrosis bilaterally and hydroureter bilaterally to the level  of a thickened urinary bladder.  2. Diffuse increased activity throughout the left collecting system as  well as in the bladder.  3. Heterogeneous soft tissue mass in the pelvis intimate with the  prostate. The periphery does show hypermetabolic activity. Cannot  completely separate this activity from the bladder. Maximum SUV is  9.656. Urologic follow-up is suggested  4. No other areas of abnormal hypermetabolic activity.      CTCAP 10-18-21  CT FINDINGS: The lungs are generally hyperinflated. No pulmonary  parenchymal lung nodules or masses are identified. There were no pleural  effusions or inflammatory infiltrates. No masses or enlarged lymph nodes  are noted in the mediastinum or hilar areas.     IMPRESSION:  No CT findings to suggest metastatic disease.       FINDINGS: The liver was remarkable for several hepatic cysts that are  stable in comparing with the previous exam. No focal abnormalities were  seen in the spleen. The pancreas was unremarkable. The kidneys show  marked hydronephrosis involving the collecting system of the right  kidney with marked thinning of the cortical tissue. The right ureter is  also dilated. There is a mass  in the area of the trigone of the bladder  that extends into the soft tissues of the retroperitoneum on the right  side. This is concerning for a large bladder tumor. The collecting  system of the left kidney was unremarkable. The aorta is normal in  caliber. There were no enlarged lymph nodes in the retroperitoneum or  mesentery. The bowel shows no evidence of obstruction. There were no  ventral hernias.     IMPRESSION:  Marked hydronephrosis and hydroureter involving the right  kidney, felt to be due to long-standing obstruction. There is diffuse  thinning of the cortical tissue. There is a large mass in the bladder  base on the right side with calcifications extending outside the bladder  into the pelvic soft tissues. The left kidney was unremarkable.      CTCAP without contrast 12-07-21  FINDINGS:     LUNGS: Unremarkable. No parenchymal soft tissue nodules.  No focal air  space disease.     HEART: Unremarkable.     MEDIASTINUM: No masses. No enlarged lymph nodes.  No fluid collections.     PLEURA: No pleural effusion. No pleural mass or abnormal calcification.  No pneumothorax.     VASCULATURE: No evidence of aneurysm.     BONES: No acute bony abnormality.     VISUALIZED UPPER ABDOMEN:Please see the CT report for the abdomen and  pelvis.     Other: None.     IMPRESSION:     1. No evidence of metastatic disease to the chest.  2. Other findings as above.    FINDINGS:     Lower thorax: Clear. No effusions.     Abdomen:     Liver: Low-attenuation lesions in the liver are stable and have an  appearance most suggestive of hepatic cysts.     Gallbladder: No dilation or stone identified.     Pancreas: Unremarkable. No mass or ductal dilatation.     Spleen: Homogeneous. No splenomegaly.     Adrenals: No mass.     Kidneys/ureters: Right-sided hydronephrosis and hydroureter with  right-sided cortical thinning. This is stable from the previous exam.     GI tract: Non-dilated. No definite wall thickening.. There is  no  evidence of appendicitis     MESENTERY: No free fluid, walled off fluid collections, mesenteric  stranding, or enlarged lymph nodes         Vasculature: No evidence of aneurysm.     Abdominal wall: There is a right inguinal hernia which does contain  bowel and fluid.     Bladder: There is a mass in the right posterior aspect of the bladder  with calcifications. The mass does appear to extend into the adjacent  soft tissues and is very similar to the previous exam. This does appear  to be the culprit for the upstream dilatation on the right.     Reproductive: Prostate enlargement.     Bones: No acute bony abnormality.     IMPRESSION:     1. Right-sided hydronephrosis and hydroureter due to a bladder wall  mass.     2.Right inguinal hernia containing bowel. No evidence of obstruction at  this time.     3. Prostate enlargement.     4. Other findings as above.      RECENT LABS:  Lab Results   Component Value Date    WBC 18.19 (H) 12/08/2021    HGB 11.1 (L) 12/08/2021    HCT 34.1 (L) 12/08/2021    .3 (H) 12/08/2021    RDW 14.0 12/08/2021    PLT 63 (L) 12/08/2021    NEUTRORELPCT 73.8 12/01/2021    LYMPHORELPCT 14.3 (L) 12/01/2021    MONORELPCT 9.2 12/01/2021    EOSRELPCT 0.5 12/01/2021    BASORELPCT 0.6 12/01/2021    NEUTROABS 16.37 (H) 12/08/2021    LYMPHSABS 1.33 12/01/2021       Lab Results   Component Value Date     12/08/2021    K 4.2 12/08/2021    CO2 24.4 12/08/2021     12/08/2021    BUN 22 12/08/2021    CREATININE 1.10 12/08/2021    EGFRIFNONA 66 12/08/2021    GLUCOSE 115 (H) 12/08/2021    CALCIUM 8.7 12/08/2021    ALKPHOS 177 (H) 12/08/2021    AST 14 12/08/2021    ALT 12 12/08/2021    BILITOT 0.3 12/08/2021    ALBUMIN 4.02 12/08/2021    PROTEINTOT 6.1 12/08/2021     Lab Results   Component Value Date    FERRITIN 823.40 (H) 07/19/2021    IRON 79 07/19/2021    TIBC 305 07/19/2021    LABIRON 26 07/19/2021    [id number] 437 07/19/2021    FOLATE 9.52 07/19/2021       Lab Results   Component  Value Date/Time     (H) 08/26/2021 09:23 AM    URICACID 5.3 08/26/2021 09:23 AM         ASSESSMENT & PLAN:  Alycia De La Rosa is a very pleasant 71 y.o. 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    1.  Small Cell Carcinoma of the Bladder:  -  Appears to have localized disease.   -  Clinically has [id number] disease with involvement of the R pelvic sidewall.    -  Given limited stage disease, recommended treatment with Carboplatin/Etoposide followed by either cystectomy or radiation.  Used Carboplatin over Cisplatin given solitary functional kidney.  - He has completed 6 cycles of carboplatin/etoposide. Overall, he tolerated the treatment exceptionally well without any significant side effects.   - After 3 cycles carboplatin/etoposide imaging showed a significant response to treatment.  I reviewed images personally and with Dr. Mejia. He previously had fabi hydronephrosis and after 3 cycles of treatment, the L ureter/kidney were free / unobstructed.  The mass was about 1/3rd the size of its pre-treatment value. That said, there was still extension of the mass beyond the bladder.  After completion of 6 cycles of therapy he unfortunately continues to have extention to the soft tissues of the pelvic side wall.  Given this, I suspect he will be a better candidate for radiation than for surgery.  I recommended he see Dr. Harry and Dr. Sylvester to discuss further, but he says he thinks he would prefer radiation to surgery and said he would like to see Dr. Harry first and then go from there.  -  Will refer to Dr. Harry for evaluation and treatment.  -  I will plan to see him back in January.     2.  Neoplasm related pain:  - Previously on Morphine ER 15 mg q8h and Oxycodone 5 mg q4h as needed. He reports that pain has now resolved and he isn't needing to take any pain medicine.  He has mild numbness of the RLE  which I suspect may be related to local effect of his cancer.    3.  Prophlaxis:  He hasn't had Prevnar 13, 2021 influenza or COVID 19 vaccinations.  He says that with PCN allergy (anaphylaxis) he has been very hesitant to get vaccinations.  Explained that none of the vaccines contain PCN but he still declined.    4.  Follow up:   -  Refer to Dr. Harry for discussion of radiation.  -  F/u with me in January.      5.  ACO / MERLY/Other  Quality measures  -  Alycia De La Rosa did not receive 2021 flu vaccine. This was recommended but declined.  -  Alycia De La Rosa reports a pain score of 0.   -  Current outpatient and discharge medications have been reconciled for the patient.  Reviewed by: Jade Mathis MD     This note was scribed for Jade Mathis MD by Ana Harper RN.    I, Jade Mathis MD, personally performed the services described in this documentation as scribed by the above named individual in my presence, and it is both accurate and complete.  12/08/2021     I spent 30 minutes with Alycia De La Rosa today.  In the office today, more than 50% of this time was spent face-to-face with him  in counseling / coordination of care, reviewing his medical history and counseling on the current treatment plan.  All questions were answered to his satisfaction      Electronically Signed by: Jade Mathis MD       CC:     MD Xavier Garza MD Barbara Michna, MD Stephen E. Strup, MD

## 2021-12-08 ENCOUNTER — OFFICE VISIT (OUTPATIENT)
Dept: ONCOLOGY | Facility: CLINIC | Age: 71
End: 2021-12-08

## 2021-12-08 ENCOUNTER — LAB (OUTPATIENT)
Dept: ONCOLOGY | Facility: CLINIC | Age: 71
End: 2021-12-08

## 2021-12-08 VITALS
HEART RATE: 70 BPM | WEIGHT: 162 LBS | TEMPERATURE: 97.2 F | OXYGEN SATURATION: 98 % | HEIGHT: 67 IN | DIASTOLIC BLOOD PRESSURE: 63 MMHG | BODY MASS INDEX: 25.43 KG/M2 | RESPIRATION RATE: 18 BRPM | SYSTOLIC BLOOD PRESSURE: 116 MMHG

## 2021-12-08 DIAGNOSIS — C67.9 SMALL CELL CARCINOMA OF BLADDER (HCC): Primary | ICD-10-CM

## 2021-12-08 DIAGNOSIS — C67.9 SMALL CELL CARCINOMA OF BLADDER (HCC): ICD-10-CM

## 2021-12-08 DIAGNOSIS — N13.9 OBSTRUCTIVE UROPATHY: ICD-10-CM

## 2021-12-08 DIAGNOSIS — N18.30 STAGE 3 CHRONIC KIDNEY DISEASE, UNSPECIFIED WHETHER STAGE 3A OR 3B CKD (HCC): ICD-10-CM

## 2021-12-08 DIAGNOSIS — C66.1 URETERAL CARCINOMA, RIGHT (HCC): ICD-10-CM

## 2021-12-08 LAB
ALBUMIN SERPL-MCNC: 4.02 G/DL (ref 3.5–5.2)
ALBUMIN/GLOB SERPL: 1.9 G/DL
ALP SERPL-CCNC: 177 U/L (ref 39–117)
ALT SERPL W P-5'-P-CCNC: 12 U/L (ref 1–41)
ANION GAP SERPL CALCULATED.3IONS-SCNC: 10.6 MMOL/L (ref 5–15)
AST SERPL-CCNC: 14 U/L (ref 1–40)
BILIRUB SERPL-MCNC: 0.3 MG/DL (ref 0–1.2)
BUN SERPL-MCNC: 22 MG/DL (ref 8–23)
BUN/CREAT SERPL: 20 (ref 7–25)
CALCIUM SPEC-SCNC: 8.7 MG/DL (ref 8.6–10.5)
CHLORIDE SERPL-SCNC: 105 MMOL/L (ref 98–107)
CO2 SERPL-SCNC: 24.4 MMOL/L (ref 22–29)
CREAT SERPL-MCNC: 1.1 MG/DL (ref 0.76–1.27)
DEPRECATED RDW RBC AUTO: 54 FL (ref 37–54)
DOHLE BODIES: PRESENT
ERYTHROCYTE [DISTWIDTH] IN BLOOD BY AUTOMATED COUNT: 14 % (ref 12.3–15.4)
GFR SERPL CREATININE-BSD FRML MDRD: 66 ML/MIN/1.73
GLOBULIN UR ELPH-MCNC: 2.1 GM/DL
GLUCOSE SERPL-MCNC: 115 MG/DL (ref 65–99)
HCT VFR BLD AUTO: 34.1 % (ref 37.5–51)
HGB BLD-MCNC: 11.1 G/DL (ref 13–17.7)
LYMPHOCYTES # BLD MANUAL: 1.46 10*3/MM3 (ref 0.7–3.1)
LYMPHOCYTES NFR BLD MANUAL: 2 % (ref 5–12)
MACROCYTES BLD QL SMEAR: ABNORMAL
MCH RBC QN AUTO: 33.9 PG (ref 26.6–33)
MCHC RBC AUTO-ENTMCNC: 32.6 G/DL (ref 31.5–35.7)
MCV RBC AUTO: 104.3 FL (ref 79–97)
MONOCYTES # BLD: 0.36 10*3/MM3 (ref 0.1–0.9)
NEUTROPHILS # BLD AUTO: 16.37 10*3/MM3 (ref 1.7–7)
NEUTROPHILS NFR BLD MANUAL: 84 % (ref 42.7–76)
NEUTS BAND NFR BLD MANUAL: 6 % (ref 0–5)
PLAT MORPH BLD: NORMAL
PLATELET # BLD AUTO: 63 10*3/MM3 (ref 140–450)
PMV BLD AUTO: 10.1 FL (ref 6–12)
POTASSIUM SERPL-SCNC: 4.2 MMOL/L (ref 3.5–5.2)
PROT SERPL-MCNC: 6.1 G/DL (ref 6–8.5)
RBC # BLD AUTO: 3.27 10*6/MM3 (ref 4.14–5.8)
SODIUM SERPL-SCNC: 140 MMOL/L (ref 136–145)
TOXIC GRANULATION: ABNORMAL
VARIANT LYMPHS NFR BLD MANUAL: 8 % (ref 19.6–45.3)
WBC NRBC COR # BLD: 18.19 10*3/MM3 (ref 3.4–10.8)

## 2021-12-08 PROCEDURE — 85025 COMPLETE CBC W/AUTO DIFF WBC: CPT | Performed by: INTERNAL MEDICINE

## 2021-12-08 PROCEDURE — 99214 OFFICE O/P EST MOD 30 MIN: CPT | Performed by: INTERNAL MEDICINE

## 2021-12-08 PROCEDURE — 85007 BL SMEAR W/DIFF WBC COUNT: CPT | Performed by: INTERNAL MEDICINE

## 2021-12-08 PROCEDURE — 80053 COMPREHEN METABOLIC PANEL: CPT | Performed by: INTERNAL MEDICINE

## 2021-12-15 ENCOUNTER — CONSULT (OUTPATIENT)
Dept: RADIATION ONCOLOGY | Facility: HOSPITAL | Age: 71
End: 2021-12-15

## 2021-12-15 ENCOUNTER — OFFICE VISIT (OUTPATIENT)
Dept: RADIATION ONCOLOGY | Facility: HOSPITAL | Age: 71
End: 2021-12-15

## 2021-12-15 VITALS
OXYGEN SATURATION: 99 % | HEART RATE: 75 BPM | BODY MASS INDEX: 25.37 KG/M2 | RESPIRATION RATE: 18 BRPM | TEMPERATURE: 98.4 F | WEIGHT: 162 LBS | DIASTOLIC BLOOD PRESSURE: 65 MMHG | SYSTOLIC BLOOD PRESSURE: 117 MMHG

## 2021-12-15 DIAGNOSIS — C67.9 SMALL CELL CARCINOMA OF BLADDER (HCC): ICD-10-CM

## 2021-12-15 PROCEDURE — 99204 OFFICE O/P NEW MOD 45 MIN: CPT | Performed by: RADIOLOGY

## 2021-12-15 PROCEDURE — G0463 HOSPITAL OUTPT CLINIC VISIT: HCPCS | Performed by: RADIOLOGY

## 2021-12-15 NOTE — PROGRESS NOTES
New Patient Office Consult      Patient Name: Alycia De La Rosa  : 1950   MRN: [id number]     Requesting Physician: Jade Mathis MD    Chief Complaint:    Chief Complaint   Patient presents with   • Bladder Cancer      Staging: [id number]    History of Present Illness: Alycia De La Rosa is a pleasant 71 y.o. male who is here today for consultation regarding radiation therapy.  He has a history significant for TIA, hydronephrosis, and hypertension.    Mr. De La Rosa developed gross hematuria in 2021 and presented to his PCP Dr. Soto.  Imaging at this time revealed hydronephrosis due to obstruction of the right urethral orifice with nonfunctioning right kidney and he was then referred to Dr. Link.  On 3/17/2021 Dr. Link performed a cystoscopy with biopsy.  This revealed a normal urethra and prostate with indented right urethral orifice with obvious tumor behind it.  He performed a transurethral resection until obvious ureter and stent tumor for pathologic examination pathology revealed high-grade urothelial carcinoma.  Muscularis propria was present but uninvolved.  Dr. Link referred him to Dr. Sylvester for consideration of right nephrectomy/ureterectomy.     On 2021 Dr. Sylvester plan to perform this procedure, but instead performed a repeat cystoscopy.  This time it revealed a large mass pushing into the bladder mucosa/external compression with mass in the right saad-trigone and right bladder wall.  Right urethral orifice could not be identified.  A 10 x 8 cm mass was resected.  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  From here he was referred to Dr. Xavier Arnold who performed a PET CT for staging and recommended treatment with carbo/etoposide/atezolizumab.  However, he did not want to be treated so far away from home which led to his referral to Dr. Mathis.     At this time he has completed 6  cycles of carboplatin/etoposide and did overall well with treatment.  After only 3 cycles he showed significant response to treatment on imaging.  He previously had had bilateral hydronephrosis but after his third cycle of treatment, the left ureter/kidney or free/unobstructed, and the mass was about one third the size of its pretreatment value.  However, after completing 6 cycles of therapy he unfortunately continued to have extension to the soft tissues of the pelvic sidewall.  Therefore, this is what led to his referral to our office today to further discuss if radiation will be a good route for him to take.    Today the patient is doing well.  His pain is under control.  He does have some pelvic pain on the right side.  No other complaints at this time.  He is not had any episodes of hematuria aside from the 2 episodes earlier in the year.    Subjective      Review of Systems:   Review of Systems   Constitutional: Negative.    HENT: Negative.    Eyes: Negative.    Respiratory: Negative.    Cardiovascular: Negative.    Gastrointestinal: Negative.  Negative for diarrhea.   Endocrine: Negative.    Genitourinary: Positive for pelvic pain. Negative for difficulty urinating, dysuria, frequency, hematuria, nocturia, urgency and urinary incontinence.   Musculoskeletal: Negative.    Skin: Negative.  Negative for color change.   Neurological: Negative.    Hematological: Negative.    Psychiatric/Behavioral: Negative.      Review of Systems   Constitutional: Negative.    HENT:  Negative.    Eyes: Negative.    Respiratory: Negative.    Cardiovascular: Negative.    Gastrointestinal: Negative.  Negative for diarrhea.   Endocrine: Negative.    Genitourinary: Positive for pelvic pain. Negative for bladder incontinence, difficulty urinating, dysuria, frequency, hematuria, nocturia and urgency.    Musculoskeletal: Negative.    Skin: Negative.  Negative for color change.   Neurological: Negative.    Hematological: Negative.     Psychiatric/Behavioral: Negative.        I have reviewed and confirmed the accuracy of the ROS as documented by the MA/LPN/RN BETITO Blank     Past Oncology History:   Oncology/Hematology History   Ureteral carcinoma, right (HCC)   3/25/2021 Initial Diagnosis    Ureteral carcinoma, right (CMS/HCC)     8/18/2021 -  Chemotherapy    OP CENTRAL VENOUS ACCESS DEVICE ACCESS, CARE, AND MAINTENANCE (CVAD)     Small cell carcinoma of bladder (HCC)   8/10/2021 Initial Diagnosis    Small cell carcinoma of bladder (CMS/HCC)     8/18/2021 -  Chemotherapy    OP Small Cell Bladder CARBOplatin AUC=5 / Etoposide     8/18/2021 -  Chemotherapy    OP CENTRAL VENOUS ACCESS DEVICE ACCESS, CARE, AND MAINTENANCE (CVAD)          Past Radiation History:  No previous exposures to ionizing radiation therapy and there are not relative contraindications including connective tissue disorder.     Past Medical History:   Past Medical History:   Diagnosis Date   • Arthritis    • Asthma    • GERD (gastroesophageal reflux disease)    • Hydronephrosis 3/4/2021   • Hypertension    • PONV (postoperative nausea and vomiting)    • Stroke (HCC)    • TIA (transient ischemic attack)     AGE 29   • Ureteral carcinoma, right (HCC) 3/25/2021       Past Surgical History:   Past Surgical History:   Procedure Laterality Date   • CYSTOSCOPY RETROGRADE PYELOGRAM Right 3/17/2021    Procedure: CYSTOSCOPY RETROGRADE PYELOGRAM;  Surgeon: Dennis Link MD;  Location: [facility];  Service: Urology;  Laterality: Right;   • CYSTOSCOPY URETEROSCOPY Bilateral 3/17/2021    Procedure: CYSTOSCOPY TRANSURETHRAL RESECTION OF BLADDER TUMOR;  Surgeon: Dennis Link MD;  Location: [facility];  Service: Urology;  Laterality: Bilateral;   • HYDROCELE EXCISION / REPAIR      x2   • VENOUS ACCESS DEVICE (PORT) INSERTION Left 8/4/2021    Procedure: INSERTION VENOUS ACCESS DEVICE;  Surgeon: Katy Cool MD;  Location: [facility] OR;  Service: General;   Laterality: Left;       Family History:   Family History   Problem Relation Age of Onset   • Cancer Father 84        prostate   • Cancer Mother 73        mouth   • Cancer Maternal Aunt         mouth   • Cancer Maternal Grandfather        Social History:   Social History     Socioeconomic History   • Marital status:    Tobacco Use   • Smoking status: Former Smoker     Packs/day: 3.00     Years: 5.00     Pack years: 15.00     Types: Cigarettes     Quit date:      Years since quittin.9   • Smokeless tobacco: Former User   Vaping Use   • Vaping Use: Never used   Substance and Sexual Activity   • Alcohol use: Not Currently   • Drug use: Never   • Sexual activity: Defer       Medications:     Current Outpatient Medications:   •  amLODIPine (NORVASC) 10 MG tablet, 10 mg., Disp: , Rfl:   •  hydroCHLOROthiazide (HYDRODIURIL) 12.5 MG tablet, 12.5 mg., Disp: , Rfl:   •  HYDROcodone-acetaminophen (NORCO)  MG per tablet, Take 1 tablet by mouth Every 4 (Four) Hours As Needed for Moderate Pain, Disp: 12 tablet, Rfl: 0  •  HYDROcodone-acetaminophen (Norco) 7.5-325 MG per tablet, Take 1 tablet by mouth 4 (Four) Times a Day As Needed for Moderate Pain ., Disp: 8 tablet, Rfl: 0  •  lidocaine-prilocaine (EMLA) 2.5-2.5 % cream, Apply generous amount to port site 30-45 minutes prior to use and cover with plastic wrap, Disp: 30 g, Rfl: 5  •  metoprolol tartrate (LOPRESSOR) 50 MG tablet, 50 mg., Disp: , Rfl:   •  Morphine (MS CONTIN) 15 MG 12 hr tablet, Take 1 tablet by mouth Every 8 (Eight) Hours., Disp: 90 tablet, Rfl: 0  •  OLANZapine (zyPREXA) 5 MG tablet, Take by mouth on days 2, 3 and 4 after chemotherapy., Disp: 3 tablet, Rfl: 5  •  OLANZapine (zyPREXA) 5 MG tablet, Take 1 tablet by mouth Every Night. Take on days 2, 3 and 4 after chemotherapy., Disp: 3 tablet, Rfl: 5  •  ondansetron (ZOFRAN) 8 MG tablet, Take 1 tablet by mouth Every 8 (Eight) Hours As Needed for Nausea or Vomiting., Disp: 30 tablet, Rfl: 1  •   ondansetron (ZOFRAN) 8 MG tablet, Take 1 tablet by mouth 3 (Three) Times a Day As Needed for Nausea or Vomiting., Disp: 30 tablet, Rfl: 5  •  oxyCODONE (ROXICODONE) 5 MG immediate release tablet, Take 1-2 tabs every 4-6 hours as needed for pain, Disp: 180 tablet, Rfl: 0  •  prochlorperazine (COMPAZINE) 10 MG tablet, Take 1 tablet by mouth Every 6 (Six) Hours As Needed for Nausea or Vomiting., Disp: 60 tablet, Rfl: 1  •  sennosides-docusate (Senna-S) 8.6-50 MG per tablet, Take 2 tablets by mouth 2 (Two) Times a Day., Disp: 120 tablet, Rfl: 5  No current facility-administered medications for this visit.    Facility-Administered Medications Ordered in Other Visits:   •  heparin 100 UNIT/ML injection  - ADS Override Pull, , , ,     Allergies:   Allergies   Allergen Reactions   • Lisinopril Anaphylaxis and Rash   • Penicillins Anaphylaxis and Rash       KPS: 90%     Results Review:   The following data was reviewed by: BETITO Blank on 12/15/2021:  Common labs    Common Labsle 11/10/21 11/10/21 12/1/21 12/1/21 12/8/21 12/8/21    0825 0825 0856 0856 1043 1043   Glucose  123 (A)  108 (A)  115 (A)   BUN  14  17  22   Creatinine  1.50 (A)  1.27  1.10   eGFR Non  Am  46 (A)  56 (A)  66   Sodium  140  144  140   Potassium  3.6  4.0  4.2   Chloride  100  105  105   Calcium  9.5  9.3  8.7   Albumin  4.47  4.23  4.02   Total Bilirubin  0.4  0.4  0.3   Alkaline Phosphatase  87  95  177 (A)   AST (SGOT)  21  17  14   ALT (SGPT)  14  15  12   WBC 6.89  9.27  18.19 (A)    Hemoglobin 11.7 (A)  12.2 (A)  11.1 (A)    Hematocrit 33.5 (A)  36.0 (A)  34.1 (A)    Platelets 228  173  63 (A)    (A) Abnormal value       Comments are available for some flowsheets but are not being displayed.               Imaging:    NM PET- 8/6/2021  IMPRESSION:  1. Hydronephrosis bilaterally and hydroureter bilaterally to the level  of a thickened urinary bladder.  2. Diffuse increased activity throughout the left collecting system  as  well as in the bladder.  3. Heterogeneous soft tissue mass in the pelvis intimate with the  prostate. The periphery does show hypermetabolic activity. Cannot  completely separate this activity from the bladder. Maximum SUV is  9.656. Urologic follow-up is suggested  4. No other areas of abnormal hypermetabolic activity.    CT of the abdomen pelvis without contrast-10/18/2021  IMPRESSION:  Marked hydronephrosis and hydroureter involving the right  kidney, felt to be due to long-standing obstruction. There is diffuse  thinning of the cortical tissue. There is a large mass in the bladder  base on the right side with calcifications extending outside the bladder  into the pelvic soft tissues. The left kidney was unremarkable.    CT of the chest without contrast-10/18/2021  IMPRESSION:  No CT findings to suggest metastatic disease.     CT of the chest without contrast-12/7/2021  FINDINGS:  LUNGS: Unremarkable. No parenchymal soft tissue nodules.  No focal air  space disease.  HEART: Unremarkable.  MEDIASTINUM: No masses. No enlarged lymph nodes.  No fluid collections.  PLEURA: No pleural effusion. No pleural mass or abnormal calcification.  No pneumothorax.  VASCULATURE: No evidence of aneurysm.  BONES: No acute bony abnormality.  VISUALIZED UPPER ABDOMEN:Please see the CT report for the abdomen and  pelvis.  Other: None.     IMPRESSION:  1. No evidence of metastatic disease to the chest.    CT of the abdomen pelvis without contrast-12/7/2021  FINDINGS:  Lower thorax: Clear. No effusions.  Abdomen:  Liver: Low-attenuation lesions in the liver are stable and have an  appearance most suggestive of hepatic cysts.  Gallbladder: No dilation or stone identified.  Pancreas: Unremarkable. No mass or ductal dilatation.  Spleen: Homogeneous. No splenomegaly.  Adrenals: No mass.  Kidneys/ureters: Right-sided hydronephrosis and hydroureter with  right-sided cortical thinning. This is stable from the previous exam.  GI tract:  Non-dilated. No definite wall thickening.. There is no  evidence of appendicitis  MESENTERY: No free fluid, walled off fluid collections, mesenteric  stranding, or enlarged lymph nodes  Vasculature: No evidence of aneurysm.   Abdominal wall: There is a right inguinal hernia which does contain  bowel and fluid.  Bladder: There is a mass in the right posterior aspect of the bladder  with calcifications. The mass does appear to extend into the adjacent  soft tissues and is very similar to the previous exam. This does appear  to be the culprit for the upstream dilatation on the right.  Reproductive: Prostate enlargement.  Bones: No acute bony abnormality.     IMPRESSION:  1. Right-sided hydronephrosis and hydroureter due to a bladder wall  mass.  2.Right inguinal hernia containing bowel. No evidence of obstruction at  this time.  3. Prostate enlargement.  4. Other findings as above.      Pathology:    3/17/2021        6/14/2021            Objective     Physical Exam:  Physical Exam  Vitals reviewed.   Constitutional:       General: He is not in acute distress.     Appearance: Normal appearance. He is normal weight. He is not ill-appearing.   HENT:      Head: Normocephalic.      Nose: Nose normal.      Mouth/Throat:      Mouth: Mucous membranes are moist.      Pharynx: Oropharynx is clear.   Eyes:      Extraocular Movements: Extraocular movements intact.      Conjunctiva/sclera: Conjunctivae normal.      Pupils: Pupils are equal, round, and reactive to light.   Cardiovascular:      Rate and Rhythm: Normal rate and regular rhythm.      Pulses: Normal pulses.      Heart sounds: Normal heart sounds.   Pulmonary:      Effort: Pulmonary effort is normal. No respiratory distress.      Breath sounds: Normal breath sounds.   Abdominal:      General: Abdomen is flat. Bowel sounds are normal. There is no distension.      Palpations: Abdomen is soft. There is no mass.   Musculoskeletal:         General: No swelling or tenderness.  Normal range of motion.      Cervical back: Normal range of motion.   Skin:     General: Skin is warm and dry.      Capillary Refill: Capillary refill takes less than 2 seconds.   Neurological:      General: No focal deficit present.      Mental Status: He is alert and oriented to person, place, and time. Mental status is at baseline.   Psychiatric:         Mood and Affect: Mood normal.         Behavior: Behavior normal.         Thought Content: Thought content normal.         Judgment: Judgment normal.         Vital Signs:   Vitals:    12/15/21 0853   BP: 117/65   Pulse: 75   Resp: 18   Temp: 98.4 °F (36.9 °C)   TempSrc: Temporal   SpO2: 99%   Weight: 73.5 kg (162 lb)   PainSc: 0-No pain     Body mass index is 25.37 kg/m².       Assessment / Plan      Assessment/Plan:   Alycia De La Rosa is a very pleasant 71 y.o. 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 He clinically has [id number] disease with involvement of the R pelvic sidewall.  Surgery was not performed after TURBT, he was treated with Carboplatin/Etopiside. He has completed 6 cycles of carboplatin/etoposide and he tolerated this well.     Most recently in the literature, 2 major treatment strategies have gained support: (1) neoadjuvant chemotherapy followed by cystectomy; and (2) bladder-sparing therapies, such as TURBT, followed by chemotherapy and radiation.The rationale for the former strategy is that neoadjuvant chemotherapy is thought to eliminate occult metastases and to reduce overall tumor bulk within the pelvis before surgery. Cystectomy is thought to provide optimal local control potentially leading to an improvement in patient survival. The rationale for bladder-sparing approaches is that chemotherapy and radiation when combined with TURBT will provide effective local control and enable preservation of bladder function.  Less-invasive local therapy may be preferable because it leads to less morbidity than cystectomy and because disease recurrences and failures typically occur distantly. The use of induction chemotherapy again helps to prevent metastatic progression.Thus, patient needs to decide about which local therapy to take.    The rate of bladder preservation of small cell carcinoma of the bladder with TURBT, chemotherapy, and radiation is in 70-80% range, primary chemoradiation provides effective local-regional control with a functioning bladder even in the setting of locally advanced disease.    Disease-specific survival (DSS) at 5 years for patients treated initially with cystectomy was only 20%, whereas patients who were treated with neoadjuvant chemotherapy before cystectomy had a 5-year DSS rate of 79%.    We will use IMRT/IGRT techinique to treat regional pelvic nodes, local disease and the bladder primary. The radiation dose for the initial pelvic field will be 46Gy/2Gy per fx..Then we will boost to the primary tumor in the bladder/ pelvic side wall to 60-66Gy/2Gy per fx.    Toxicity will include diarrhea, frequency and burning in acute phase, there is risk for severe frequency and dysuria and necrosis/contracted bladder. Patient will see surgeon and decide after that consult.    IMilagros MD, personally performed the services described in this documentation as scribed by the above named individual in my presence, and it is both accurate and complete.  12/15/2021  10:33 EST     Electronically signed by Milagros Harry MD, 12/15/21, 10:33 AM EST.    Follow Up:   None at this time. He will talk with his surgeon before making his final decision, and then contact our office if he wants to proceed with XRT.       Scribed for Dr. Milagros Harry MD by BETITO Aldana 12/15/2021  09:23 EST

## 2022-01-12 ENCOUNTER — APPOINTMENT (OUTPATIENT)
Dept: ONCOLOGY | Facility: HOSPITAL | Age: 72
End: 2022-01-12

## 2022-01-17 ENCOUNTER — DOCUMENTATION (OUTPATIENT)
Dept: ONCOLOGY | Facility: CLINIC | Age: 72
End: 2022-01-17

## 2022-01-17 NOTE — PROGRESS NOTES
SS received call via [facility] stating that they had received funds and trying to verify if patient continuing with treatment and still eligible for transportation assistance.   applied for assistance August 19, 2021 for patient.  Patient has been on waiting list.  Patient to receive travel assistance funds in the mail in the next few weeks per [facility], as funds permit.    SS will follow.

## 2022-02-23 ENCOUNTER — DOCUMENTATION (OUTPATIENT)
Dept: ONCOLOGY | Facility: CLINIC | Age: 72
End: 2022-02-23

## 2022-07-15 ENCOUNTER — TELEPHONE (OUTPATIENT)
Dept: ONCOLOGY | Facility: CLINIC | Age: 72
End: 2022-07-15

## 2022-07-15 NOTE — TELEPHONE ENCOUNTER
----- Message from Nick Vaca Rep sent at 7/15/2022 10:11 AM EDT -----  Hello, patient daughter (Autumn Le [phone]) wants Dr Mathis to know that her dad wants to know, for his visit on 08/15/2022 at 11:30 whether or not surgery is an option? Thanks

## 2022-07-15 NOTE — TELEPHONE ENCOUNTER
After reviewing his chart, I noticed that the patient said previously he didn't want to have surgery but instead wanted to try radiation. It doesn't look like he followed through with radiation after his consultation with Dr. Harry. I spoke with Autumn and informed her that we would need to discuss a plan of care at his next appointment since he has changed his mind. She verbalized understanding and has no further questions/concerns at this time.

## 2022-08-12 DIAGNOSIS — C67.9 SMALL CELL CARCINOMA OF BLADDER: Primary | ICD-10-CM

## 2022-08-12 NOTE — PROGRESS NOTES
NAME: Alycia De La Rosa    : 1950    DATE OF FOLLOW UP:  8/15/2022    DIAGNOSIS:   Small cell bladder cancer, Limited stage disease    TREATMENT HISTORY:  1.         CHIEF COMPLAINT:  Follow up Bladder cancer      HISTORY OF PRESENT ILLNESS:   Alycia De La Rosa is a very pleasant 72 y.o. male who is being seen today at the request of Dr. Xavier Arnold for evaluation and treatment of small cell bladder cancer. He first developed gross hematuria in 2021 and was seen by his PCP Dr. Alec Soto.  He had imaging revealing hydronephrosis due to obstruction of the R ureteral orifice with non-functioning R kidney and was referred to Dr. Link who performed cystoscopy with biopsy on 3-17-21.  Cystoscopy revealed a normal urethra nd prostate with indented R ureteral orfice with obvious tumor behind it.  He performed transurethral resection until obvious ureter and sent tumor for pathologic examination.  This showed high grade urothelial carcinoma .  Muscularis propria was present but uninvolved.  Dr. Link referred him to Dr. Sylvester for consideration of R nephrectomy / ureterectomy.  Mr. De La Rosa was referred to Dr. Sylvester who performed repeat cystoscopy 21.  This revealed a large mass pushing into the bladder mucosa / external compression with mass in the R saad-trigone and R bladder wall.  R ureteral orifice could not be identified.  A 10x8 cm mass was resected.  PAthology showed small cell carcinoma with invasion of the muscularis propria as well as focal conventional high grade urothelial carcinoma.  Dr. Sylvester referred him to Dr. Xavier Arnold given small cell histology.  Dr. Lott recommended imaging for staging and treatment with Carbo/Etoposide/Atezolizumab. He was referred here because he preferred to receive treatment close to home.    Mr. De La Rosa presents today with his daughter (who works with Dr. Otoole).  He is overall doing well.  He has had 2 episodes of gross hematuria in total, but this hasn't been a  "persistent problem.  He denies weight loss.  No chest pain or shortness of breath. He complains of RLQ pelvic pain which radiates into his R hip/ R upper thigh.  He has been using Tylenol for pain which he says is sufficient, but he is clearly in pain in the office today and is unable to sit in the chair because of pain.         INTERVAL HISTORY:   Mr. De La Rosa is here today for follow up of small cell bladder cancer.  He received 6 cycles of carboplatin and etoposide.  After his neoadjuvant therapy, we discussed return for surgical resection versus radiation.  He said that he would prefer to have radiation he thought, but plan to return to Dr. Sylvester as well as to see Dr. Harry.  He saw Dr. Harry who recommended radiation, and it seemed he was set to proceed, but then he did not follow-up.  He canceled his follow-up with me also.  He did not return to see urology at [facility].  His family apparently tried to get him to return as well, but he was adamant that he did not want to.  Over the last few months, he has started to have increasing discomfort.  He says he has some burning with urination as well as gross hematuria.  He has not had any any obstructive symptoms.  He says he urinates about every 30 minutes.  He complains of discomfort in his hips and legs.  He is otherwise doing well.  He says he is eating and drinking well.  He denies nausea or vomiting.  He denies constipation.  He says that after speaking with Dr. Harry he was under the impression that radiation will be a waste of his time.  He also said \"I am not can a full with no radiation.  That burns you up.\"  He is here today, because he says he thinks he wants to have surgery now he wonders if he requires repeat imaging.      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       PAST SURGICAL HISTORY:  Past Surgical History:   Procedure Laterality Date   • CYSTOSCOPY RETROGRADE PYELOGRAM Right 3/17/2021    Procedure: CYSTOSCOPY RETROGRADE PYELOGRAM;  Surgeon: Dennis Link MD;  Location: [facility];  Service: Urology;  Laterality: Right;   • CYSTOSCOPY URETEROSCOPY Bilateral 3/17/2021    Procedure: CYSTOSCOPY TRANSURETHRAL RESECTION OF BLADDER TUMOR;  Surgeon: Dennis Link MD;  Location: [facility];  Service: Urology;  Laterality: Bilateral;   • HYDROCELE EXCISION / REPAIR      x2   • VENOUS ACCESS DEVICE (PORT) INSERTION Left 2021    Procedure: INSERTION VENOUS ACCESS DEVICE;  Surgeon: Katy Cool MD;  Location: [facility];  Service: General;  Laterality: Left;       FAMILY HISTORY:  Family History   Problem Relation Age of Onset   • Cancer Father 84        prostate   • Cancer Mother 73        mouth   • Cancer Maternal Aunt         mouth   • Cancer Maternal Grandfather          SOCIAL HISTORY:  Social History     Socioeconomic History   • Marital status:    Tobacco Use   • Smoking status: Former Smoker     Packs/day: 3.00     Years: 5.00     Pack years: 15.00     Types: Cigarettes     Quit date:      Years since quittin.6   • Smokeless tobacco: Former User   Vaping Use   • Vaping Use: Never used   Substance and Sexual Activity   • Alcohol use: Not Currently   • Drug use: Never   • Sexual activity: Defer     Social History     Social History Narrative    He used to work as a  and denies any unusual chemical exposures.        REVIEW OF SYSTEMS:   A comprehensive 14 point review of systems was performed.  Significant findings as mentioned above.  All other systems reviewed and are negative.      MEDICATIONS:  The current medication list was reviewed in the EMR    Current Outpatient Medications:   •  amLODIPine (NORVASC) 10 MG tablet, 10  mg., Disp: , Rfl:   •  hydroCHLOROthiazide (HYDRODIURIL) 12.5 MG tablet, 12.5 mg., Disp: , Rfl:   •  lidocaine-prilocaine (EMLA) 2.5-2.5 % cream, Apply generous amount to port site 30-45 minutes prior to use and cover with plastic wrap, Disp: 30 g, Rfl: 5  •  metoprolol tartrate (LOPRESSOR) 50 MG tablet, 50 mg., Disp: , Rfl:   •  OLANZapine (zyPREXA) 5 MG tablet, Take by mouth on days 2, 3 and 4 after chemotherapy., Disp: 3 tablet, Rfl: 5  •  ondansetron (ZOFRAN) 8 MG tablet, Take 1 tablet by mouth Every 8 (Eight) Hours As Needed for Nausea or Vomiting., Disp: 30 tablet, Rfl: 1  •  ondansetron (ZOFRAN) 8 MG tablet, Take 1 tablet by mouth 3 (Three) Times a Day As Needed for Nausea or Vomiting., Disp: 30 tablet, Rfl: 5  •  oxyCODONE (ROXICODONE) 5 MG immediate release tablet, Take 1-2 tabs every 6 hours as needed for pain, Disp: 120 tablet, Rfl: 0  •  prochlorperazine (COMPAZINE) 10 MG tablet, Take 1 tablet by mouth Every 6 (Six) Hours As Needed for Nausea or Vomiting., Disp: 60 tablet, Rfl: 1  •  sennosides-docusate (Senna-S) 8.6-50 MG per tablet, Take 2 tablets by mouth 2 (Two) Times a Day., Disp: 120 tablet, Rfl: 5  No current facility-administered medications for this visit.    Facility-Administered Medications Ordered in Other Visits:   •  heparin 100 UNIT/ML injection  - ADS Override Pull, , , ,     ALLERGIES:    Allergies   Allergen Reactions   • Lisinopril Anaphylaxis and Rash   • Penicillins Anaphylaxis and Rash       PHYSICAL EXAM:  Vitals:    08/15/22 1123   BP: 128/73   Pulse: 69   Resp: 18   Temp: 97.8 °F (36.6 °C)   SpO2: 98%     Pain Score    08/15/22 1123   PainSc:   2   PainLoc: Comment: Bladder   ECOG score: 0     General:  Awake, alert and oriented, appears well.  In no acute distress  HEENT:  Pupils are equal, round and reactive to light and accommodation, Extra-ocular movements full, Oropharyx clear, mucous membranes moist  Neck:  No JVD, thyromegaly or lymphadenopathy  CV:  Regular rate and  rhythm, no murmurs, rubs or gallops  Resp:  Lungs are clear to auscultation bilaterally, no wheezing  Abd:  Soft, non-tender, non-distended, bowel sounds present, no organomegaly or masses  Ext:  No clubbing, cyanosis or edema  Back: No point tenderness or palpable bony abnormalities.  Lymph:  No cervical, supraclavicular, axillary adenopathy  Neuro: Grossly non-focal exam      PATHOLOGY:  03-17-21 04-20-21 06-14-21          ENDOSCOPY:  Cystoscopy 03-17-21 ([facility]):  Findings: Probable right distal ureteral tumor with nonfunctioning kidney and massive dilation      Cystourethroscopy, TURP 06-14-21 ([facility])  Findings:   1.Left ureteral orifice identified. Retrograde pyelography without hydronephrosis or evidence of filling defect.   2. Large mass pushing into bladder mucosa/external compression- no obvious urothelial papillar lesion. Mass located in the right saad-trigone and right bladder wall. Inability to identify right ureteral orifice  3. Resection of mass with resection size of 10cm x 8 cm.   4. Hemostasis achieved at conclusion of case  5. Placement of 20 F 2 way catheter         IMAGING:  CT Chest 04-12-21  FINDINGS:    LUNGS:  Unremarkable.  No mass.  No consolidation.    PLEURAL SPACE:  Unremarkable.  No pneumothorax.  No significant  effusion.    HEART:  Unremarkable.  No cardiomegaly.  No significant pericardial  effusion.    MEDIASTINUM:  Moderate-sized hiatal hernia.    BONES/JOINTS:  Unremarkable.  No acute fracture.  No dislocation.    SOFT TISSUES:  Unremarkable.    VASCULATURE:  Unremarkable.  No thoracic aortic aneurysm.    LYMPH NODES:  Unremarkable.  No enlarged lymph nodes.    LIVER:  Left lobe of liver cyst measuring 3 cm.    KIDNEYS AND URETERS:  Incidentally imaged right upper kidney shows  moderate hydronephrosis and cortical thinning.     IMPRESSION:    Moderate-sized hiatal hernia.        PET/CT 08-06-21  FINDINGS:      HEAD/NECK:  No FDG hypermetabolic neck adenopathy.  No  FDG hypermetabolic masses.     CHEST:   No FDG hypermetabolic thoracic adenopathy.  No FDG hypermetabolic lung nodules or masses.  Evaluation for tiny parenchymal nodules is somewhat limited on low dose  CT secondary to respiratory motion.     ABDOMEN/PELVIS:   The right kidney shows cortical thinning and the right collecting system  is diffusely dilated to the level of the urinary bladder where the wall  is thickened. The left kidney shows diffuse left-sided dilatation as  well.  The pelvis shows heterogeneous soft tissue mass which appears to be  intimate with the prostate. Significantly larger today than on the  previous CT scan.  Large right-sided hydrocele is present.  There is hypermetabolic activity throughout the dilated left ureter as  well as in the left renal pelvis. This likely represents excreted  radiotracer.  Extensive increased activity intermixed in the heterogeneous appearance  of the prostate.     BONES: There are scattered foci of FDG hypermetabolism most suggestive  of degenerative change seen throughout the axial skeleton. If concern  persist for metastatic lesions of the bone, HDP Bone scan is recommended  for further evaluation.     IMPRESSION:     1. Hydronephrosis bilaterally and hydroureter bilaterally to the level  of a thickened urinary bladder.  2. Diffuse increased activity throughout the left collecting system as  well as in the bladder.  3. Heterogeneous soft tissue mass in the pelvis intimate with the  prostate. The periphery does show hypermetabolic activity. Cannot  completely separate this activity from the bladder. Maximum SUV is  9.656. Urologic follow-up is suggested  4. No other areas of abnormal hypermetabolic activity.      CTCAP 10-18-21  CT FINDINGS: The lungs are generally hyperinflated. No pulmonary  parenchymal lung nodules or masses are identified. There were no pleural  effusions or inflammatory infiltrates. No masses or enlarged lymph nodes  are noted in the  mediastinum or hilar areas.     IMPRESSION:  No CT findings to suggest metastatic disease.       FINDINGS: The liver was remarkable for several hepatic cysts that are  stable in comparing with the previous exam. No focal abnormalities were  seen in the spleen. The pancreas was unremarkable. The kidneys show  marked hydronephrosis involving the collecting system of the right  kidney with marked thinning of the cortical tissue. The right ureter is  also dilated. There is a mass in the area of the trigone of the bladder  that extends into the soft tissues of the retroperitoneum on the right  side. This is concerning for a large bladder tumor. The collecting  system of the left kidney was unremarkable. The aorta is normal in  caliber. There were no enlarged lymph nodes in the retroperitoneum or  mesentery. The bowel shows no evidence of obstruction. There were no  ventral hernias.     IMPRESSION:  Marked hydronephrosis and hydroureter involving the right  kidney, felt to be due to long-standing obstruction. There is diffuse  thinning of the cortical tissue. There is a large mass in the bladder  base on the right side with calcifications extending outside the bladder  into the pelvic soft tissues. The left kidney was unremarkable.      CTCAP without contrast 12-07-21  FINDINGS:     LUNGS: Unremarkable. No parenchymal soft tissue nodules.  No focal air  space disease.     HEART: Unremarkable.     MEDIASTINUM: No masses. No enlarged lymph nodes.  No fluid collections.     PLEURA: No pleural effusion. No pleural mass or abnormal calcification.  No pneumothorax.     VASCULATURE: No evidence of aneurysm.     BONES: No acute bony abnormality.     VISUALIZED UPPER ABDOMEN:Please see the CT report for the abdomen and  pelvis.     Other: None.     IMPRESSION:     1. No evidence of metastatic disease to the chest.  2. Other findings as above.    FINDINGS:     Lower thorax: Clear. No effusions.     Abdomen:     Liver:  Low-attenuation lesions in the liver are stable and have an  appearance most suggestive of hepatic cysts.     Gallbladder: No dilation or stone identified.     Pancreas: Unremarkable. No mass or ductal dilatation.     Spleen: Homogeneous. No splenomegaly.     Adrenals: No mass.     Kidneys/ureters: Right-sided hydronephrosis and hydroureter with  right-sided cortical thinning. This is stable from the previous exam.     GI tract: Non-dilated. No definite wall thickening.. There is no  evidence of appendicitis     MESENTERY: No free fluid, walled off fluid collections, mesenteric  stranding, or enlarged lymph nodes         Vasculature: No evidence of aneurysm.     Abdominal wall: There is a right inguinal hernia which does contain  bowel and fluid.     Bladder: There is a mass in the right posterior aspect of the bladder  with calcifications. The mass does appear to extend into the adjacent  soft tissues and is very similar to the previous exam. This does appear  to be the culprit for the upstream dilatation on the right.     Reproductive: Prostate enlargement.     Bones: No acute bony abnormality.     IMPRESSION:     1. Right-sided hydronephrosis and hydroureter due to a bladder wall  mass.     2.Right inguinal hernia containing bowel. No evidence of obstruction at  this time.     3. Prostate enlargement.     4. Other findings as above.      RECENT LABS:  Lab Results   Component Value Date    WBC 8.32 08/15/2022    HGB 14.3 08/15/2022    HCT 42.3 08/15/2022    MCV 88.5 08/15/2022    RDW 12.3 08/15/2022     08/15/2022    NEUTRORELPCT 76.2 (H) 08/15/2022    LYMPHORELPCT 13.7 (L) 08/15/2022    MONORELPCT 7.5 08/15/2022    EOSRELPCT 1.9 08/15/2022    BASORELPCT 0.5 08/15/2022    NEUTROABS 6.34 08/15/2022    LYMPHSABS 1.14 08/15/2022       Lab Results   Component Value Date     08/15/2022    K 4.1 08/15/2022    CO2 26.3 08/15/2022     08/15/2022    BUN 19 08/15/2022    CREATININE 1.56 (H) 08/15/2022     EGFRIFNONA 66 12/08/2021    GLUCOSE 155 (H) 08/15/2022    CALCIUM 9.8 08/15/2022    ALKPHOS 85 08/15/2022    AST 12 08/15/2022    ALT 9 08/15/2022    BILITOT 0.5 08/15/2022    ALBUMIN 4.24 08/15/2022    PROTEINTOT 7.5 08/15/2022     Lab Results   Component Value Date    FERRITIN 823.40 (H) 07/19/2021    IRON 79 07/19/2021    TIBC 305 07/19/2021    LABIRON 26 07/19/2021    HRIOMWKB86 437 07/19/2021    FOLATE 9.52 07/19/2021       Lab Results   Component Value Date/Time     (H) 08/26/2021 09:23 AM    URICACID 5.3 08/26/2021 09:23 AM         ASSESSMENT & PLAN:  Alycia De La Rosa is a very pleasant 72 y.o. 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    1.  Small Cell Carcinoma of the Bladder:  -Initially diagnosed with localized disease.   -  Clinically had [id number] disease with involvement of the R pelvic sidewall.    -  Given limited stage disease, recommended treatment with Carboplatin/Etoposide followed by either cystectomy or radiation.  Used Carboplatin over Cisplatin given solitary functional kidney.  - He received 6 cycles of carboplatin/etoposide. Overall, he tolerated the treatment exceptionally well without any significant side effects.   - After 3 cycles carboplatin/etoposide imaging showed a significant response to treatment.  I reviewed images personally and with Dr. Mejia. He previously had fabi hydronephrosis and after 3 cycles of treatment, the L ureter/kidney were free / unobstructed.  The mass was about 1/3rd the size of its pre-treatment value. That said, there was still extension of the mass beyond the bladder.  After completion of 6 cycles of therapy he unfortunately continued to have extention to the soft tissues of the pelvic side wall.  Given this, I suspected he would be a better candidate for radiation than for surgery.  I recommended he see Dr. Harry and Dr. Sylvester to  discuss further, but he said he thought he would prefer radiation to surgery and said he would like to see Dr. Harry first and then go from there.  He met with Dr. Harry and plan was for radiation therapy.  He unfortunately did not return for therapy and also canceled all follow-up with me.  He never did return to [facility] to see urology.  - Approximately 8 months have not passed, and he now has dysuria and hematuria as well as some back pain.  He says he is not interested in radiation and wants to have surgery.  - At this point, status of his disease is uncertain.  We will order CT of the chest abdomen pelvis.  We will obtain without contrast as creatinine has increased.  -We will see him back after repeat CT imaging.    2.  Neoplasm related pain:  - After chemotherapy, he was no longer requiring any pain medication.  He now reports dysuria, hematuria and bilateral hip/back/leg pain.  - He has been taking oxycodone which he had leftover from a prior prescription, 5 mg, typically 2 tabs a day.  - We will refill oxycodone.      3.  Prophlaxis:  He hasn't had Prevnar 13, 2021 influenza or COVID 19 vaccinations.  He says that with PCN allergy (anaphylaxis) he has been very hesitant to get vaccinations.  Explained that none of the vaccines contain PCN but he still declined.    4.  Follow up:   -CT of the chest abdomen pelvis without contrast  - Return to see me following CT imaging with CBC and CMP.      5.  ACO / EMRLY/Other  Quality measures  -  Alycia De La Rosa did not receive 2021 flu vaccine.  -  Alycia De La Rosa reports a pain score of 2.  Given his pain assessment as noted, treatment options were discussed and the following options were decided upon as a follow-up plan to address the patient's pain: prescription for opiod analgesics.  -  Current outpatient and discharge medications have been reconciled for the patient.  Reviewed by: Jade Mathis MD       I spent 30 minutes with Alycia De La Rosa today.  In the office today, more  than 50% of this time was spent face-to-face with him  in counseling / coordination of care, reviewing his medical history and counseling on the current treatment plan.  All questions were answered to his satisfaction      Electronically Signed by: Jade Mathis MD       CC:     MD Xavier Garza MD Barbara Michna, MD Stephen E. Strup, MD

## 2022-08-15 ENCOUNTER — OFFICE VISIT (OUTPATIENT)
Dept: ONCOLOGY | Facility: CLINIC | Age: 72
End: 2022-08-15

## 2022-08-15 ENCOUNTER — LAB (OUTPATIENT)
Dept: ONCOLOGY | Facility: CLINIC | Age: 72
End: 2022-08-15

## 2022-08-15 VITALS
BODY MASS INDEX: 24.11 KG/M2 | DIASTOLIC BLOOD PRESSURE: 73 MMHG | WEIGHT: 153.6 LBS | OXYGEN SATURATION: 98 % | HEART RATE: 69 BPM | RESPIRATION RATE: 18 BRPM | SYSTOLIC BLOOD PRESSURE: 128 MMHG | TEMPERATURE: 97.8 F | HEIGHT: 67 IN

## 2022-08-15 DIAGNOSIS — C66.1 URETERAL CARCINOMA, RIGHT: Primary | ICD-10-CM

## 2022-08-15 DIAGNOSIS — G89.3 NEOPLASM RELATED PAIN: ICD-10-CM

## 2022-08-15 DIAGNOSIS — C67.9 SMALL CELL CARCINOMA OF BLADDER: Primary | ICD-10-CM

## 2022-08-15 DIAGNOSIS — C66.1 URETERAL CARCINOMA, RIGHT: ICD-10-CM

## 2022-08-15 DIAGNOSIS — C67.9 SMALL CELL CARCINOMA OF BLADDER: ICD-10-CM

## 2022-08-15 LAB
ALBUMIN SERPL-MCNC: 4.24 G/DL (ref 3.5–5.2)
ALBUMIN/GLOB SERPL: 1.3 G/DL
ALP SERPL-CCNC: 85 U/L (ref 39–117)
ALT SERPL W P-5'-P-CCNC: 9 U/L (ref 1–41)
ANION GAP SERPL CALCULATED.3IONS-SCNC: 11.7 MMOL/L (ref 5–15)
AST SERPL-CCNC: 12 U/L (ref 1–40)
BASOPHILS # BLD AUTO: 0.04 10*3/MM3 (ref 0–0.2)
BASOPHILS NFR BLD AUTO: 0.5 % (ref 0–1.5)
BILIRUB SERPL-MCNC: 0.5 MG/DL (ref 0–1.2)
BUN SERPL-MCNC: 19 MG/DL (ref 8–23)
BUN/CREAT SERPL: 12.2 (ref 7–25)
CALCIUM SPEC-SCNC: 9.8 MG/DL (ref 8.6–10.5)
CHLORIDE SERPL-SCNC: 101 MMOL/L (ref 98–107)
CO2 SERPL-SCNC: 26.3 MMOL/L (ref 22–29)
CREAT SERPL-MCNC: 1.56 MG/DL (ref 0.76–1.27)
DEPRECATED RDW RBC AUTO: 40.2 FL (ref 37–54)
EGFRCR SERPLBLD CKD-EPI 2021: 46.6 ML/MIN/1.73
EOSINOPHIL # BLD AUTO: 0.16 10*3/MM3 (ref 0–0.4)
EOSINOPHIL NFR BLD AUTO: 1.9 % (ref 0.3–6.2)
ERYTHROCYTE [DISTWIDTH] IN BLOOD BY AUTOMATED COUNT: 12.3 % (ref 12.3–15.4)
GLOBULIN UR ELPH-MCNC: 3.3 GM/DL
GLUCOSE SERPL-MCNC: 155 MG/DL (ref 65–99)
HCT VFR BLD AUTO: 42.3 % (ref 37.5–51)
HGB BLD-MCNC: 14.3 G/DL (ref 13–17.7)
IMM GRANULOCYTES # BLD AUTO: 0.02 10*3/MM3 (ref 0–0.05)
IMM GRANULOCYTES NFR BLD AUTO: 0.2 % (ref 0–0.5)
LYMPHOCYTES # BLD AUTO: 1.14 10*3/MM3 (ref 0.7–3.1)
LYMPHOCYTES NFR BLD AUTO: 13.7 % (ref 19.6–45.3)
MCH RBC QN AUTO: 29.9 PG (ref 26.6–33)
MCHC RBC AUTO-ENTMCNC: 33.8 G/DL (ref 31.5–35.7)
MCV RBC AUTO: 88.5 FL (ref 79–97)
MONOCYTES # BLD AUTO: 0.62 10*3/MM3 (ref 0.1–0.9)
MONOCYTES NFR BLD AUTO: 7.5 % (ref 5–12)
NEUTROPHILS NFR BLD AUTO: 6.34 10*3/MM3 (ref 1.7–7)
NEUTROPHILS NFR BLD AUTO: 76.2 % (ref 42.7–76)
NRBC BLD AUTO-RTO: 0 /100 WBC (ref 0–0.2)
PLATELET # BLD AUTO: 194 10*3/MM3 (ref 140–450)
PMV BLD AUTO: 8.9 FL (ref 6–12)
POTASSIUM SERPL-SCNC: 4.1 MMOL/L (ref 3.5–5.2)
PROT SERPL-MCNC: 7.5 G/DL (ref 6–8.5)
RBC # BLD AUTO: 4.78 10*6/MM3 (ref 4.14–5.8)
SODIUM SERPL-SCNC: 139 MMOL/L (ref 136–145)
WBC NRBC COR # BLD: 8.32 10*3/MM3 (ref 3.4–10.8)

## 2022-08-15 PROCEDURE — 80053 COMPREHEN METABOLIC PANEL: CPT | Performed by: INTERNAL MEDICINE

## 2022-08-15 PROCEDURE — 99214 OFFICE O/P EST MOD 30 MIN: CPT | Performed by: INTERNAL MEDICINE

## 2022-08-15 PROCEDURE — 85025 COMPLETE CBC W/AUTO DIFF WBC: CPT | Performed by: INTERNAL MEDICINE

## 2022-08-15 RX ORDER — OXYCODONE HYDROCHLORIDE 5 MG/1
TABLET ORAL
Qty: 120 TABLET | Refills: 0 | Status: SHIPPED | OUTPATIENT
Start: 2022-08-15

## 2022-08-16 ENCOUNTER — APPOINTMENT (OUTPATIENT)
Dept: CT IMAGING | Facility: HOSPITAL | Age: 72
End: 2022-08-16

## 2022-08-17 ENCOUNTER — HOSPITAL ENCOUNTER (OUTPATIENT)
Dept: CT IMAGING | Facility: HOSPITAL | Age: 72
Discharge: HOME | End: 2022-08-17

## 2022-08-17 DIAGNOSIS — C66.1 URETERAL CARCINOMA, RIGHT: ICD-10-CM

## 2022-08-17 DIAGNOSIS — C67.9 SMALL CELL CARCINOMA OF BLADDER: ICD-10-CM

## 2022-08-17 PROCEDURE — 74176 CT ABD & PELVIS W/O CONTRAST: CPT | Performed by: RADIOLOGY

## 2022-08-17 PROCEDURE — 74176 CT ABD & PELVIS W/O CONTRAST: CPT

## 2022-08-17 PROCEDURE — 71250 CT THORAX DX C-: CPT

## 2022-08-17 PROCEDURE — 71250 CT THORAX DX C-: CPT | Performed by: RADIOLOGY

## 2022-09-08 NOTE — PROGRESS NOTES
NAME: Alycia De La Rosa    : 1950    DATE OF FOLLOW UP:  2022    DIAGNOSIS:   Small cell bladder cancer, Limited stage disease    TREATMENT HISTORY:  1.         CHIEF COMPLAINT:  Follow up Bladder cancer      HISTORY OF PRESENT ILLNESS:   Alycia De La Rosa is a very pleasant 72 y.o. male who is being seen today at the request of Dr. Xavier Arnold for evaluation and treatment of small cell bladder cancer. He first developed gross hematuria in 2021 and was seen by his PCP Dr. Alec Soto.  He had imaging revealing hydronephrosis due to obstruction of the R ureteral orifice with non-functioning R kidney and was referred to Dr. Link who performed cystoscopy with biopsy on 3-17-21.  Cystoscopy revealed a normal urethra nd prostate with indented R ureteral orfice with obvious tumor behind it.  He performed transurethral resection until obvious ureter and sent tumor for pathologic examination.  This showed high grade urothelial carcinoma .  Muscularis propria was present but uninvolved.  Dr. Link referred him to Dr. Sylvester for consideration of R nephrectomy / ureterectomy.  Mr. De La Rosa was referred to Dr. Sylvester who performed repeat cystoscopy 21.  This revealed a large mass pushing into the bladder mucosa / external compression with mass in the R saad-trigone and R bladder wall.  R ureteral orifice could not be identified.  A 10x8 cm mass was resected.  PAthology showed small cell carcinoma with invasion of the muscularis propria as well as focal conventional high grade urothelial carcinoma.  Dr. Sylvester referred him to Dr. Xavier Arnold given small cell histology.  Dr. Lott recommended imaging for staging and treatment with Carbo/Etoposide/Atezolizumab. He was referred here because he preferred to receive treatment close to home.    Mr. De La Rosa presents today with his daughter (who works with Dr. Otoole).  He is overall doing well.  He has had 2 episodes of gross hematuria in total, but this hasn't been a  persistent problem.  He denies weight loss.  No chest pain or shortness of breath. He complains of RLQ pelvic pain which radiates into his R hip/ R upper thigh.  He has been using Tylenol for pain which he says is sufficient, but he is clearly in pain in the office today and is unable to sit in the chair because of pain.         INTERVAL HISTORY:   Mr. De La Rosa is here today with his daughter for follow up of small cell bladder cancer and recent imaging.  He had repeat CT imaging as below, which did show some evidence of progressive disease in the bladder while off of treatment.  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  There was no evidence of metastatic disease.  Unfortunately, he developed sudden pain in his right lower extremity upon awakening on Tuesday, October 30.  He went to the emergency room in [place] and says he was diagnosed with a right lower extremity DVT.  He was started on Eliquis, which she says he is tolerating well.  Pain has improved in the right lower extremity.  He denies any development of chest pain or shortness of breath.  He has continued to have frequent urination as well as gross hematuria.  Happily this is not any worse since he has started his blood thinner.  He has thought about it a lot, and is still not certain if he wants to have any other therapy.  His mother apparently took what sounds like concurrent cisplatin and radiation for head neck cancer approximately 20 years ago, and he remembers this is a very very toxic treatment.  We discussed the difference between that and what his treatment would look like.      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       PAST  SURGICAL HISTORY:  Past Surgical History:   Procedure Laterality Date   • CYSTOSCOPY RETROGRADE PYELOGRAM Right 3/17/2021    Procedure: CYSTOSCOPY RETROGRADE PYELOGRAM;  Surgeon: Dennis Link MD;  Location: [facility];  Service: Urology;  Laterality: Right;   • CYSTOSCOPY URETEROSCOPY Bilateral 3/17/2021    Procedure: CYSTOSCOPY TRANSURETHRAL RESECTION OF BLADDER TUMOR;  Surgeon: Dennis Link MD;  Location: [facility];  Service: Urology;  Laterality: Bilateral;   • HYDROCELE EXCISION / REPAIR      x2   • VENOUS ACCESS DEVICE (PORT) INSERTION Left 2021    Procedure: INSERTION VENOUS ACCESS DEVICE;  Surgeon: Katy Cool MD;  Location: [facility];  Service: General;  Laterality: Left;       FAMILY HISTORY:  Family History   Problem Relation Age of Onset   • Cancer Father 84        prostate   • Cancer Mother 73        mouth   • Cancer Maternal Aunt         mouth   • Cancer Maternal Grandfather          SOCIAL HISTORY:  Social History     Socioeconomic History   • Marital status:    Tobacco Use   • Smoking status: Former Smoker     Packs/day: 3.00     Years: 5.00     Pack years: 15.00     Types: Cigarettes     Quit date:      Years since quittin.7   • Smokeless tobacco: Former User   Vaping Use   • Vaping Use: Never used   Substance and Sexual Activity   • Alcohol use: Not Currently   • Drug use: Never   • Sexual activity: Defer     Social History     Social History Narrative    He used to work as a  and denies any unusual chemical exposures.        REVIEW OF SYSTEMS:   A comprehensive 14 point review of systems was performed.  Significant findings as mentioned above.  All other systems reviewed and are negative.      MEDICATIONS:  The current medication list was reviewed in the EMR    Current Outpatient Medications:   •  amLODIPine (NORVASC) 10 MG tablet, 10 mg., Disp: , Rfl:   •  apixaban (ELIQUIS) 5 MG tablet tablet, Take 5 mg by mouth 2 (Two) Times a Day.,  Disp: , Rfl:   •  hydroCHLOROthiazide (HYDRODIURIL) 12.5 MG tablet, 12.5 mg., Disp: , Rfl:   •  lidocaine-prilocaine (EMLA) 2.5-2.5 % cream, Apply generous amount to port site 30-45 minutes prior to use and cover with plastic wrap, Disp: 30 g, Rfl: 5  •  metoprolol tartrate (LOPRESSOR) 50 MG tablet, 50 mg., Disp: , Rfl:   •  OLANZapine (zyPREXA) 5 MG tablet, Take by mouth on days 2, 3 and 4 after chemotherapy., Disp: 3 tablet, Rfl: 5  •  ondansetron (ZOFRAN) 8 MG tablet, Take 1 tablet by mouth Every 8 (Eight) Hours As Needed for Nausea or Vomiting., Disp: 30 tablet, Rfl: 1  •  ondansetron (ZOFRAN) 8 MG tablet, Take 1 tablet by mouth 3 (Three) Times a Day As Needed for Nausea or Vomiting., Disp: 30 tablet, Rfl: 5  •  oxyCODONE (ROXICODONE) 5 MG immediate release tablet, Take 1-2 tabs every 6 hours as needed for pain, Disp: 120 tablet, Rfl: 0  •  prochlorperazine (COMPAZINE) 10 MG tablet, Take 1 tablet by mouth Every 6 (Six) Hours As Needed for Nausea or Vomiting., Disp: 60 tablet, Rfl: 1  •  sennosides-docusate (Senna-S) 8.6-50 MG per tablet, Take 2 tablets by mouth 2 (Two) Times a Day., Disp: 120 tablet, Rfl: 5  No current facility-administered medications for this visit.    Facility-Administered Medications Ordered in Other Visits:   •  heparin 100 UNIT/ML injection  - ADS Override Pull, , , ,     ALLERGIES:    Allergies   Allergen Reactions   • Lisinopril Anaphylaxis and Rash   • Penicillins Anaphylaxis and Rash       PHYSICAL EXAM:  Vitals:    09/09/22 1337   BP: 127/70   Pulse: 89   Resp: 18   Temp: 98.6 °F (37 °C)   SpO2: 97%     Pain Score    09/09/22 1337   PainSc:   5   PainLoc: Leg   ECOG score: 0     General:  Awake, alert and oriented, appears well.  In no acute distress  HEENT:  Pupils are equal, round and reactive to light and accommodation, Extra-ocular movements full, Oropharyx clear, mucous membranes moist  Neck:  No JVD, thyromegaly or lymphadenopathy  CV:  Regular rate and rhythm, no murmurs, rubs  or gallops  Resp:  Lungs are clear to auscultation bilaterally, no crackles  Abd:  Soft, non-tender, non-distended, bowel sounds present, no organomegaly or masses  Ext:  No clubbing, cyanosis or edema.  He does have palpable cord in the popliteal fossa on the right.    Back: No point tenderness or palpable bony abnormalities.  Lymph:  No cervical, supraclavicular, axillary adenopathy  Neuro: Grossly non-focal exam      PATHOLOGY:  03-17-21 04-20-21 06-14-21          ENDOSCOPY:  Cystoscopy 03-17-21 ([facility]):  Findings: Probable right distal ureteral tumor with nonfunctioning kidney and massive dilation      Cystourethroscopy, TURP 06-14-21 ([facility])  Findings:   1.Left ureteral orifice identified. Retrograde pyelography without hydronephrosis or evidence of filling defect.   2. Large mass pushing into bladder mucosa/external compression- no obvious urothelial papillar lesion. Mass located in the right saad-trigone and right bladder wall. Inability to identify right ureteral orifice  3. Resection of mass with resection size of 10cm x 8 cm.   4. Hemostasis achieved at conclusion of case  5. Placement of 20 F 2 way catheter         IMAGING:  CT Chest 04-12-21  FINDINGS:    LUNGS:  Unremarkable.  No mass.  No consolidation.    PLEURAL SPACE:  Unremarkable.  No pneumothorax.  No significant  effusion.    HEART:  Unremarkable.  No cardiomegaly.  No significant pericardial  effusion.    MEDIASTINUM:  Moderate-sized hiatal hernia.    BONES/JOINTS:  Unremarkable.  No acute fracture.  No dislocation.    SOFT TISSUES:  Unremarkable.    VASCULATURE:  Unremarkable.  No thoracic aortic aneurysm.    LYMPH NODES:  Unremarkable.  No enlarged lymph nodes.    LIVER:  Left lobe of liver cyst measuring 3 cm.    KIDNEYS AND URETERS:  Incidentally imaged right upper kidney shows  moderate hydronephrosis and cortical thinning.     IMPRESSION:    Moderate-sized hiatal hernia.        PET/CT 08-06-21  FINDINGS:      HEAD/NECK:  No  FDG hypermetabolic neck adenopathy.  No FDG hypermetabolic masses.     CHEST:   No FDG hypermetabolic thoracic adenopathy.  No FDG hypermetabolic lung nodules or masses.  Evaluation for tiny parenchymal nodules is somewhat limited on low dose  CT secondary to respiratory motion.     ABDOMEN/PELVIS:   The right kidney shows cortical thinning and the right collecting system  is diffusely dilated to the level of the urinary bladder where the wall  is thickened. The left kidney shows diffuse left-sided dilatation as  well.  The pelvis shows heterogeneous soft tissue mass which appears to be  intimate with the prostate. Significantly larger today than on the  previous CT scan.  Large right-sided hydrocele is present.  There is hypermetabolic activity throughout the dilated left ureter as  well as in the left renal pelvis. This likely represents excreted  radiotracer.  Extensive increased activity intermixed in the heterogeneous appearance  of the prostate.     BONES: There are scattered foci of FDG hypermetabolism most suggestive  of degenerative change seen throughout the axial skeleton. If concern  persist for metastatic lesions of the bone, HDP Bone scan is recommended  for further evaluation.     IMPRESSION:     1. Hydronephrosis bilaterally and hydroureter bilaterally to the level  of a thickened urinary bladder.  2. Diffuse increased activity throughout the left collecting system as  well as in the bladder.  3. Heterogeneous soft tissue mass in the pelvis intimate with the  prostate. The periphery does show hypermetabolic activity. Cannot  completely separate this activity from the bladder. Maximum SUV is  9.656. Urologic follow-up is suggested  4. No other areas of abnormal hypermetabolic activity.      CTCAP 10-18-21  CT FINDINGS: The lungs are generally hyperinflated. No pulmonary  parenchymal lung nodules or masses are identified. There were no pleural  effusions or inflammatory infiltrates. No masses or  enlarged lymph nodes  are noted in the mediastinum or hilar areas.     IMPRESSION:  No CT findings to suggest metastatic disease.       FINDINGS: The liver was remarkable for several hepatic cysts that are  stable in comparing with the previous exam. No focal abnormalities were  seen in the spleen. The pancreas was unremarkable. The kidneys show  marked hydronephrosis involving the collecting system of the right  kidney with marked thinning of the cortical tissue. The right ureter is  also dilated. There is a mass in the area of the trigone of the bladder  that extends into the soft tissues of the retroperitoneum on the right  side. This is concerning for a large bladder tumor. The collecting  system of the left kidney was unremarkable. The aorta is normal in  caliber. There were no enlarged lymph nodes in the retroperitoneum or  mesentery. The bowel shows no evidence of obstruction. There were no  ventral hernias.     IMPRESSION:  Marked hydronephrosis and hydroureter involving the right  kidney, felt to be due to long-standing obstruction. There is diffuse  thinning of the cortical tissue. There is a large mass in the bladder  base on the right side with calcifications extending outside the bladder  into the pelvic soft tissues. The left kidney was unremarkable.      CTCAP without contrast 12-07-21  FINDINGS:     LUNGS: Unremarkable. No parenchymal soft tissue nodules.  No focal air  space disease.     HEART: Unremarkable.     MEDIASTINUM: No masses. No enlarged lymph nodes.  No fluid collections.     PLEURA: No pleural effusion. No pleural mass or abnormal calcification.  No pneumothorax.     VASCULATURE: No evidence of aneurysm.     BONES: No acute bony abnormality.     VISUALIZED UPPER ABDOMEN:Please see the CT report for the abdomen and  pelvis.     Other: None.     IMPRESSION:     1. No evidence of metastatic disease to the chest.  2. Other findings as above.    FINDINGS:     Lower thorax: Clear. No  effusions.     Abdomen:     Liver: Low-attenuation lesions in the liver are stable and have an  appearance most suggestive of hepatic cysts.     Gallbladder: No dilation or stone identified.     Pancreas: Unremarkable. No mass or ductal dilatation.     Spleen: Homogeneous. No splenomegaly.     Adrenals: No mass.     Kidneys/ureters: Right-sided hydronephrosis and hydroureter with  right-sided cortical thinning. This is stable from the previous exam.     GI tract: Non-dilated. No definite wall thickening.. There is no  evidence of appendicitis     MESENTERY: No free fluid, walled off fluid collections, mesenteric  stranding, or enlarged lymph nodes         Vasculature: No evidence of aneurysm.     Abdominal wall: There is a right inguinal hernia which does contain  bowel and fluid.     Bladder: There is a mass in the right posterior aspect of the bladder  with calcifications. The mass does appear to extend into the adjacent  soft tissues and is very similar to the previous exam. This does appear  to be the culprit for the upstream dilatation on the right.     Reproductive: Prostate enlargement.     Bones: No acute bony abnormality.     IMPRESSION:     1. Right-sided hydronephrosis and hydroureter due to a bladder wall  mass.     2.Right inguinal hernia containing bowel. No evidence of obstruction at  this time.     3. Prostate enlargement.     4. Other findings as above.      CT Abdomen Pelvis Without Contrast (08/17/2022 08:58)  FINDINGS:     Lower thorax: Clear. No effusions.     Abdomen:     Liver: Low-attenuation lesions in the liver.     Gallbladder: No dilation or stone identified.     Pancreas: Unremarkable. No mass or ductal dilatation.     Spleen: Homogeneous. No splenomegaly.     Adrenals: No mass.     Kidneys/ureters: Right-sided hydronephrosis and hydroureter due to  bladder wall mass.     GI tract: Moderate to large stool burden. There is no evidence of  appendicitis.     MESENTERY: No free fluid, walled  off fluid collections, mesenteric  stranding, or enlarged lymph nodes.        Vasculature: No evidence of aneurysm.     Abdominal wall: Right inguinal hernia containing bowel. No evidence of  obstruction.     Bladder: Diffuse urinary bladder wall thickening.     Reproductive: Prostate enlargement.     Bones: No acute bony abnormality.     IMPRESSION:  1. Right-sided hydronephrosis and hydroureter which appears to be due to  bladder wall mass. There is diffuse bladder wall thickening and adjacent  right-sided soft tissue mass which has slightly increased since the  previous exam.  2. Right inguinal hernia containing bowel. No obstruction at the time of  the study.  3. Other incidental findings as discussed above.    CT Chest Without Contrast Diagnostic (08/17/2022 08:59)  FINDINGS:     LUNGS: Unremarkable. No parenchymal soft tissue nodules.  No focal air  space disease.     HEART: Unremarkable.     MEDIASTINUM: No masses. No enlarged lymph nodes.  No fluid collections.     PLEURA: No pleural effusion. No pleural mass or abnormal calcification.  No pneumothorax.     VASCULATURE: No evidence of aneurysm.     BONES: No acute bony abnormality.     VISUALIZED UPPER ABDOMEN: Please see the report for the CT of the  abdomen and pelvis.     Other: None.     IMPRESSION:  No evidence of interval development of metastatic disease to the chest.    RECENT LABS:  Lab Results   Component Value Date    WBC 10.31 09/09/2022    HGB 13.9 09/09/2022    HCT 41.3 09/09/2022    MCV 86.2 09/09/2022    RDW 12.3 09/09/2022     09/09/2022    NEUTRORELPCT 76.4 (H) 09/09/2022    LYMPHORELPCT 12.4 (L) 09/09/2022    MONORELPCT 9.0 09/09/2022    EOSRELPCT 1.4 09/09/2022    BASORELPCT 0.5 09/09/2022    NEUTROABS 7.88 (H) 09/09/2022    LYMPHSABS 1.28 09/09/2022       Lab Results   Component Value Date     09/09/2022    K 4.3 09/09/2022    CO2 28.7 09/09/2022    CL 97 (L) 09/09/2022    BUN 15 09/09/2022    CREATININE 1.46 (H) 09/09/2022     EGFRIFNONA 66 12/08/2021    GLUCOSE 133 (H) 09/09/2022    CALCIUM 9.8 09/09/2022    ALKPHOS 95 09/09/2022    AST 15 09/09/2022    ALT 15 09/09/2022    BILITOT 0.5 09/09/2022    ALBUMIN 3.70 09/09/2022    PROTEINTOT 7.5 09/09/2022     Lab Results   Component Value Date    FERRITIN 823.40 (H) 07/19/2021    IRON 79 07/19/2021    TIBC 305 07/19/2021    LABIRON 26 07/19/2021    GIVUCSHO71 437 07/19/2021    FOLATE 9.52 07/19/2021       Lab Results   Component Value Date/Time     (H) 08/26/2021 09:23 AM    URICACID 5.3 08/26/2021 09:23 AM         ASSESSMENT & PLAN:  Alycia De La Rosa is a very pleasant 72 y.o. 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    1.  Small Cell Carcinoma of the Bladder:  -Initially diagnosed with localized disease.   -  Clinically had [id number] disease with involvement of the R pelvic sidewall.    -  Given limited stage disease, recommended treatment with Carboplatin/Etoposide followed by either cystectomy or radiation.  Used Carboplatin over Cisplatin given solitary functional kidney.  - He received 6 cycles of carboplatin/etoposide. Overall, he tolerated the treatment exceptionally well without any significant side effects.   - After 3 cycles carboplatin/etoposide imaging showed a significant response to treatment.  I reviewed images personally and with Dr. Mejia. He previously had fabi hydronephrosis and after 3 cycles of treatment, the L ureter/kidney were free / unobstructed.  The mass was about 1/3rd the size of its pre-treatment value. That said, there was still extension of the mass beyond the bladder.  After completion of 6 cycles of therapy he unfortunately continued to have extention to the soft tissues of the pelvic side wall.  Given this, I suspected he would be a better candidate for radiation than for surgery.  I recommended he see Dr. Harry and Dr. Sylvester to  discuss further, but he said he thought he would prefer radiation to surgery and said he would like to see Dr. Harry first and then go from there.  He met with Dr. Harry and plan was for radiation therapy.  He unfortunately did not return for therapy and also canceled all follow-up with me.  He never did return to [facility] to see urology.  - After approximately 8 months have passed, and he developed dysuria and hematuria as well as some back pain.  At that point he returned to the office.  He has had reprogression of his disease locally with redevelopment of hydronephrosis.  I do not see any obvious evidence of metastatic disease.  - I strongly recommended to him that he consider local therapy at this point.  - Given both involvement of the pelvic sidewall as well as recent DVT requiring blood thinner, I believe he would be a better candidate for radiation than for surgery.  I recommended to him that he speak with both Dr. Harry and Dr. Avila to help him make his decision.  He said he does not want to schedule any visits at this time.  He said he will think on it over the weekend and that either he or his daughter will call us next week with a decision.  - I told him that ultimately if he decides not to take any further therapy for his cancer that I would recommend consideration of enrollment in hospice as I expect his symptoms will continue to worsen off of treatment.      2.  Right lower extremity DVT:  - Reports that he was seen at [facility] on 8/30/2022 with diagnosis of right lower extremity DVT.  He has been taking Eliquis.  He says his pain is improved.  We will obtain records from [facility].    3.  Neoplasm related pain:  - After chemotherapy, he was no longer requiring any pain medication.  He now reports dysuria, hematuria and bilateral hip/back/leg pain.  - He has been taking oxycodone which he had leftover from a prior prescription, 5 mg, typically 2 tabs a day.  I refilled this at his last  visit.    4.  Prophlaxis:  He hasn't had Prevnar 13, 2021 influenza or COVID 19 vaccinations.  He says that with PCN allergy (anaphylaxis) he has been very hesitant to get vaccinations.  Explained that none of the vaccines contain PCN but he still declined.    5.  Follow up:   -Continue Eliquis.  - Continue oxycodone for pain.  - Recommended return to see Dr. Harry and Dr. Avila, but he declined for now.  - Strongly recommended he consider radiotherapy.  - He will think about it and give us a call next week.      6. ACO / MELRY/Other  Quality measures  -  Alycia De La Rosa did not receive 2021 flu vaccine.  This was recommended, but declined  -  Alycia De La Rosa reports a pain score of 5.  Given his pain assessment as noted, treatment options were discussed and the following options were decided upon as a follow-up plan to address the patient's pain: prescription for opiod analgesics.  -  Current outpatient and discharge medications have been reconciled for the patient.  Reviewed by: Jade Mathis MD       I spent 30 minutes with Alycia De La Rosa today.  In the office today, more than 50% of this time was spent face-to-face with him  in counseling / coordination of care, reviewing his medical history and counseling on the current treatment plan.  All questions were answered to his satisfaction      Electronically Signed by: Jade Mathis MD       CC:     MD Xavier Garza MD Barbara Michna, MD Stephen E. Strup, MD

## 2022-09-09 ENCOUNTER — OFFICE VISIT (OUTPATIENT)
Dept: ONCOLOGY | Facility: CLINIC | Age: 72
End: 2022-09-09

## 2022-09-09 ENCOUNTER — LAB (OUTPATIENT)
Dept: ONCOLOGY | Facility: CLINIC | Age: 72
End: 2022-09-09

## 2022-09-09 VITALS
OXYGEN SATURATION: 97 % | RESPIRATION RATE: 18 BRPM | SYSTOLIC BLOOD PRESSURE: 127 MMHG | BODY MASS INDEX: 22.91 KG/M2 | HEIGHT: 67 IN | WEIGHT: 146 LBS | TEMPERATURE: 98.6 F | DIASTOLIC BLOOD PRESSURE: 70 MMHG | HEART RATE: 89 BPM

## 2022-09-09 DIAGNOSIS — C66.1 URETERAL CARCINOMA, RIGHT: ICD-10-CM

## 2022-09-09 DIAGNOSIS — N13.9 OBSTRUCTIVE UROPATHY: ICD-10-CM

## 2022-09-09 DIAGNOSIS — G89.3 NEOPLASM RELATED PAIN: ICD-10-CM

## 2022-09-09 DIAGNOSIS — I82.4Y1 ACUTE DEEP VEIN THROMBOSIS (DVT) OF PROXIMAL VEIN OF RIGHT LOWER EXTREMITY: ICD-10-CM

## 2022-09-09 DIAGNOSIS — C67.9 SMALL CELL CARCINOMA OF BLADDER: ICD-10-CM

## 2022-09-09 DIAGNOSIS — R31.9 HEMATURIA, UNSPECIFIED TYPE: ICD-10-CM

## 2022-09-09 DIAGNOSIS — C66.1 URETERAL CARCINOMA, RIGHT: Primary | ICD-10-CM

## 2022-09-09 LAB
ALBUMIN SERPL-MCNC: 3.7 G/DL (ref 3.5–5.2)
ALBUMIN/GLOB SERPL: 1 G/DL
ALP SERPL-CCNC: 95 U/L (ref 39–117)
ALT SERPL W P-5'-P-CCNC: 15 U/L (ref 1–41)
ANION GAP SERPL CALCULATED.3IONS-SCNC: 10.3 MMOL/L (ref 5–15)
AST SERPL-CCNC: 15 U/L (ref 1–40)
BASOPHILS # BLD AUTO: 0.05 10*3/MM3 (ref 0–0.2)
BASOPHILS NFR BLD AUTO: 0.5 % (ref 0–1.5)
BILIRUB SERPL-MCNC: 0.5 MG/DL (ref 0–1.2)
BUN SERPL-MCNC: 15 MG/DL (ref 8–23)
BUN/CREAT SERPL: 10.3 (ref 7–25)
CALCIUM SPEC-SCNC: 9.8 MG/DL (ref 8.6–10.5)
CHLORIDE SERPL-SCNC: 97 MMOL/L (ref 98–107)
CO2 SERPL-SCNC: 28.7 MMOL/L (ref 22–29)
CREAT SERPL-MCNC: 1.46 MG/DL (ref 0.76–1.27)
DEPRECATED RDW RBC AUTO: 38.4 FL (ref 37–54)
EGFRCR SERPLBLD CKD-EPI 2021: 50.5 ML/MIN/1.73
EOSINOPHIL # BLD AUTO: 0.14 10*3/MM3 (ref 0–0.4)
EOSINOPHIL NFR BLD AUTO: 1.4 % (ref 0.3–6.2)
ERYTHROCYTE [DISTWIDTH] IN BLOOD BY AUTOMATED COUNT: 12.3 % (ref 12.3–15.4)
GLOBULIN UR ELPH-MCNC: 3.8 GM/DL
GLUCOSE SERPL-MCNC: 133 MG/DL (ref 65–99)
HCT VFR BLD AUTO: 41.3 % (ref 37.5–51)
HGB BLD-MCNC: 13.9 G/DL (ref 13–17.7)
IMM GRANULOCYTES # BLD AUTO: 0.03 10*3/MM3 (ref 0–0.05)
IMM GRANULOCYTES NFR BLD AUTO: 0.3 % (ref 0–0.5)
LYMPHOCYTES # BLD AUTO: 1.28 10*3/MM3 (ref 0.7–3.1)
LYMPHOCYTES NFR BLD AUTO: 12.4 % (ref 19.6–45.3)
MCH RBC QN AUTO: 29 PG (ref 26.6–33)
MCHC RBC AUTO-ENTMCNC: 33.7 G/DL (ref 31.5–35.7)
MCV RBC AUTO: 86.2 FL (ref 79–97)
MONOCYTES # BLD AUTO: 0.93 10*3/MM3 (ref 0.1–0.9)
MONOCYTES NFR BLD AUTO: 9 % (ref 5–12)
NEUTROPHILS NFR BLD AUTO: 7.88 10*3/MM3 (ref 1.7–7)
NEUTROPHILS NFR BLD AUTO: 76.4 % (ref 42.7–76)
NRBC BLD AUTO-RTO: 0 /100 WBC (ref 0–0.2)
PLATELET # BLD AUTO: 288 10*3/MM3 (ref 140–450)
PMV BLD AUTO: 8.5 FL (ref 6–12)
POTASSIUM SERPL-SCNC: 4.3 MMOL/L (ref 3.5–5.2)
PROT SERPL-MCNC: 7.5 G/DL (ref 6–8.5)
RBC # BLD AUTO: 4.79 10*6/MM3 (ref 4.14–5.8)
SODIUM SERPL-SCNC: 136 MMOL/L (ref 136–145)
WBC NRBC COR # BLD: 10.31 10*3/MM3 (ref 3.4–10.8)

## 2022-09-09 PROCEDURE — 85025 COMPLETE CBC W/AUTO DIFF WBC: CPT | Performed by: INTERNAL MEDICINE

## 2022-09-09 PROCEDURE — 36415 COLL VENOUS BLD VENIPUNCTURE: CPT | Performed by: INTERNAL MEDICINE

## 2022-09-09 PROCEDURE — 99214 OFFICE O/P EST MOD 30 MIN: CPT | Performed by: INTERNAL MEDICINE

## 2022-09-09 PROCEDURE — 80053 COMPREHEN METABOLIC PANEL: CPT | Performed by: INTERNAL MEDICINE

## 2022-09-19 ENCOUNTER — TELEPHONE (OUTPATIENT)
Dept: ONCOLOGY | Facility: CLINIC | Age: 72
End: 2022-09-19

## 2022-09-19 NOTE — TELEPHONE ENCOUNTER
Caller: ZHANNA HAWKINS    Relationship: Emergency Contact    Best call back number: [phone]      What was the call regarding: PATIENTS DAUGHTER CALLED, STATED PATIENT DOES NOT WANT TO DO RADIATION AND TO LET DR FAIRBANKS KNOW

## (undated) DEVICE — SYR LUERLOK 5CC

## (undated) DEVICE — SYS IRR PUMP SGL ACTN VAC SYR 10CC

## (undated) DEVICE — HOLDER: Brand: DEROYAL

## (undated) DEVICE — DRP C/ARM W/BAND W/CLIPS 41X74IN

## (undated) DEVICE — SUT PROLN 3/0 8832H

## (undated) DEVICE — DRAINBAG,ANTI-REFLUX TOWER,L/F,2000ML,LL: Brand: MEDLINE

## (undated) DEVICE — CATH URETRL FLXITP POLLACK STD 5F 70CM

## (undated) DEVICE — PENCL ES MEGADINE EZ/CLEAN BUTN W/HOLSTR 10FT

## (undated) DEVICE — GLV SURG PREMIERPRO MIC LTX PF SZ7 BRN

## (undated) DEVICE — GW SUP AMPLATZ ULTR/STFF/STR PTFE SS 0.35IN 145CM

## (undated) DEVICE — GLW STD STR 3CM .035X150CM

## (undated) DEVICE — COR CYSTO: Brand: MEDLINE INDUSTRIES, INC.

## (undated) DEVICE — PATIENT RETURN ELECTRODE, SINGLE-USE, CONTACT QUALITY MONITORING, ADULT, WITH 9FT CORD, FOR PATIENTS WEIGING OVER 33LBS. (15KG): Brand: MEGADYNE

## (undated) DEVICE — APPL CHLORAPREP HI/LITE 26ML ORNG

## (undated) DEVICE — SUT VIC 3/0 SH 27IN J416H

## (undated) DEVICE — SUT VIC 4/0 P3 18IN J494G

## (undated) DEVICE — PK BASIC 70

## (undated) DEVICE — GOWN,REINF,POLY,ECL,PP SLV,XXL: Brand: MEDLINE

## (undated) DEVICE — SYR LUERLOK 30CC

## (undated) DEVICE — DRAPE,UTILTY,TAPE,15X26, 4EA/PK: Brand: MEDLINE

## (undated) DEVICE — DRAPE,T,LAPARO,TRANS,STERILE: Brand: MEDLINE

## (undated) DEVICE — DECANT BG O JET

## (undated) DEVICE — HF-RESECTION ELECTRODE PLASMALOOP LOOP, MEDIUM, 24 FR., 12°-30°, ESG TURIS: Brand: OLYMPUS

## (undated) DEVICE — GLV SURG PREMIERPRO MIC LTX PF SZ8 BRN

## (undated) DEVICE — CATHETER,FOLEY,100%SILICONE,20FR,10ML,LF: Brand: MEDLINE